# Patient Record
Sex: FEMALE | Race: BLACK OR AFRICAN AMERICAN | NOT HISPANIC OR LATINO
[De-identification: names, ages, dates, MRNs, and addresses within clinical notes are randomized per-mention and may not be internally consistent; named-entity substitution may affect disease eponyms.]

---

## 2013-12-20 RX ORDER — LEVETIRACETAM 250 MG/1
1 TABLET, FILM COATED ORAL
Qty: 0 | Refills: 0 | DISCHARGE
Start: 2013-12-20

## 2013-12-20 RX ORDER — METOPROLOL TARTRATE 50 MG
0.5 TABLET ORAL
Qty: 0 | Refills: 0 | DISCHARGE
Start: 2013-12-20

## 2017-01-26 ENCOUNTER — APPOINTMENT (OUTPATIENT)
Dept: INTERNAL MEDICINE | Facility: CLINIC | Age: 77
End: 2017-01-26

## 2017-01-26 VITALS — HEART RATE: 74 BPM | SYSTOLIC BLOOD PRESSURE: 114 MMHG | RESPIRATION RATE: 12 BRPM | DIASTOLIC BLOOD PRESSURE: 56 MMHG

## 2017-02-18 ENCOUNTER — RX RENEWAL (OUTPATIENT)
Age: 77
End: 2017-02-18

## 2017-03-06 ENCOUNTER — RX RENEWAL (OUTPATIENT)
Age: 77
End: 2017-03-06

## 2017-04-24 ENCOUNTER — APPOINTMENT (OUTPATIENT)
Dept: INTERNAL MEDICINE | Facility: CLINIC | Age: 77
End: 2017-04-24

## 2017-04-24 ENCOUNTER — LABORATORY RESULT (OUTPATIENT)
Age: 77
End: 2017-04-24

## 2017-04-24 VITALS — BODY MASS INDEX: 32.12 KG/M2 | HEIGHT: 61 IN | WEIGHT: 170.1 LBS

## 2017-04-24 VITALS — SYSTOLIC BLOOD PRESSURE: 110 MMHG | DIASTOLIC BLOOD PRESSURE: 54 MMHG | HEART RATE: 76 BPM | RESPIRATION RATE: 12 BRPM

## 2017-04-24 DIAGNOSIS — K58.0 IRRITABLE BOWEL SYNDROME WITH DIARRHEA: ICD-10-CM

## 2017-04-25 ENCOUNTER — RX RENEWAL (OUTPATIENT)
Age: 77
End: 2017-04-25

## 2017-04-26 LAB
25(OH)D3 SERPL-MCNC: 32.7 NG/ML
ALBUMIN SERPL ELPH-MCNC: 4.3 G/DL
ALP BLD-CCNC: 58 U/L
ALT SERPL-CCNC: 19 U/L
ANION GAP SERPL CALC-SCNC: 17 MMOL/L
AST SERPL-CCNC: 18 U/L
BASOPHILS # BLD AUTO: 0.02 K/UL
BASOPHILS NFR BLD AUTO: 0.2 %
BILIRUB DIRECT SERPL-MCNC: 0.1 MG/DL
BILIRUB INDIRECT SERPL-MCNC: 0.2 MG/DL
BILIRUB SERPL-MCNC: 0.3 MG/DL
BUN SERPL-MCNC: 13 MG/DL
CALCIUM SERPL-MCNC: 10 MG/DL
CHLORIDE SERPL-SCNC: 100 MMOL/L
CHOLEST SERPL-MCNC: 123 MG/DL
CHOLEST/HDLC SERPL: 3.7 RATIO
CO2 SERPL-SCNC: 24 MMOL/L
CREAT SERPL-MCNC: 1.03 MG/DL
EOSINOPHIL # BLD AUTO: 0.08 K/UL
EOSINOPHIL NFR BLD AUTO: 1 %
GLUCOSE SERPL-MCNC: 109 MG/DL
HBA1C MFR BLD HPLC: 5.6 %
HCT VFR BLD CALC: 41.3 %
HDLC SERPL-MCNC: 33 MG/DL
HGB BLD-MCNC: 12.2 G/DL
IMM GRANULOCYTES NFR BLD AUTO: 0.2 %
LDLC SERPL CALC-MCNC: 53 MG/DL
LYMPHOCYTES # BLD AUTO: 1.98 K/UL
LYMPHOCYTES NFR BLD AUTO: 23.9 %
MAN DIFF?: NORMAL
MCHC RBC-ENTMCNC: 26.9 PG
MCHC RBC-ENTMCNC: 29.5 GM/DL
MCV RBC AUTO: 91 FL
MONOCYTES # BLD AUTO: 0.49 K/UL
MONOCYTES NFR BLD AUTO: 5.9 %
NEUTROPHILS # BLD AUTO: 5.69 K/UL
NEUTROPHILS NFR BLD AUTO: 68.8 %
PLATELET # BLD AUTO: 233 K/UL
POTASSIUM SERPL-SCNC: 4.7 MMOL/L
PROT SERPL-MCNC: 7.4 G/DL
RBC # BLD: 4.54 M/UL
RBC # FLD: 15.8 %
SODIUM SERPL-SCNC: 141 MMOL/L
T3RU NFR SERPL: 0.84 INDEX
T4 FREE SERPL-MCNC: 1 NG/DL
TRIGL SERPL-MCNC: 186 MG/DL
TSH SERPL-ACNC: 0.97 UIU/ML
WBC # FLD AUTO: 8.28 K/UL

## 2017-06-26 ENCOUNTER — RX RENEWAL (OUTPATIENT)
Age: 77
End: 2017-06-26

## 2017-07-05 ENCOUNTER — RX RENEWAL (OUTPATIENT)
Age: 77
End: 2017-07-05

## 2017-07-12 ENCOUNTER — RX RENEWAL (OUTPATIENT)
Age: 77
End: 2017-07-12

## 2017-07-25 ENCOUNTER — APPOINTMENT (OUTPATIENT)
Dept: INTERNAL MEDICINE | Facility: CLINIC | Age: 77
End: 2017-07-25

## 2017-07-25 VITALS — DIASTOLIC BLOOD PRESSURE: 56 MMHG | RESPIRATION RATE: 12 BRPM | HEART RATE: 78 BPM | SYSTOLIC BLOOD PRESSURE: 100 MMHG

## 2017-07-25 VITALS — WEIGHT: 175 LBS | BODY MASS INDEX: 33.04 KG/M2 | HEIGHT: 61 IN

## 2017-07-25 VITALS — SYSTOLIC BLOOD PRESSURE: 114 MMHG | DIASTOLIC BLOOD PRESSURE: 56 MMHG

## 2017-07-25 DIAGNOSIS — Z60.2 PROBLEMS RELATED TO LIVING ALONE: ICD-10-CM

## 2017-07-25 SDOH — SOCIAL STABILITY - SOCIAL INSECURITY: PROBLEMS RELATED TO LIVING ALONE: Z60.2

## 2017-07-27 PROBLEM — Z60.2 LIVING ALONE: Status: ACTIVE | Noted: 2017-07-27

## 2017-07-29 ENCOUNTER — RX RENEWAL (OUTPATIENT)
Age: 77
End: 2017-07-29

## 2017-08-21 ENCOUNTER — TRANSCRIPTION ENCOUNTER (OUTPATIENT)
Age: 77
End: 2017-08-21

## 2017-09-14 ENCOUNTER — APPOINTMENT (OUTPATIENT)
Dept: INTERNAL MEDICINE | Facility: CLINIC | Age: 77
End: 2017-09-14
Payer: MEDICARE

## 2017-09-14 VITALS — SYSTOLIC BLOOD PRESSURE: 104 MMHG | RESPIRATION RATE: 12 BRPM | HEART RATE: 75 BPM | DIASTOLIC BLOOD PRESSURE: 64 MMHG

## 2017-09-14 VITALS — BODY MASS INDEX: 32.1 KG/M2 | WEIGHT: 170 LBS | HEIGHT: 61 IN

## 2017-09-14 PROCEDURE — 90662 IIV NO PRSV INCREASED AG IM: CPT

## 2017-09-14 PROCEDURE — G0008: CPT

## 2017-09-14 PROCEDURE — 99215 OFFICE O/P EST HI 40 MIN: CPT | Mod: 25

## 2017-09-25 ENCOUNTER — RX RENEWAL (OUTPATIENT)
Age: 77
End: 2017-09-25

## 2017-09-28 ENCOUNTER — RX RENEWAL (OUTPATIENT)
Age: 77
End: 2017-09-28

## 2017-10-23 ENCOUNTER — APPOINTMENT (OUTPATIENT)
Dept: GERIATRICS | Facility: CLINIC | Age: 77
End: 2017-10-23
Payer: MEDICARE

## 2017-10-23 VITALS
BODY MASS INDEX: 33.04 KG/M2 | DIASTOLIC BLOOD PRESSURE: 72 MMHG | OXYGEN SATURATION: 96 % | WEIGHT: 175 LBS | SYSTOLIC BLOOD PRESSURE: 140 MMHG | RESPIRATION RATE: 12 BRPM | HEIGHT: 61 IN | TEMPERATURE: 98.7 F | HEART RATE: 89 BPM

## 2017-10-23 PROCEDURE — 99205 OFFICE O/P NEW HI 60 MIN: CPT

## 2017-10-23 RX ORDER — LEVETIRACETAM 250 MG/1
250 TABLET, FILM COATED ORAL
Qty: 30 | Refills: 0 | Status: DISCONTINUED | COMMUNITY
Start: 2017-06-07 | End: 2017-10-23

## 2017-10-23 RX ORDER — LEVETIRACETAM 750 MG/1
750 TABLET, FILM COATED ORAL
Qty: 60 | Refills: 0 | Status: DISCONTINUED | COMMUNITY
Start: 2017-01-25 | End: 2017-10-23

## 2017-10-24 ENCOUNTER — APPOINTMENT (OUTPATIENT)
Dept: INTERNAL MEDICINE | Facility: CLINIC | Age: 77
End: 2017-10-24

## 2017-10-27 LAB
25(OH)D3 SERPL-MCNC: 19.9 NG/ML
ANION GAP SERPL CALC-SCNC: 16 MMOL/L
APPEARANCE: CLEAR
BASOPHILS # BLD AUTO: 0.01 K/UL
BASOPHILS NFR BLD AUTO: 0.1 %
BILIRUBIN URINE: NEGATIVE
BLOOD URINE: NEGATIVE
BUN SERPL-MCNC: 16 MG/DL
CALCIUM SERPL-MCNC: 9.9 MG/DL
CHLORIDE SERPL-SCNC: 98 MMOL/L
CHOLEST SERPL-MCNC: 158 MG/DL
CHOLEST/HDLC SERPL: 3.4 RATIO
CO2 SERPL-SCNC: 28 MMOL/L
COLOR: YELLOW
CREAT SERPL-MCNC: 0.86 MG/DL
CREAT SPEC-SCNC: 114 MG/DL
EOSINOPHIL # BLD AUTO: 0.03 K/UL
EOSINOPHIL NFR BLD AUTO: 0.3 %
FOLATE SERPL-MCNC: 8.8 NG/ML
GLUCOSE QUALITATIVE U: NEGATIVE MG/DL
GLUCOSE SERPL-MCNC: 102 MG/DL
HBA1C MFR BLD HPLC: 5.7 %
HCT VFR BLD CALC: 39.5 %
HDLC SERPL-MCNC: 47 MG/DL
HGB BLD-MCNC: 12.3 G/DL
IMM GRANULOCYTES NFR BLD AUTO: 0.4 %
KETONES URINE: NEGATIVE
LDLC SERPL CALC-MCNC: 84 MG/DL
LEUKOCYTE ESTERASE URINE: NEGATIVE
LEVETIRACETAM SERPL-MCNC: 29.8 MCG/ML
LYMPHOCYTES # BLD AUTO: 2.67 K/UL
LYMPHOCYTES NFR BLD AUTO: 28 %
MAN DIFF?: NORMAL
MCHC RBC-ENTMCNC: 27.5 PG
MCHC RBC-ENTMCNC: 31.1 GM/DL
MCV RBC AUTO: 88.4 FL
MICROALBUMIN 24H UR DL<=1MG/L-MCNC: 0.8 MG/DL
MICROALBUMIN/CREAT 24H UR-RTO: 7 MG/G
MONOCYTES # BLD AUTO: 0.41 K/UL
MONOCYTES NFR BLD AUTO: 4.3 %
NEUTROPHILS # BLD AUTO: 6.37 K/UL
NEUTROPHILS NFR BLD AUTO: 66.9 %
NITRITE URINE: NEGATIVE
PH URINE: 6
PLATELET # BLD AUTO: 310 K/UL
POTASSIUM SERPL-SCNC: 4.5 MMOL/L
PROTEIN URINE: NEGATIVE MG/DL
RBC # BLD: 4.47 M/UL
RBC # FLD: 17 %
SODIUM SERPL-SCNC: 142 MMOL/L
SPECIFIC GRAVITY URINE: 1.02
TRIGL SERPL-MCNC: 135 MG/DL
TSH SERPL-ACNC: 0.9 UIU/ML
UROBILINOGEN URINE: NEGATIVE MG/DL
VIT B12 SERPL-MCNC: 323 PG/ML
WBC # FLD AUTO: 9.53 K/UL

## 2017-10-30 ENCOUNTER — INPATIENT (INPATIENT)
Facility: HOSPITAL | Age: 77
LOS: 2 days | Discharge: HOME | End: 2017-11-02
Attending: INTERNAL MEDICINE | Admitting: INTERNAL MEDICINE
Payer: MEDICARE

## 2017-10-30 VITALS
RESPIRATION RATE: 18 BRPM | DIASTOLIC BLOOD PRESSURE: 72 MMHG | OXYGEN SATURATION: 97 % | TEMPERATURE: 99 F | HEART RATE: 81 BPM | SYSTOLIC BLOOD PRESSURE: 122 MMHG

## 2017-10-30 DIAGNOSIS — R07.9 CHEST PAIN, UNSPECIFIED: ICD-10-CM

## 2017-10-30 LAB
ALBUMIN SERPL ELPH-MCNC: 4 G/DL — SIGNIFICANT CHANGE UP (ref 3.3–5)
ALP SERPL-CCNC: 61 U/L — SIGNIFICANT CHANGE UP (ref 40–120)
ALT FLD-CCNC: 22 U/L — SIGNIFICANT CHANGE UP (ref 4–33)
AST SERPL-CCNC: 18 U/L — SIGNIFICANT CHANGE UP (ref 4–32)
BASOPHILS # BLD AUTO: 0.02 K/UL — SIGNIFICANT CHANGE UP (ref 0–0.2)
BASOPHILS NFR BLD AUTO: 0.2 % — SIGNIFICANT CHANGE UP (ref 0–2)
BILIRUB SERPL-MCNC: 0.2 MG/DL — SIGNIFICANT CHANGE UP (ref 0.2–1.2)
BUN SERPL-MCNC: 13 MG/DL — SIGNIFICANT CHANGE UP (ref 7–23)
CALCIUM SERPL-MCNC: 9.1 MG/DL — SIGNIFICANT CHANGE UP (ref 8.4–10.5)
CHLORIDE SERPL-SCNC: 101 MMOL/L — SIGNIFICANT CHANGE UP (ref 98–107)
CK MB BLD-MCNC: 3 — HIGH (ref 0–2.5)
CK MB BLD-MCNC: 5.58 NG/ML — HIGH (ref 1–4.7)
CK SERPL-CCNC: 186 U/L — HIGH (ref 25–170)
CO2 SERPL-SCNC: 31 MMOL/L — SIGNIFICANT CHANGE UP (ref 22–31)
CREAT SERPL-MCNC: 0.89 MG/DL — SIGNIFICANT CHANGE UP (ref 0.5–1.3)
EOSINOPHIL # BLD AUTO: 0.02 K/UL — SIGNIFICANT CHANGE UP (ref 0–0.5)
EOSINOPHIL NFR BLD AUTO: 0.2 % — SIGNIFICANT CHANGE UP (ref 0–6)
GLUCOSE SERPL-MCNC: 86 MG/DL — SIGNIFICANT CHANGE UP (ref 70–99)
HCT VFR BLD CALC: 38.3 % — SIGNIFICANT CHANGE UP (ref 34.5–45)
HGB BLD-MCNC: 11.8 G/DL — SIGNIFICANT CHANGE UP (ref 11.5–15.5)
IMM GRANULOCYTES # BLD AUTO: 0.03 # — SIGNIFICANT CHANGE UP
IMM GRANULOCYTES NFR BLD AUTO: 0.3 % — SIGNIFICANT CHANGE UP (ref 0–1.5)
LYMPHOCYTES # BLD AUTO: 2.74 K/UL — SIGNIFICANT CHANGE UP (ref 1–3.3)
LYMPHOCYTES # BLD AUTO: 30.5 % — SIGNIFICANT CHANGE UP (ref 13–44)
MCHC RBC-ENTMCNC: 27.4 PG — SIGNIFICANT CHANGE UP (ref 27–34)
MCHC RBC-ENTMCNC: 30.8 % — LOW (ref 32–36)
MCV RBC AUTO: 88.9 FL — SIGNIFICANT CHANGE UP (ref 80–100)
MONOCYTES # BLD AUTO: 0.74 K/UL — SIGNIFICANT CHANGE UP (ref 0–0.9)
MONOCYTES NFR BLD AUTO: 8.2 % — SIGNIFICANT CHANGE UP (ref 2–14)
NEUTROPHILS # BLD AUTO: 5.42 K/UL — SIGNIFICANT CHANGE UP (ref 1.8–7.4)
NEUTROPHILS NFR BLD AUTO: 60.6 % — SIGNIFICANT CHANGE UP (ref 43–77)
NRBC # FLD: 0 — SIGNIFICANT CHANGE UP
PLATELET # BLD AUTO: 261 K/UL — SIGNIFICANT CHANGE UP (ref 150–400)
PMV BLD: 10.5 FL — SIGNIFICANT CHANGE UP (ref 7–13)
POTASSIUM SERPL-MCNC: 4.1 MMOL/L — SIGNIFICANT CHANGE UP (ref 3.5–5.3)
POTASSIUM SERPL-SCNC: 4.1 MMOL/L — SIGNIFICANT CHANGE UP (ref 3.5–5.3)
PROT SERPL-MCNC: 7.3 G/DL — SIGNIFICANT CHANGE UP (ref 6–8.3)
RBC # BLD: 4.31 M/UL — SIGNIFICANT CHANGE UP (ref 3.8–5.2)
RBC # FLD: 16.5 % — HIGH (ref 10.3–14.5)
SODIUM SERPL-SCNC: 142 MMOL/L — SIGNIFICANT CHANGE UP (ref 135–145)
TROPONIN T SERPL-MCNC: < 0.06 NG/ML — SIGNIFICANT CHANGE UP (ref 0–0.06)
WBC # BLD: 8.97 K/UL — SIGNIFICANT CHANGE UP (ref 3.8–10.5)
WBC # FLD AUTO: 8.97 K/UL — SIGNIFICANT CHANGE UP (ref 3.8–10.5)

## 2017-10-30 PROCEDURE — 93970 EXTREMITY STUDY: CPT | Mod: 26

## 2017-10-30 PROCEDURE — 71020: CPT | Mod: 26

## 2017-10-30 RX ORDER — DULOXETINE HYDROCHLORIDE 30 MG/1
60 CAPSULE, DELAYED RELEASE ORAL DAILY
Qty: 0 | Refills: 0 | Status: DISCONTINUED | OUTPATIENT
Start: 2017-10-30 | End: 2017-11-02

## 2017-10-30 RX ORDER — LEVETIRACETAM 250 MG/1
750 TABLET, FILM COATED ORAL
Qty: 0 | Refills: 0 | Status: DISCONTINUED | OUTPATIENT
Start: 2017-10-30 | End: 2017-11-02

## 2017-10-30 RX ORDER — METOPROLOL TARTRATE 50 MG
12.5 TABLET ORAL
Qty: 0 | Refills: 0 | Status: DISCONTINUED | OUTPATIENT
Start: 2017-10-30 | End: 2017-11-02

## 2017-10-30 RX ORDER — AMLODIPINE BESYLATE 2.5 MG/1
5 TABLET ORAL DAILY
Qty: 0 | Refills: 0 | Status: DISCONTINUED | OUTPATIENT
Start: 2017-10-30 | End: 2017-11-02

## 2017-10-30 RX ORDER — GABAPENTIN 400 MG/1
200 CAPSULE ORAL
Qty: 0 | Refills: 0 | Status: DISCONTINUED | OUTPATIENT
Start: 2017-10-30 | End: 2017-11-02

## 2017-10-30 RX ORDER — LEVETIRACETAM 250 MG/1
250 TABLET, FILM COATED ORAL AT BEDTIME
Qty: 0 | Refills: 0 | Status: DISCONTINUED | OUTPATIENT
Start: 2017-10-30 | End: 2017-11-02

## 2017-10-30 RX ORDER — LEVOTHYROXINE SODIUM 125 MCG
50 TABLET ORAL DAILY
Qty: 0 | Refills: 0 | Status: DISCONTINUED | OUTPATIENT
Start: 2017-10-30 | End: 2017-11-02

## 2017-10-30 RX ORDER — SIMVASTATIN 20 MG/1
20 TABLET, FILM COATED ORAL AT BEDTIME
Qty: 0 | Refills: 0 | Status: DISCONTINUED | OUTPATIENT
Start: 2017-10-30 | End: 2017-11-02

## 2017-10-30 RX ORDER — SODIUM CHLORIDE 9 MG/ML
3 INJECTION INTRAMUSCULAR; INTRAVENOUS; SUBCUTANEOUS EVERY 8 HOURS
Qty: 0 | Refills: 0 | Status: DISCONTINUED | OUTPATIENT
Start: 2017-10-30 | End: 2017-11-02

## 2017-10-30 NOTE — ED ADULT NURSE REASSESSMENT NOTE - NS ED NURSE REASSESS COMMENT FT1
pt appears in NAD @ this time, denies pain, Tele PA @ bedside for eval, as per PA CE#2 @ 12m, VS as noted, awaiting bed assignment and further orders, will continue to monitor.  (break)

## 2017-10-30 NOTE — ED PROVIDER NOTE - PMH
Arthritis  has pain in hips and back  HLD (Hyperlipidemia)    Hypertension    Non-Insulin Dependent Diabetes Mellitus  diagnosed in 1992  Sciatica  both hips

## 2017-10-30 NOTE — H&P ADULT - PROBLEM SELECTOR PLAN 1
Admit to tele to r/o ACS  check cbc, bmp, a1c, flp, tsh, trend CE  echo ordered   Ischemic eval to be determined by Dr. Connolly in AM  discussed with Dr. Connolly

## 2017-10-30 NOTE — ED ADULT NURSE REASSESSMENT NOTE - NS ED NURSE REASSESS COMMENT FT1
rec'd pt. a&ox3, in NAD, breathes with ease, with g20 saline lock on rt wrist with no ss of infiltration. pt. denies any pain at this time. denies any n/v nor SOB. awaits US and XR results. Attending at bedside to eval pt. will continue to monitor

## 2017-10-30 NOTE — ED PROVIDER NOTE - MEDICAL DECISION MAKING DETAILS
77yoF hx of htn, hld, dm pw chest pain. will admit for repeat labs, ekg, possible stress. angina is worsening.

## 2017-10-30 NOTE — ED PROVIDER NOTE - OBJECTIVE STATEMENT
77yoF pw htn, hld, dm pw chest pain this morning, while sitting, left sided, radiating to left arm. pain is associated with sweating at rest. also with some new bilateral lower extremity edema, right worse than the left, both worsening. patient had similar episode with chest pain, diaphoresis, and bilateraly arm pain a few weeks ago. never saw a cardiologist. denies fevers, chills, nausea, vomiting, abd pain or other issues.

## 2017-10-30 NOTE — ED ADULT NURSE NOTE - OBJECTIVE STATEMENT
Pt recd A+Ox3. C/o CP that started at 8am and lasted unknown amount of time. Pain was non radiating on left side of chest, denies N/V or SOB. C/o abd pain that began shortly after. Pt then had BM and all pain was resolved. Pt had similar pain in August and was told to F/U with cardiologist but never did. Denies any CP at this time. Cardiac monitor in place. Will continue to monitor.

## 2017-10-30 NOTE — ED PROVIDER NOTE - ATTENDING CONTRIBUTION TO CARE
Ondina: 76 yo female with DM and HTN c/o multiple episodes of chest pain with associated diaphoresis x several month. Pt ahd a more concernign chest pain episode today so she came to the ED. No associated SOB, fevers or chills. No abdominal pain, nausea or vomiting. NO new/worsening LE edema or pain. NO recent travel. Exam: cardiac and lung exam unremarkable, +chronic left eye swelling/tumor, abdomen is soft an dnontender, NO LE hal aor calf TTP Plan: cbc, cmp, cardiac enzymes, cxr, eklg admit

## 2017-10-30 NOTE — H&P ADULT - ATTENDING COMMENTS
pt seen and examined agree with plan above     EKG - NSR     A/P     1) Chest pain - atypical, will get 2d echo to access LV, CE and EKG negative, will consider stress test     2) H/O pituitary tumor - neuro consult     3) DVT - sc lovenox

## 2017-10-30 NOTE — H&P ADULT - ASSESSMENT
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

## 2017-10-30 NOTE — H&P ADULT - PMH
Arthritis  has pain in hips and back  Brain tumor  sellar mass.  hx of L eye blidness/proptosis from a ?tumor  HLD (Hyperlipidemia)    Hypertension    Non-Insulin Dependent Diabetes Mellitus  diagnosed in 1992  Sciatica  both hips

## 2017-10-30 NOTE — H&P ADULT - PSH
H/O: Hysterectomy  abdominal with right oophorectomy in 1982  Pituitary Tumor  excision in 1992  S/P Cholecystectomy  open in 1979  S/P Tubal Ligation  in 1978  Traumatic subdural hematoma  s/p evacuation in 2013

## 2017-10-30 NOTE — H&P ADULT - HISTORY OF PRESENT ILLNESS
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 Patient states she has been L sided chest pain which radiates to b/l arm and is associated with diaphoresis since August. She was seen at urgent care in August and was advised to follow up with PMD. Patient went to PMD, but had no further work up and patient went to vacation to [place] for two weeks. Patient was then getting symptoms intermittent for two months. This AM she woke up with severe L sided chest pain which was associated with diaphoresis. Her PMDs office normally checks on her monthly through phone, and when she told them about the chest pain. She was instructed to come to ED further eval. 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 Patient states she has been L sided chest pain which radiates to b/l arm and is associated with diaphoresis since August. She was seen at urgent care in August and was advised to follow up with PMD. Patient went to PMD, but had no further work up and patient went to vacation to [place] for two weeks. Patient was then getting symptoms intermittent for two months. This AM she woke up with severe L sided chest pain which was associated with diaphoresis. Her PMDs office normally checks on her monthly through phone, and when she told them about the chest pain. She was instructed to come to ED further eval.  Denies fever, chills, cough, falls, LOC, SOB, GOMEZ, abdominal pain, nausea, vomiting, calf tenderness, dysuria, constipation, melena, or hematochezia    On admission: chest pain free.

## 2017-10-31 DIAGNOSIS — I10 ESSENTIAL (PRIMARY) HYPERTENSION: ICD-10-CM

## 2017-10-31 DIAGNOSIS — Z29.9 ENCOUNTER FOR PROPHYLACTIC MEASURES, UNSPECIFIED: ICD-10-CM

## 2017-10-31 DIAGNOSIS — E11.9 TYPE 2 DIABETES MELLITUS WITHOUT COMPLICATIONS: ICD-10-CM

## 2017-10-31 DIAGNOSIS — R07.9 CHEST PAIN, UNSPECIFIED: ICD-10-CM

## 2017-10-31 DIAGNOSIS — D49.6 NEOPLASM OF UNSPECIFIED BEHAVIOR OF BRAIN: ICD-10-CM

## 2017-10-31 DIAGNOSIS — S06.5X9A TRAUMATIC SUBDURAL HEMORRHAGE WITH LOSS OF CONSCIOUSNESS OF UNSPECIFIED DURATION, INITIAL ENCOUNTER: Chronic | ICD-10-CM

## 2017-10-31 DIAGNOSIS — E78.5 HYPERLIPIDEMIA, UNSPECIFIED: ICD-10-CM

## 2017-10-31 DIAGNOSIS — R56.9 UNSPECIFIED CONVULSIONS: ICD-10-CM

## 2017-10-31 LAB
BASOPHILS # BLD AUTO: 0.03 K/UL — SIGNIFICANT CHANGE UP (ref 0–0.2)
BASOPHILS NFR BLD AUTO: 0.3 % — SIGNIFICANT CHANGE UP (ref 0–2)
BUN SERPL-MCNC: 14 MG/DL — SIGNIFICANT CHANGE UP (ref 7–23)
CALCIUM SERPL-MCNC: 8.8 MG/DL — SIGNIFICANT CHANGE UP (ref 8.4–10.5)
CHLORIDE SERPL-SCNC: 101 MMOL/L — SIGNIFICANT CHANGE UP (ref 98–107)
CHOLEST SERPL-MCNC: 141 MG/DL — SIGNIFICANT CHANGE UP (ref 120–199)
CK MB BLD-MCNC: 3 — HIGH (ref 0–2.5)
CK MB BLD-MCNC: 3.1 — HIGH (ref 0–2.5)
CK MB BLD-MCNC: 4.78 NG/ML — HIGH (ref 1–4.7)
CK MB BLD-MCNC: 4.81 NG/ML — HIGH (ref 1–4.7)
CK SERPL-CCNC: 154 U/L — SIGNIFICANT CHANGE UP (ref 25–170)
CK SERPL-CCNC: 162 U/L — SIGNIFICANT CHANGE UP (ref 25–170)
CO2 SERPL-SCNC: 30 MMOL/L — SIGNIFICANT CHANGE UP (ref 22–31)
CREAT SERPL-MCNC: 0.93 MG/DL — SIGNIFICANT CHANGE UP (ref 0.5–1.3)
EOSINOPHIL # BLD AUTO: 0.04 K/UL — SIGNIFICANT CHANGE UP (ref 0–0.5)
EOSINOPHIL NFR BLD AUTO: 0.5 % — SIGNIFICANT CHANGE UP (ref 0–6)
GLUCOSE SERPL-MCNC: 103 MG/DL — HIGH (ref 70–99)
HBA1C BLD-MCNC: 5.5 % — SIGNIFICANT CHANGE UP (ref 4–5.6)
HCT VFR BLD CALC: 38.1 % — SIGNIFICANT CHANGE UP (ref 34.5–45)
HDLC SERPL-MCNC: 42 MG/DL — LOW (ref 45–65)
HGB BLD-MCNC: 11.6 G/DL — SIGNIFICANT CHANGE UP (ref 11.5–15.5)
IMM GRANULOCYTES # BLD AUTO: 0.03 # — SIGNIFICANT CHANGE UP
IMM GRANULOCYTES NFR BLD AUTO: 0.3 % — SIGNIFICANT CHANGE UP (ref 0–1.5)
LIPID PNL WITH DIRECT LDL SERPL: 82 MG/DL — SIGNIFICANT CHANGE UP
LYMPHOCYTES # BLD AUTO: 2.64 K/UL — SIGNIFICANT CHANGE UP (ref 1–3.3)
LYMPHOCYTES # BLD AUTO: 29.9 % — SIGNIFICANT CHANGE UP (ref 13–44)
MAGNESIUM SERPL-MCNC: 1.9 MG/DL — SIGNIFICANT CHANGE UP (ref 1.6–2.6)
MCHC RBC-ENTMCNC: 26.8 PG — LOW (ref 27–34)
MCHC RBC-ENTMCNC: 30.4 % — LOW (ref 32–36)
MCV RBC AUTO: 88 FL — SIGNIFICANT CHANGE UP (ref 80–100)
MONOCYTES # BLD AUTO: 0.6 K/UL — SIGNIFICANT CHANGE UP (ref 0–0.9)
MONOCYTES NFR BLD AUTO: 6.8 % — SIGNIFICANT CHANGE UP (ref 2–14)
NEUTROPHILS # BLD AUTO: 5.49 K/UL — SIGNIFICANT CHANGE UP (ref 1.8–7.4)
NEUTROPHILS NFR BLD AUTO: 62.2 % — SIGNIFICANT CHANGE UP (ref 43–77)
NRBC # FLD: 0 — SIGNIFICANT CHANGE UP
PHOSPHATE SERPL-MCNC: 4.3 MG/DL — SIGNIFICANT CHANGE UP (ref 2.5–4.5)
PLATELET # BLD AUTO: 256 K/UL — SIGNIFICANT CHANGE UP (ref 150–400)
PMV BLD: 10.9 FL — SIGNIFICANT CHANGE UP (ref 7–13)
POTASSIUM SERPL-MCNC: 4 MMOL/L — SIGNIFICANT CHANGE UP (ref 3.5–5.3)
POTASSIUM SERPL-SCNC: 4 MMOL/L — SIGNIFICANT CHANGE UP (ref 3.5–5.3)
RBC # BLD: 4.33 M/UL — SIGNIFICANT CHANGE UP (ref 3.8–5.2)
RBC # FLD: 16.4 % — HIGH (ref 10.3–14.5)
SODIUM SERPL-SCNC: 143 MMOL/L — SIGNIFICANT CHANGE UP (ref 135–145)
TRIGL SERPL-MCNC: 139 MG/DL — SIGNIFICANT CHANGE UP (ref 10–149)
TROPONIN T SERPL-MCNC: < 0.06 NG/ML — SIGNIFICANT CHANGE UP (ref 0–0.06)
TROPONIN T SERPL-MCNC: < 0.06 NG/ML — SIGNIFICANT CHANGE UP (ref 0–0.06)
TSH SERPL-MCNC: 1.46 UIU/ML — SIGNIFICANT CHANGE UP (ref 0.27–4.2)
WBC # BLD: 8.83 K/UL — SIGNIFICANT CHANGE UP (ref 3.8–10.5)
WBC # FLD AUTO: 8.83 K/UL — SIGNIFICANT CHANGE UP (ref 3.8–10.5)

## 2017-10-31 RX ORDER — SODIUM CHLORIDE 9 MG/ML
1000 INJECTION, SOLUTION INTRAVENOUS
Qty: 0 | Refills: 0 | Status: DISCONTINUED | OUTPATIENT
Start: 2017-10-31 | End: 2017-11-02

## 2017-10-31 RX ORDER — DEXTROSE 50 % IN WATER 50 %
12.5 SYRINGE (ML) INTRAVENOUS ONCE
Qty: 0 | Refills: 0 | Status: DISCONTINUED | OUTPATIENT
Start: 2017-10-31 | End: 2017-11-02

## 2017-10-31 RX ORDER — INSULIN LISPRO 100/ML
VIAL (ML) SUBCUTANEOUS AT BEDTIME
Qty: 0 | Refills: 0 | Status: DISCONTINUED | OUTPATIENT
Start: 2017-10-31 | End: 2017-11-02

## 2017-10-31 RX ORDER — DEXTROSE 50 % IN WATER 50 %
1 SYRINGE (ML) INTRAVENOUS ONCE
Qty: 0 | Refills: 0 | Status: DISCONTINUED | OUTPATIENT
Start: 2017-10-31 | End: 2017-11-02

## 2017-10-31 RX ORDER — ENOXAPARIN SODIUM 100 MG/ML
40 INJECTION SUBCUTANEOUS DAILY
Qty: 0 | Refills: 0 | Status: DISCONTINUED | OUTPATIENT
Start: 2017-10-31 | End: 2017-11-02

## 2017-10-31 RX ORDER — GLUCAGON INJECTION, SOLUTION 0.5 MG/.1ML
1 INJECTION, SOLUTION SUBCUTANEOUS ONCE
Qty: 0 | Refills: 0 | Status: DISCONTINUED | OUTPATIENT
Start: 2017-10-31 | End: 2017-11-02

## 2017-10-31 RX ORDER — DEXTROSE 50 % IN WATER 50 %
25 SYRINGE (ML) INTRAVENOUS ONCE
Qty: 0 | Refills: 0 | Status: DISCONTINUED | OUTPATIENT
Start: 2017-10-31 | End: 2017-11-02

## 2017-10-31 RX ORDER — INSULIN LISPRO 100/ML
VIAL (ML) SUBCUTANEOUS
Qty: 0 | Refills: 0 | Status: DISCONTINUED | OUTPATIENT
Start: 2017-10-31 | End: 2017-11-02

## 2017-10-31 RX ADMIN — GABAPENTIN 200 MILLIGRAM(S): 400 CAPSULE ORAL at 18:54

## 2017-10-31 RX ADMIN — SODIUM CHLORIDE 3 MILLILITER(S): 9 INJECTION INTRAMUSCULAR; INTRAVENOUS; SUBCUTANEOUS at 13:12

## 2017-10-31 RX ADMIN — Medication 12.5 MILLIGRAM(S): at 18:54

## 2017-10-31 RX ADMIN — SODIUM CHLORIDE 3 MILLILITER(S): 9 INJECTION INTRAMUSCULAR; INTRAVENOUS; SUBCUTANEOUS at 21:55

## 2017-10-31 RX ADMIN — SIMVASTATIN 20 MILLIGRAM(S): 20 TABLET, FILM COATED ORAL at 22:48

## 2017-10-31 RX ADMIN — LEVETIRACETAM 750 MILLIGRAM(S): 250 TABLET, FILM COATED ORAL at 05:59

## 2017-10-31 RX ADMIN — AMLODIPINE BESYLATE 5 MILLIGRAM(S): 2.5 TABLET ORAL at 05:59

## 2017-10-31 RX ADMIN — SODIUM CHLORIDE 3 MILLILITER(S): 9 INJECTION INTRAMUSCULAR; INTRAVENOUS; SUBCUTANEOUS at 06:01

## 2017-10-31 RX ADMIN — GABAPENTIN 200 MILLIGRAM(S): 400 CAPSULE ORAL at 06:00

## 2017-10-31 RX ADMIN — LEVETIRACETAM 250 MILLIGRAM(S): 250 TABLET, FILM COATED ORAL at 22:48

## 2017-10-31 RX ADMIN — ENOXAPARIN SODIUM 40 MILLIGRAM(S): 100 INJECTION SUBCUTANEOUS at 18:55

## 2017-10-31 RX ADMIN — LEVETIRACETAM 750 MILLIGRAM(S): 250 TABLET, FILM COATED ORAL at 19:53

## 2017-10-31 RX ADMIN — Medication 12.5 MILLIGRAM(S): at 05:59

## 2017-10-31 RX ADMIN — DULOXETINE HYDROCHLORIDE 60 MILLIGRAM(S): 30 CAPSULE, DELAYED RELEASE ORAL at 12:52

## 2017-10-31 RX ADMIN — Medication 50 MICROGRAM(S): at 06:00

## 2017-10-31 NOTE — CONSULT NOTE ADULT - SUBJECTIVE AND OBJECTIVE BOX
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 Patient states she has been L sided chest pain which radiates to b/l arm and is associated with diaphoresis since August. She was seen at urgent care in August and was advised to follow up with PMD. Patient went to PMD, but had no further work up and patient went to vacation to [place] for two weeks. Patient was then getting symptoms intermittent for two months. This AM she woke up with severe L sided chest pain which was associated with diaphoresis. Her PMDs office normally checks on her monthly through phone, and when she told them about the chest pain. She was instructed to come to ED further eval.  Denies fever, chills, cough, falls, LOC, SOB, GOMEZ, abdominal pain, nausea, vomiting, calf tenderness, dysuria, constipation, melena, or hematochezia    On admission: chest pain free. (30 Oct 2017 23:08)   Neuro consulted secondary to patient's history of pituitary mass, s/p resection in 1992, left eye blindness.  Hx of ADH s/p evacuation in 2013.  Currently patient is stable at her baseline neurologically.  She is on keppra for seizures, she is followed by neuro-oncology at [place].       Review of Systems:  All review of systems negative, except for those marked:  recent CP, currently CP free.  No SOB, no HA.  chronic left eye blindness.  some ataxia which has been chronic since previous pituitary surgery.    PAST MEDICAL & SURGICAL HISTORY:  Brain tumor: sellar mass.  hx of L eye blidness/proptosis from a ?tumor  Sciatica: both hips  Arthritis: has pain in hips and back  HLD (Hyperlipidemia)  Non-Insulin Dependent Diabetes Mellitus: diagnosed in 1992  Hypertension  Traumatic subdural hematoma: s/p evacuation in 2013  Pituitary Tumor: excision in 1992  S/P Cholecystectomy: open in 1979  H/O: Hysterectomy: abdominal with right oophorectomy in 1982  S/P Tubal Ligation: in 1978    Past Hospitalizations:  MEDICATIONS  (STANDING):  amLODIPine   Tablet 5 milliGRAM(s) Oral daily  dextrose 5%. 1000 milliLiter(s) (50 mL/Hr) IV Continuous <Continuous>  dextrose 50% Injectable 12.5 Gram(s) IV Push once  dextrose 50% Injectable 25 Gram(s) IV Push once  dextrose 50% Injectable 25 Gram(s) IV Push once  DULoxetine 60 milliGRAM(s) Oral daily  enoxaparin Injectable 40 milliGRAM(s) SubCutaneous daily  gabapentin 200 milliGRAM(s) Oral two times a day  insulin lispro (HumaLOG) corrective regimen sliding scale   SubCutaneous three times a day before meals  insulin lispro (HumaLOG) corrective regimen sliding scale   SubCutaneous at bedtime  levETIRAcetam 250 milliGRAM(s) Oral at bedtime  levETIRAcetam 750 milliGRAM(s) Oral two times a day  levothyroxine 50 MICROGram(s) Oral daily  metoprolol     tartrate 12.5 milliGRAM(s) Oral two times a day  simvastatin 20 milliGRAM(s) Oral at bedtime  sodium chloride 0.9% lock flush 3 milliLiter(s) IV Push every 8 hours    MEDICATIONS  (PRN):  dextrose Gel 1 Dose(s) Oral once PRN Blood Glucose LESS THAN 70 milliGRAM(s)/deciliter  glucagon  Injectable 1 milliGRAM(s) IntraMuscular once PRN Glucose LESS THAN 70 milligrams/deciliter    Allergies    aspirin (Nausea; Other)  D.A. Chewable (Other)  erythromycin (Rash)  Naprosyn (Other)  Talwin Compound (Urticaria; Rash)  Talwin Lactate (Other)    Intolerances          FAMILY HISTORY:  No pertinent family history in first degree relatives      Social History  Lives with: family     Vital Signs Last 24 Hrs  T(C): 36.7 (31 Oct 2017 16:24), Max: 36.9 (30 Oct 2017 21:08)  T(F): 98 (31 Oct 2017 16:24), Max: 98.5 (30 Oct 2017 21:08)  HR: 85 (31 Oct 2017 16:24) (70 - 90)  BP: 136/66 (31 Oct 2017 16:24) (120/56 - 138/74)  BP(mean): --  RR: 18 (31 Oct 2017 16:24) (17 - 18)  SpO2: 96% (31 Oct 2017 16:24) (96% - 100%)  Daily     Daily        NEUROLOGIC EXAM  Mental Status:     Oriented to time/place/person; Good eye contact ; follow simple commands ;    speech fluent  CN:  left eye proptosis.  Chronic blindness.  Pupil of the right eye is reactive to light. Right eye extra-ocular movement intact.  face symmetric, tongue midline.      Muscle Strength:	 Full strength 5/5, proximal and distal,  upper and lower extremities except left TA 4/5  Muscle Tone:	Normal tone  Deep Tendon Reflexes:         1+/4  : Biceps, Brachioradialis, Triceps Bilateral;  2+/4 : Pattelar, Ankle bilateral. No clonus.  Plantar Response:	Plantar reflexes flexion bilaterally  Sensation:		Intact to pain, light touch, temperature and vibration throughout.  Coordination/	No dysmetria in finger to nose test bilaterally  Cerebellum	  Tandem Gait/Romberg	wide based, antalgic.  Chronic as per patient.    Lab Results:                        11.6   8.83  )-----------( 256      ( 31 Oct 2017 06:30 )             38.1     10-31    143  |  101  |  14  ----------------------------<  103<H>  4.0   |  30  |  0.93    Ca    8.8      31 Oct 2017 06:30  Phos  4.3     10-31  Mg     1.9     10-31    TPro  7.3  /  Alb  4.0  /  TBili  0.2  /  DBili  x   /  AST  18  /  ALT  22  /  AlkPhos  61  10-30    LIVER FUNCTIONS - ( 30 Oct 2017 18:40 )  Alb: 4.0 g/dL / Pro: 7.3 g/dL / ALK PHOS: 61 u/L / ALT: 22 u/L / AST: 18 u/L / GGT: x

## 2017-10-31 NOTE — CONSULT NOTE ADULT - PROBLEM SELECTOR RECOMMENDATION 9
Known brain tumor with left eye blindness.  Patient is followed by neuro-oncologist at [place].  Recommend f/u with neuro-oncology as outpatient.

## 2017-10-31 NOTE — CONSULT NOTE ADULT - SUBJECTIVE AND OBJECTIVE BOX
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 Patient states she has been L sided chest pain which radiates to b/l arm and is associated with diaphoresis since August. She was seen at urgent care in August and was advised to follow up with PMD. Patient went to PMD, but had no further work up and patient went to vacation to [place] for two weeks. Patient was then getting symptoms intermittent for two months. This AM she woke up with severe L sided chest pain which was associated with diaphoresis. Her PMDs office normally checks on her monthly through phone, and when she told them about the chest pain. She was instructed to come to ED further eval.  Denies fever, chills, cough, falls, LOC, SOB, GOMEZ, abdominal pain, nausea, vomiting, calf tenderness, dysuria, constipation, melena, or hematochezia    On admission: chest pain free.     Review of Systems:  · Negative General Symptoms	no fever; no chills; no sweating	  · Negative Skin Symptoms	no rash; no itching; no dryness	  · Negative Ophthalmologic Symptoms	no diplopia; no photophobia; no lacrimation L; no lacrimation R	  · Negative ENMT Symptoms	no hearing difficulty; no ear pain; no tinnitus	  · Negative Respiratory and Thorax Symptoms	no wheezing; no dyspnea; no cough	  · Negative Cardiovascular Symptoms	no palpitations; no dyspnea on exertion; no orthopnea	  · Cardiovascular Symptoms	chest pain	  · Negative Gastrointestinal Symptoms	no nausea; no vomiting	  · Negative Musculoskeletal Symptoms	no arthritis	  · Negative Neurological Symptoms	no transient paralysis	  · Negative Psychiatric Symptoms	no suicidal ideation	  · Negative Hematology Symptoms	no gum bleeding	      Allergies and Intolerances:        Allergies:  	Talwin Compound: Drug, Urticaria, Rash  	erythromycin: Drug, Rash  	aspirin: Drug, Nausea, Other  	Talwin Lactate: Drug, Other, hallucinates  	D.A. Chewable: Drug, Other, diarrhea  	Naprosyn: Drug, Other, abdominal pain    Home Medications:   * Patient Currently Takes Medications as of 30-Oct-2017 23:01 documented in Structured Notes  · 	Artificial Tears ophthalmic solution: 1 drop(s) to each affected eye 3 times a day x 30 days, Last Dose Taken:    · 	simvastatin 20 mg oral tablet: 1 tab(s) orally once a day (at bedtime), Last Dose Taken:    · 	amLODIPine 5 mg oral tablet: 1 tab(s) orally once a day, Last Dose Taken:    · 	Keppra 750 mg oral tablet: 1 tab(s) orally 2 times a day, Last Dose Taken:    · 	metoprolol tartrate 25 mg oral tablet: 0.5 tab(s) orally 2 times a day, Last Dose Taken:    · 	metformin 500 mg oral tablet: 1 cap(s) orally 3 times a day, Last Dose Taken:    · 	gabapentin 100 mg oral capsule: 2 cap(s) orally 2 times a day, Last Dose Taken:    · 	Keppra 250 mg oral tablet: 1 tab(s) orally once a day (at bedtime), Last Dose Taken:    · 	DULoxetine 60 mg oral delayed release capsule: 1 cap(s) orally once a day, Last Dose Taken:    · 	levothyroxine 50 mcg (0.05 mg) oral capsule: 1 cap(s) orally once a day, Last Dose Taken:    · 	Victoza 18 mg/3 mL subcutaneous solution: Last Dose Taken:      Patient History:   Past Medical History:  Arthritis  has pain in hips and back  Brain tumor  sellar mass.  hx of L eye blidness/proptosis from a ?tumor  HLD (Hyperlipidemia)    Hypertension    Non-Insulin Dependent Diabetes Mellitus  diagnosed in 1992  Sciatica  both hips.    Past Surgical History:  H/O: Hysterectomy  abdominal with right oophorectomy in 1982  Pituitary Tumor  excision in 1992  S/P Cholecystectomy  open in 1979  S/P Tubal Ligation  in 1978  Traumatic subdural hematoma  s/p evacuation in 2013.    Family History:  No pertinent family history in first degree relatives.    Social History:  Social History (marital status, living situation, occupation, tobacco use, alcohol and drug use, and sexual history): No smoking or alcohol use	    Tobacco Screening:  · Core Measure Site	No	    Risk Assessment:   Heart Failure:  Does this patient have a history of or has been diagnosed with heart failure? no.      Physical Exam:  · Constitutional	detailed exam	  · Constitutional Details	well-developed; well-groomed; well-nourished; no distress	  · Eyes	detailed exam	  · Eyes Comments	L eye blind and L eye proptosis noted.	  · ENMT	detailed exam	  · ENMT Details	mouth	  · Mouth	normal mouth and gums; moist	  · Neck	detailed exam	  · Neck Details	supple	  · Back	detailed exam	  · Back Details	normal shape; ROM intact; strength intact	  · Respiratory	detailed exam	  · Respiratory Details	airway patent; breath sounds equal; good air movement	  · Cardiovascular	detailed exam	  · Cardiovascular Details	regular rate and rhythm  no rub	  · Gastrointestinal	detailed exam	  · GI Normal	soft; nontender	  · Extremities	detailed exam	  · Extremities Details	no clubbing; no cyanosis; no pedal edema	  · Vascular	detailed exam	  · Carotid Pulse	right normal; left normal	  · Radial Pulse	right normal; left normal	  · DP Pulse	right normal; left normal	  · PT Pulse	right normal; left normal	  · Neurological	detailed exam	  · Neurological Details	alert and oriented x 3; responds to verbal commands; normal strength	  · Skin	detailed exam	  · Skin Details	color normal	  · Lymph Nodes	detailed exam	  · Musculoskeletal	detailed exam	  · Musculoskeletal Details	ROM intact	      Labs and Results:  Labs, Radiology, Cardiology, and Other Results: EKG: NSR 82 LAD, Flat T in I, AVL, V5, V6	    Assessment and Plan:   Assessment:  · Assessment		  76 y/o F with hx of HTN, HLD, DM, pituitary mass, hx of Subdural hematoma which evacuated in 2013 s/p fall, 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  Plan: Admit to tele to r/o ACS    echo     Problem/Plan - 2:  ·  Problem: HLD (Hyperlipidemia).  Plan: cont statin.     Problem/Plan - 3:  ·  Problem: Diabetes mellitus, type 2.  Plan: hold oral hypoglycemics  ISS  monitor fingersticks.     Problem/Plan - 4:  ·  Problem: Brain tumor.  Plan: cont keppra.     Problem/Plan - 5:  ·  Problem: Hypertension.  Plan: cont metoprolol.     Problem/Plan - 6:  Problem: Need for prophylactic measure. Plan: LE stockings given hx of bleeds.

## 2017-11-01 DIAGNOSIS — Z71.89 OTHER SPECIFIED COUNSELING: ICD-10-CM

## 2017-11-01 DIAGNOSIS — F03.91 UNSPECIFIED DEMENTIA WITH BEHAVIORAL DISTURBANCE: ICD-10-CM

## 2017-11-01 DIAGNOSIS — R19.7 DIARRHEA, UNSPECIFIED: ICD-10-CM

## 2017-11-01 DIAGNOSIS — R26.81 UNSTEADINESS ON FEET: ICD-10-CM

## 2017-11-01 DIAGNOSIS — F41.8 OTHER SPECIFIED ANXIETY DISORDERS: ICD-10-CM

## 2017-11-01 DIAGNOSIS — R07.89 OTHER CHEST PAIN: ICD-10-CM

## 2017-11-01 DIAGNOSIS — K21.9 GASTRO-ESOPHAGEAL REFLUX DISEASE WITHOUT ESOPHAGITIS: ICD-10-CM

## 2017-11-01 DIAGNOSIS — E11.69 TYPE 2 DIABETES MELLITUS WITH OTHER SPECIFIED COMPLICATION: ICD-10-CM

## 2017-11-01 LAB
BASOPHILS # BLD AUTO: 0.02 K/UL — SIGNIFICANT CHANGE UP (ref 0–0.2)
BASOPHILS NFR BLD AUTO: 0.2 % — SIGNIFICANT CHANGE UP (ref 0–2)
BUN SERPL-MCNC: 16 MG/DL — SIGNIFICANT CHANGE UP (ref 7–23)
CALCIUM SERPL-MCNC: 8.6 MG/DL — SIGNIFICANT CHANGE UP (ref 8.4–10.5)
CHLORIDE SERPL-SCNC: 103 MMOL/L — SIGNIFICANT CHANGE UP (ref 98–107)
CO2 SERPL-SCNC: 28 MMOL/L — SIGNIFICANT CHANGE UP (ref 22–31)
CREAT SERPL-MCNC: 0.91 MG/DL — SIGNIFICANT CHANGE UP (ref 0.5–1.3)
EOSINOPHIL # BLD AUTO: 0.02 K/UL — SIGNIFICANT CHANGE UP (ref 0–0.5)
EOSINOPHIL NFR BLD AUTO: 0.2 % — SIGNIFICANT CHANGE UP (ref 0–6)
GLUCOSE SERPL-MCNC: 125 MG/DL — HIGH (ref 70–99)
HCT VFR BLD CALC: 42 % — SIGNIFICANT CHANGE UP (ref 34.5–45)
HGB BLD-MCNC: 12.6 G/DL — SIGNIFICANT CHANGE UP (ref 11.5–15.5)
IMM GRANULOCYTES # BLD AUTO: 0.03 # — SIGNIFICANT CHANGE UP
IMM GRANULOCYTES NFR BLD AUTO: 0.4 % — SIGNIFICANT CHANGE UP (ref 0–1.5)
LYMPHOCYTES # BLD AUTO: 2.64 K/UL — SIGNIFICANT CHANGE UP (ref 1–3.3)
LYMPHOCYTES # BLD AUTO: 32.5 % — SIGNIFICANT CHANGE UP (ref 13–44)
MAGNESIUM SERPL-MCNC: 2.1 MG/DL — SIGNIFICANT CHANGE UP (ref 1.6–2.6)
MCHC RBC-ENTMCNC: 26.9 PG — LOW (ref 27–34)
MCHC RBC-ENTMCNC: 30 % — LOW (ref 32–36)
MCV RBC AUTO: 89.6 FL — SIGNIFICANT CHANGE UP (ref 80–100)
MONOCYTES # BLD AUTO: 0.59 K/UL — SIGNIFICANT CHANGE UP (ref 0–0.9)
MONOCYTES NFR BLD AUTO: 7.3 % — SIGNIFICANT CHANGE UP (ref 2–14)
NEUTROPHILS # BLD AUTO: 4.82 K/UL — SIGNIFICANT CHANGE UP (ref 1.8–7.4)
NEUTROPHILS NFR BLD AUTO: 59.4 % — SIGNIFICANT CHANGE UP (ref 43–77)
NRBC # FLD: 0 — SIGNIFICANT CHANGE UP
PHOSPHATE SERPL-MCNC: 3.6 MG/DL — SIGNIFICANT CHANGE UP (ref 2.5–4.5)
PLATELET # BLD AUTO: 283 K/UL — SIGNIFICANT CHANGE UP (ref 150–400)
PMV BLD: 11.1 FL — SIGNIFICANT CHANGE UP (ref 7–13)
POTASSIUM SERPL-MCNC: 4.3 MMOL/L — SIGNIFICANT CHANGE UP (ref 3.5–5.3)
POTASSIUM SERPL-SCNC: 4.3 MMOL/L — SIGNIFICANT CHANGE UP (ref 3.5–5.3)
RBC # BLD: 4.69 M/UL — SIGNIFICANT CHANGE UP (ref 3.8–5.2)
RBC # FLD: 16.6 % — HIGH (ref 10.3–14.5)
SODIUM SERPL-SCNC: 142 MMOL/L — SIGNIFICANT CHANGE UP (ref 135–145)
WBC # BLD: 8.12 K/UL — SIGNIFICANT CHANGE UP (ref 3.8–10.5)
WBC # FLD AUTO: 8.12 K/UL — SIGNIFICANT CHANGE UP (ref 3.8–10.5)

## 2017-11-01 PROCEDURE — 99222 1ST HOSP IP/OBS MODERATE 55: CPT

## 2017-11-01 PROCEDURE — 93306 TTE W/DOPPLER COMPLETE: CPT | Mod: 26

## 2017-11-01 RX ORDER — SUCRALFATE 1 G
1 TABLET ORAL
Qty: 0 | Refills: 0 | Status: DISCONTINUED | OUTPATIENT
Start: 2017-11-01 | End: 2017-11-02

## 2017-11-01 RX ORDER — LACTOBACILLUS ACIDOPHILUS 100MM CELL
1 CAPSULE ORAL
Qty: 0 | Refills: 0 | Status: DISCONTINUED | OUTPATIENT
Start: 2017-11-01 | End: 2017-11-02

## 2017-11-01 RX ORDER — PANTOPRAZOLE SODIUM 20 MG/1
40 TABLET, DELAYED RELEASE ORAL
Qty: 0 | Refills: 0 | Status: DISCONTINUED | OUTPATIENT
Start: 2017-11-01 | End: 2017-11-02

## 2017-11-01 RX ADMIN — ENOXAPARIN SODIUM 40 MILLIGRAM(S): 100 INJECTION SUBCUTANEOUS at 12:04

## 2017-11-01 RX ADMIN — GABAPENTIN 200 MILLIGRAM(S): 400 CAPSULE ORAL at 18:12

## 2017-11-01 RX ADMIN — PANTOPRAZOLE SODIUM 40 MILLIGRAM(S): 20 TABLET, DELAYED RELEASE ORAL at 18:11

## 2017-11-01 RX ADMIN — SODIUM CHLORIDE 3 MILLILITER(S): 9 INJECTION INTRAMUSCULAR; INTRAVENOUS; SUBCUTANEOUS at 22:15

## 2017-11-01 RX ADMIN — DULOXETINE HYDROCHLORIDE 60 MILLIGRAM(S): 30 CAPSULE, DELAYED RELEASE ORAL at 12:04

## 2017-11-01 RX ADMIN — SODIUM CHLORIDE 3 MILLILITER(S): 9 INJECTION INTRAMUSCULAR; INTRAVENOUS; SUBCUTANEOUS at 06:01

## 2017-11-01 RX ADMIN — GABAPENTIN 200 MILLIGRAM(S): 400 CAPSULE ORAL at 06:02

## 2017-11-01 RX ADMIN — LEVETIRACETAM 750 MILLIGRAM(S): 250 TABLET, FILM COATED ORAL at 06:03

## 2017-11-01 RX ADMIN — AMLODIPINE BESYLATE 5 MILLIGRAM(S): 2.5 TABLET ORAL at 06:02

## 2017-11-01 RX ADMIN — SIMVASTATIN 20 MILLIGRAM(S): 20 TABLET, FILM COATED ORAL at 22:15

## 2017-11-01 RX ADMIN — Medication 1 TABLET(S): at 18:12

## 2017-11-01 RX ADMIN — LEVETIRACETAM 750 MILLIGRAM(S): 250 TABLET, FILM COATED ORAL at 18:12

## 2017-11-01 RX ADMIN — LEVETIRACETAM 250 MILLIGRAM(S): 250 TABLET, FILM COATED ORAL at 22:15

## 2017-11-01 RX ADMIN — Medication 12.5 MILLIGRAM(S): at 18:10

## 2017-11-01 RX ADMIN — SODIUM CHLORIDE 3 MILLILITER(S): 9 INJECTION INTRAMUSCULAR; INTRAVENOUS; SUBCUTANEOUS at 13:03

## 2017-11-01 RX ADMIN — Medication 1: at 18:05

## 2017-11-01 RX ADMIN — Medication 50 MICROGRAM(S): at 06:02

## 2017-11-01 RX ADMIN — Medication 12.5 MILLIGRAM(S): at 06:02

## 2017-11-01 NOTE — CONSULT NOTE ADULT - PROBLEM SELECTOR RECOMMENDATION 3
- Follows at [facility], planned for repeat MRI Nov 7th - Follows at [facility], planned for repeat MRI Nov 7th  - On Neurontin for chronic facial neuropathic pain  - c/w Keppra as PTA

## 2017-11-01 NOTE — CONSULT NOTE ADULT - PROBLEM SELECTOR PROBLEM 6
Type 2 diabetes mellitus with other specified complication, without long-term current use of insulin

## 2017-11-01 NOTE — PROGRESS NOTE ADULT - SUBJECTIVE AND OBJECTIVE BOX
Anjum Connolly MD  Interventional Cardiology  [place] Office : [address] [place] [id number]  Tel:   [place] Office : [address] [facility] [place] [address]  Tel: [phone]  Cell : [phone]    Subjective : Pt lying in bed comfortable, not in distress, denies any chest pain or SOB, complains of chronic diarrhea  	  MEDICATIONS:  amLODIPine   Tablet 5 milliGRAM(s) Oral daily  enoxaparin Injectable 40 milliGRAM(s) SubCutaneous daily  metoprolol     tartrate 12.5 milliGRAM(s) Oral two times a day        DULoxetine 60 milliGRAM(s) Oral daily  gabapentin 200 milliGRAM(s) Oral two times a day  levETIRAcetam 250 milliGRAM(s) Oral at bedtime  levETIRAcetam 750 milliGRAM(s) Oral two times a day      dextrose 50% Injectable 12.5 Gram(s) IV Push once  dextrose 50% Injectable 25 Gram(s) IV Push once  dextrose 50% Injectable 25 Gram(s) IV Push once  dextrose Gel 1 Dose(s) Oral once PRN  glucagon  Injectable 1 milliGRAM(s) IntraMuscular once PRN  insulin lispro (HumaLOG) corrective regimen sliding scale   SubCutaneous three times a day before meals  insulin lispro (HumaLOG) corrective regimen sliding scale   SubCutaneous at bedtime  levothyroxine 50 MICROGram(s) Oral daily  simvastatin 20 milliGRAM(s) Oral at bedtime    dextrose 5%. 1000 milliLiter(s) IV Continuous <Continuous>      PHYSICAL EXAM:  T(C): 36.4 (11-01-17 @ 05:41), Max: 36.7 (10-31-17 @ 16:24)  HR: 70 (11-01-17 @ 05:41) (70 - 85)  BP: 133/81 (11-01-17 @ 05:41) (133/81 - 136/66)  RR: 17 (11-01-17 @ 05:41) (17 - 18)  SpO2: 98% (11-01-17 @ 05:41) (96% - 98%)  Wt(kg): --  I&O's Summary        Appearance: Normal	  HEENT:   Normal oral mucosa, PERRL, EOMI	  Cardiovascular: Normal S1 S2, No JVD, No murmurs, No edema  Respiratory: Lungs clear to auscultation	  Gastrointestinal:  Soft, Non-tender, + BS	  Extremities: Normal range of motion, No clubbing, cyanosis or edema                                    12.6   8.12  )-----------( 283      ( 01 Nov 2017 06:21 )             42.0     11-01    142  |  103  |  16  ----------------------------<  125<H>  4.3   |  28  |  0.91    Ca    8.6      01 Nov 2017 06:21  Phos  3.6     11-01  Mg     2.1     11-01    TPro  7.3  /  Alb  4.0  /  TBili  0.2  /  DBili  x   /  AST  18  /  ALT  22  /  AlkPhos  61  10-30    proBNP:   Lipid Profile:   HgA1c:   TSH:

## 2017-11-01 NOTE — CONSULT NOTE ADULT - PROBLEM SELECTOR RECOMMENDATION 6
- A1c in April 2017 was 5.6  - Insulin regimen while, will likely decrease oral antihyperglycemics on outpatient after discharge - A1c in April 2017 was 5.6  - Insulin coverage inpatient per primary team,   - on Metformin and Victoza PTA, plan to decrease oral antihyperglycemics on outpatient after discharge

## 2017-11-01 NOTE — PROGRESS NOTE ADULT - SUBJECTIVE AND OBJECTIVE BOX
CC:  CP    HPI:  currently no headache, no CP.  Patient with history of pituitary tumor s/p partial resection,  left eye blindness, seizures.     NEUROLOGIC EXAM  Mental Status:     Oriented to time/place/person; Good eye contact ; follow simple commands ;    speech fluent  CN:  left eye proptosis and ptosis.  Chronic blindness.  Pupil of the right eye is reactive to light. Right eye extra-ocular movement intact.  face symmetric, tongue midline.      Muscle Strength:	 Full strength 5/5, proximal and distal,  upper and lower extremities except left TA 4/5  Muscle Tone:	Normal tone  Deep Tendon Reflexes:         1+/4  : Biceps, Brachioradialis, Triceps Bilateral;  2+/4 : Pattelar, Ankle bilateral. No clonus.  Plantar Response:	Plantar reflexes flexion bilaterally  Sensation:		Intact to pain, light touch, temperature and vibration throughout.  Coordination/	No dysmetria in finger to nose test bilaterally  Cerebellum	  Tandem Gait/Romberg	not tested today.    A/P  1.  History fo brain tumor (pituitary mass s/p partial resection 1992).  Chronic left eye blindness.    Neurologically stable.  Patient denies any acute changes of her neurological status.  She has a f/u appoint at [facility] with her neuro-oncologist later this month.   2  Seizure, history of , continue keppra.  No active seizure in hospital.   3.  Continue to f/u with her neuro-oncologist as outpatient.   4. Continue cardiology w/u.    5.  Please call again if any new neurological issues arises.

## 2017-11-01 NOTE — CONSULT NOTE ADULT - SUBJECTIVE AND OBJECTIVE BOX
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     Chest pain intermittent since approx August 2017 - severity has improved since initial episode - unable to describe (pt limited historian) but states that it was an 8/10 2 nights ago but currently only a "light heaviness." CP described as heaviness in L chest, radiating to b/l UE, a/w diaphoresis and dry cough, self limited although unclear after what duration of time. Occasional palpitations. No associated SOB/N/V/F/C.  Does produce some whitish sputum. Patient has not noted an association with food/fluid intake however patient is a limited historian.  No know exacerbating/alleviating factors. Previous ST many years ago - normal per patient. Has never seen a cardiologist. Pain also not a/w exertion however patient has limited exercise capacity d/t gait instability - ambulates w/ rolling walker at baseline.     Pt also reports 2 year h/o intermittent diarrhea/abdominal discomfort a/w mucous (sometimes yellow/green) - watery on occassion, sometimes soft, rarely formed.  Patient does have episodes of fecal incontinence when she cannot make it to the bathroom in time.     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   Substance hx:  denies  Living Situation: Lives in split-level home w/ 15 steps, 24/7 HHA coverage   Occupation: retired RN    FAMILY HISTORY:  Mother - DM, HTN  Father Emphysema    [ ] Reviewed and non contributory    BASELINE ADLs (Prior to Admission):     [ ] Independent  [x ] Partially dependent  [ ] Fully dependent    AMBULATION (Prior to Admission):    [ ] Independant  [ ] Cane   [x ] Walker  [ ] Wheelchair  [ ] Bedbound    ADVANCE DIRECTIVES:   Surrogate decision-maker: Asked daughter to bring in HCP form  MOLST: asked daughter to bring in ACP documents otherwise will attemp to fill out MOLST    Allergies    aspirin (Nausea; Other)  D.A. Chewable (Other)  erythromycin (Rash)  Naprosyn (Other)  Talwin Compound (Urticaria; Rash)  Talwin Lactate (Other)    Intolerances      MEDICATIONS  (STANDING):  amLODIPine   Tablet 5 milliGRAM(s) Oral daily  dextrose 5%. 1000 milliLiter(s) (50 mL/Hr) IV Continuous <Continuous>  dextrose 50% Injectable 12.5 Gram(s) IV Push once  dextrose 50% Injectable 25 Gram(s) IV Push once  dextrose 50% Injectable 25 Gram(s) IV Push once  DULoxetine 60 milliGRAM(s) Oral daily  enoxaparin Injectable 40 milliGRAM(s) SubCutaneous daily  gabapentin 200 milliGRAM(s) Oral two times a day  insulin lispro (HumaLOG) corrective regimen sliding scale   SubCutaneous three times a day before meals  insulin lispro (HumaLOG) corrective regimen sliding scale   SubCutaneous at bedtime  levETIRAcetam 250 milliGRAM(s) Oral at bedtime  levETIRAcetam 750 milliGRAM(s) Oral two times a day  levothyroxine 50 MICROGram(s) Oral daily  metoprolol     tartrate 12.5 milliGRAM(s) Oral two times a day  simvastatin 20 milliGRAM(s) Oral at bedtime  sodium chloride 0.9% lock flush 3 milliLiter(s) IV Push every 8 hours    MEDICATIONS  (PRN):  dextrose Gel 1 Dose(s) Oral once PRN Blood Glucose LESS THAN 70 milliGRAM(s)/deciliter  glucagon  Injectable 1 milliGRAM(s) IntraMuscular once PRN Glucose LESS THAN 70 milligrams/deciliter       REVIEW OF SYSTEMS:   Source: [x ] Patient   [ ] Family  [x ] Team    Pain                     [x ] None  [ ] Mild  [ ] Moderate  [ ] Severe  Dyspnea             [x ] None  [ ] Mild  [ ] Moderate  [ ] Severe  Anxiety               [x ] None  [ ] Mild  [ ] Moderate  [ ] Severe  Fatigue               [x ] None  [ ] Mild  [ ] Moderate  [ ] Severe  Nausea               [x ] None  [ ] Mild  [ ] Moderate  [ ] Severe  Appetite Loss    [x ] None  [ ] Mild  [ ] Moderate  [ ] Severe  Constipation     [x ] None  [ ] Mild  [ ] Moderate  [ ] Severe    [ x] All other review of systems reviewed and negative unless noted above   [ ] Unable to obtain due to poor mentation           Physical Exam:  Vital Signs Last 24 Hrs  T(C): 36.4 (01 Nov 2017 05:41), Max: 36.7 (31 Oct 2017 16:24)  T(F): 97.6 (01 Nov 2017 05:41), Max: 98.1 (31 Oct 2017 21:54)  HR: 70 (01 Nov 2017 05:41) (70 - 85)  BP: 133/81 (01 Nov 2017 05:41) (133/81 - 136/66)  BP(mean): --  RR: 17 (01 Nov 2017 05:41) (17 - 18)  SpO2: 98% (01 Nov 2017 05:41) (96% - 98%)    Karnofsky Performance Score/Palliative Performance Status Version 2:    %    PHYSICAL EXAM:    GENERAL:  NAD           [ ] Alert     [ ] lethargic   [ ] Agitated   [ ] Cachexia   HEENT: NCAT, ESSENCE, EOMI      [ ] Moist Oral Mucosa      [ ] Dry Oral Mucosa    [ ] ET Tube    [ ] Trach     NECK: Supple, no JVD  BREASTS: deferred  BACK: No CVA tenderness, no spinal tenderness   [  ] Kyphosis   [  ] Scoliosis  RESPIRATORY: CTAB, no accessory muscle use      [ ] Tachypnea   [ ] Rhonchi  [ ] Crackles [ ] Wheezing   [ ] Audible excessive secretions   CARDIOVASCULAR: Regular S1S2                              [ ]  Murmur   [ ] Rub    [ ] Gallop  GI: +BS, soft, NT, ND, no guarding, no rebound tenderness         [ ] PEG  [ ] NGT   :  [ ] esposito  RECTAL: deferred  EXTREMITIES/MSK: FROM, non-tender, no joint swelling   VASCULAR:    [ ]  Edema        [ ] b/l dp pulses palpaple    [ ] Clubbing      [ ] Cyanosis  NEURO: A&Ox     [ ] focal weakness  [ ] facial asymmetry         [ ] Cognitive Impairment  SKIN: [ ] Rash      [ ] Ulcers   LYMPH: no LAD  PSYCH: calm, cooperative, pleasant                LABS:                        12.6   8.12  )-----------( 283      ( 01 Nov 2017 06:21 )             42.0     11-01    142  |  103  |  16  ----------------------------<  125<H>  4.3   |  28  |  0.91    Ca    8.6      01 Nov 2017 06:21  Phos  3.6     11-01  Mg     2.1     11-01    TPro  7.3  /  Alb  4.0  /  TBili  0.2  /  DBili  x   /  AST  18  /  ALT  22  /  AlkPhos  61  10-30        I&O's Summary      RADIOLOGY & ADDITIONAL STUDIES:  Reviewed in EMR   [ ] Personally reviewed     Oral Intake Ability  [ ] Unable/mouth care only  [ ] Minimal   [ ] Moderate   [ ] Full capacity   [ ] Protein Calorie Malnutrition [ ] Mild  [ ] Moderate  [ ] Severe   [ ] Shock  [ ] Septic  [ ] Hypovolemic  [ ] Neurogenic   [ ] Cardiogenic  [ ] Vasopressors x         minutes spent on ACP discussion including:   [ ] Goals of care		[ ] Resuscitation status        [] Prognosis		[] Hospice     [] Discharge planning	   [] Symptom management  [ ] Emotional support	[ ] Bereavement                    [] Care coordination with other disciplines    Family-Team meeting              Start time:		   End time:	         	Total Time:  Summary of Discussion:     CASE/PLAN DISCUSSED WITH:    [ ] PATIENT [ ] FAMILY [ ] PRIMARY TEAM  [ ]  [ ] SW  [ ] OTHER:   [ ] Primary team and consultant notes reviewed     __ Minutes spend on total encounter: more than 50% of the visit was spent counseling and/or coordinating care    Thank you for allowing me to participate in the care of this patient.  Please contact me anytime if any questions/concerns.  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     Chest pain intermittent since approx August 2017 - severity has improved since initial episode - unable to describe (pt limited historian) but states that it was an 8/10 2 nights ago but currently only a "light heaviness." CP described as heaviness in L chest, radiating to b/l UE, a/w diaphoresis and dry cough, self limited although unclear after what duration of time. Occasional palpitations. No associated SOB/N/V/F/C.  Does produce some whitish sputum. Patient has not noted an association with food/fluid intake however patient is a limited historian.  No know exacerbating/alleviating factors. Previous ST many years ago - normal per patient. Has never seen a cardiologist. Pain also not a/w exertion however patient has limited exercise capacity d/t gait instability - ambulates w/ rolling walker at baseline.     Pt also reports 2 year h/o intermittent diarrhea/abdominal discomfort a/w mucous (sometimes yellow/green) - watery on occassion, sometimes soft, rarely formed.  Patient does have episodes of fecal incontinence when she cannot make it to the bathroom in time.     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   Substance hx:  denies  Living Situation: Lives in split-level home w/ 15 steps, 24/7 HHA coverage   Occupation: retired RN    FAMILY HISTORY:  Mother - DM, HTN  Father Emphysema    [ ] Reviewed and non contributory    BASELINE ADLs (Prior to Admission):     [ ] Independent  [x ] Partially dependent  [ ] Fully dependent    AMBULATION (Prior to Admission):    [ ] Independant  [ ] Cane   [x ] Walker  [ ] Wheelchair  [ ] Bedbound    ADVANCE DIRECTIVES:   Surrogate decision-maker: Asked daughter to bring in HCP form  MOLST: asked daughter to bring in ACP documents otherwise will attemp to fill out MOLST    Allergies    aspirin (Nausea; Other)  D.A. Chewable (Other)  erythromycin (Rash)  Naprosyn (Other)  Talwin Compound (Urticaria; Rash)  Talwin Lactate (Other)    Intolerances      MEDICATIONS  (STANDING):  amLODIPine   Tablet 5 milliGRAM(s) Oral daily  dextrose 5%. 1000 milliLiter(s) (50 mL/Hr) IV Continuous <Continuous>  dextrose 50% Injectable 12.5 Gram(s) IV Push once  dextrose 50% Injectable 25 Gram(s) IV Push once  dextrose 50% Injectable 25 Gram(s) IV Push once  DULoxetine 60 milliGRAM(s) Oral daily  enoxaparin Injectable 40 milliGRAM(s) SubCutaneous daily  gabapentin 200 milliGRAM(s) Oral two times a day  insulin lispro (HumaLOG) corrective regimen sliding scale   SubCutaneous three times a day before meals  insulin lispro (HumaLOG) corrective regimen sliding scale   SubCutaneous at bedtime  levETIRAcetam 250 milliGRAM(s) Oral at bedtime  levETIRAcetam 750 milliGRAM(s) Oral two times a day  levothyroxine 50 MICROGram(s) Oral daily  metoprolol     tartrate 12.5 milliGRAM(s) Oral two times a day  simvastatin 20 milliGRAM(s) Oral at bedtime  sodium chloride 0.9% lock flush 3 milliLiter(s) IV Push every 8 hours    MEDICATIONS  (PRN):  dextrose Gel 1 Dose(s) Oral once PRN Blood Glucose LESS THAN 70 milliGRAM(s)/deciliter  glucagon  Injectable 1 milliGRAM(s) IntraMuscular once PRN Glucose LESS THAN 70 milligrams/deciliter       REVIEW OF SYSTEMS:   Source: [x ] Patient   [ ] Family  [x ] Team    Pain                     [x ] None  [ ] Mild  [ ] Moderate  [ ] Severe  Dyspnea             [x ] None  [ ] Mild  [ ] Moderate  [ ] Severe  Anxiety               [x ] None  [ ] Mild  [ ] Moderate  [ ] Severe  Fatigue               [x ] None  [ ] Mild  [ ] Moderate  [ ] Severe  Nausea               [x ] None  [ ] Mild  [ ] Moderate  [ ] Severe  Appetite Loss    [x ] None  [ ] Mild  [ ] Moderate  [ ] Severe  Constipation     [x ] None  [ ] Mild  [ ] Moderate  [ ] Severe    [ x] All other review of systems reviewed and negative unless noted above   [ ] Unable to obtain due to poor mentation           Physical Exam:  Vital Signs Last 24 Hrs  T(C): 36.4 (01 Nov 2017 05:41), Max: 36.7 (31 Oct 2017 16:24)  T(F): 97.6 (01 Nov 2017 05:41), Max: 98.1 (31 Oct 2017 21:54)  HR: 70 (01 Nov 2017 05:41) (70 - 85)  BP: 133/81 (01 Nov 2017 05:41) (133/81 - 136/66)  BP(mean): --  RR: 17 (01 Nov 2017 05:41) (17 - 18)  SpO2: 98% (01 Nov 2017 05:41) (96% - 98%)    Karnofsky Performance Score/Palliative Performance Status Version 2: 70   %    PHYSICAL EXAM:    GENERAL:  NAD           [x ] Alert     [ ] lethargic   [ ] Agitated   [ ] Cachexia   HEENT: L eye proptosis      [ x] Moist Oral Mucosa      [ ] Dry Oral Mucosa    [ ] ET Tube    [ ] Trach     NECK: Supple, no JVD  BREASTS: deferred  BACK: No CVA tenderness, no spinal tenderness   [  ] Kyphosis   [  ] Scoliosis  RESPIRATORY: CTAB, no accessory muscle use      [ ] Tachypnea   [ ] Rhonchi  [ ] Crackles [ ] Wheezing   [ ] Audible excessive secretions   CARDIOVASCULAR: Regular S1S2                              [ ]  Murmur   [ ] Rub    [ ] Gallop  GI: obese, +BS, soft, NT, ND, no guarding, no rebound tenderness         [ ] PEG  [ ] NGT   :  [ ] esposito  RECTAL: deferred  EXTREMITIES/MSK: L foot drop, non-tender, no joint swelling   VASCULAR:    [ ]  Edema      [ x] b/l dp pulses palpaple    [ ] Clubbing      [ ] Cyanosis  NEURO: A&Ox3     [x ] focal weakness  [x ] facial asymmetry         [x ] Cognitive Impairment  SKIN: [ ] Rash      [ ] Ulcers   LYMPH: no LAD  PSYCH: calm, cooperative, pleasant                LABS:                        12.6   8.12  )-----------( 283      ( 01 Nov 2017 06:21 )             42.0     11-01    142  |  103  |  16  ----------------------------<  125<H>  4.3   |  28  |  0.91    Ca    8.6      01 Nov 2017 06:21  Phos  3.6     11-01  Mg     2.1     11-01    TPro  7.3  /  Alb  4.0  /  TBili  0.2  /  DBili  x   /  AST  18  /  ALT  22  /  AlkPhos  61  10-30        I&O's Summary      RADIOLOGY & ADDITIONAL STUDIES:  Reviewed in EMR     Oral Intake Ability  [ ] Unable/mouth care only  [ ] Minimal   [ ] Moderate   [ x] Full capacity     CASE/PLAN DISCUSSED WITH:    [x ] PATIENT [x ] FAMILY [x ] PRIMARY TEAM  [ ]  [ ] SW  [ x] OTHER: np  [ ] Primary team and consultant notes reviewed     Thank you for allowing me to participate in the care of this patient.  Please contact me anytime if any questions/concerns.    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     Chest pain intermittent since approx August 2017 - severity has improved since initial episode - unable to describe (pt limited historian) but states that it was an 8/10 2 nights ago but currently only a "light heaviness." CP described as heaviness in L chest, radiating to b/l UE, a/w diaphoresis and dry cough, self limited although unclear after what duration of time. Occasional palpitations. No associated SOB/N/V/F/C.  Does produce some whitish sputum. Patient has not noted an association with food/fluid intake however patient is a limited historian.  No know exacerbating/alleviating factors. Previous ST many years ago - normal per patient. Has never seen a cardiologist. Pain also not a/w exertion however patient has limited exercise capacity d/t gait instability - ambulates w/ rolling walker at baseline.     Pt also reports 2 year h/o intermittent diarrhea/abdominal discomfort a/w mucous (sometimes yellow/green) - watery on occassion, sometimes soft, rarely formed.  Patient does have episodes of fecal incontinence when she cannot make it to the bathroom in time.     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   Substance hx:  denies  Living Situation: Lives in split-level home w/ 15 steps, 24/7 HHA coverage   Occupation: retired RN    FAMILY HISTORY:  Mother - DM, HTN  Father Emphysema    [ ] Reviewed and non contributory    BASELINE ADLs (Prior to Admission):     [ ] Independent  [x ] Partially dependent  [ ] Fully dependent    AMBULATION (Prior to Admission):    [ ] Independant  [ ] Cane   [x ] Walker  [ ] Wheelchair  [ ] Bedbound    ADVANCE DIRECTIVES:   Surrogate decision-maker: Asked daughter to bring in HCP form  MOLST: asked daughter to bring in ACP documents otherwise will attemp to fill out MOLST    Allergies    aspirin (Nausea; Other)  D.A. Chewable (Other)  erythromycin (Rash)  Naprosyn (Other)  Talwin Compound (Urticaria; Rash)  Talwin Lactate (Other)    Intolerances      MEDICATIONS  (STANDING):  amLODIPine   Tablet 5 milliGRAM(s) Oral daily  dextrose 5%. 1000 milliLiter(s) (50 mL/Hr) IV Continuous <Continuous>  dextrose 50% Injectable 12.5 Gram(s) IV Push once  dextrose 50% Injectable 25 Gram(s) IV Push once  dextrose 50% Injectable 25 Gram(s) IV Push once  DULoxetine 60 milliGRAM(s) Oral daily  enoxaparin Injectable 40 milliGRAM(s) SubCutaneous daily  gabapentin 200 milliGRAM(s) Oral two times a day  insulin lispro (HumaLOG) corrective regimen sliding scale   SubCutaneous three times a day before meals  insulin lispro (HumaLOG) corrective regimen sliding scale   SubCutaneous at bedtime  levETIRAcetam 250 milliGRAM(s) Oral at bedtime  levETIRAcetam 750 milliGRAM(s) Oral two times a day  levothyroxine 50 MICROGram(s) Oral daily  metoprolol     tartrate 12.5 milliGRAM(s) Oral two times a day  simvastatin 20 milliGRAM(s) Oral at bedtime  sodium chloride 0.9% lock flush 3 milliLiter(s) IV Push every 8 hours    MEDICATIONS  (PRN):  dextrose Gel 1 Dose(s) Oral once PRN Blood Glucose LESS THAN 70 milliGRAM(s)/deciliter  glucagon  Injectable 1 milliGRAM(s) IntraMuscular once PRN Glucose LESS THAN 70 milligrams/deciliter       REVIEW OF SYSTEMS:   Source: [x ] Patient   [ ] Family  [x ] Team    Pain                     [x ] None  [ ] Mild  [ ] Moderate  [ ] Severe  Dyspnea             [x ] None  [ ] Mild  [ ] Moderate  [ ] Severe  Anxiety               [x ] None  [ ] Mild  [ ] Moderate  [ ] Severe  Fatigue               [x ] None  [ ] Mild  [ ] Moderate  [ ] Severe  Nausea               [x ] None  [ ] Mild  [ ] Moderate  [ ] Severe  Appetite Loss    [x ] None  [ ] Mild  [ ] Moderate  [ ] Severe  Constipation     [x ] None  [ ] Mild  [ ] Moderate  [ ] Severe    [ x] All other review of systems reviewed and negative unless noted above   [ ] Unable to obtain due to poor mentation           Physical Exam:  Vital Signs Last 24 Hrs  T(C): 36.4 (01 Nov 2017 05:41), Max: 36.7 (31 Oct 2017 16:24)  T(F): 97.6 (01 Nov 2017 05:41), Max: 98.1 (31 Oct 2017 21:54)  HR: 70 (01 Nov 2017 05:41) (70 - 85)  BP: 133/81 (01 Nov 2017 05:41) (133/81 - 136/66)  BP(mean): --  RR: 17 (01 Nov 2017 05:41) (17 - 18)  SpO2: 98% (01 Nov 2017 05:41) (96% - 98%)    Karnofsky Performance Score/Palliative Performance Status Version 2: 70   %    PHYSICAL EXAM:    GENERAL:  NAD           [x ] Alert     [ ] lethargic   [ ] Agitated   [ ] Cachexia   HEENT: L eye proptosis      [ x] Moist Oral Mucosa      [ ] Dry Oral Mucosa    [ ] ET Tube    [ ] Trach     NECK: Supple, no JVD  BREASTS: deferred  BACK: No CVA tenderness, no spinal tenderness   [  ] Kyphosis   [  ] Scoliosis  RESPIRATORY: CTAB, no accessory muscle use      [ ] Tachypnea   [ ] Rhonchi  [ ] Crackles [ ] Wheezing   [ ] Audible excessive secretions   CARDIOVASCULAR: Regular S1S2                              [ ]  Murmur   [ ] Rub    [ ] Gallop  GI: obese, +BS, soft, NT, ND, no guarding, no rebound tenderness         [ ] PEG  [ ] NGT   :  [ ] esposito  RECTAL: deferred  EXTREMITIES/MSK: L foot drop, non-tender, no joint swelling   VASCULAR:    [ ]  Edema      [ x] b/l dp pulses palpaple    [ ] Clubbing      [ ] Cyanosis  NEURO: A&Ox3     [x ] focal weakness  [x ] facial asymmetry         [x ] Cognitive Impairment  SKIN: [ ] Rash      [ ] Ulcers   LYMPH: no LAD  PSYCH: calm, cooperative, pleasant                LABS:                        12.6   8.12  )-----------( 283      ( 01 Nov 2017 06:21 )             42.0     11-01    142  |  103  |  16  ----------------------------<  125<H>  4.3   |  28  |  0.91    Ca    8.6      01 Nov 2017 06:21  Phos  3.6     11-01  Mg     2.1     11-01    TPro  7.3  /  Alb  4.0  /  TBili  0.2  /  DBili  x   /  AST  18  /  ALT  22  /  AlkPhos  61  10-30        I&O's Summary      RADIOLOGY & ADDITIONAL STUDIES:  Reviewed in EMR     Oral Intake Ability  [ ] Unable/mouth care only  [ ] Minimal   [ ] Moderate   [ x] Full capacity     CASE/PLAN DISCUSSED WITH:    [x ] PATIENT [x ] FAMILY [x ] PRIMARY TEAM  [ ]  [ ] SW  [ x] OTHER: np  [ ] Primary team and consultant notes reviewed     Thank you for allowing me to participate in the care of this patient.  Please contact me anytime if any questions/concerns.    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     Chest pain intermittent since approx August 2017 - severity has improved since initial episode - unable to describe (pt limited historian) but states that it was an 8/10 2 nights ago but currently only a "light heaviness." CP described as heaviness in L chest, radiating to b/l UE, a/w diaphoresis and dry cough, self limited although unclear after what duration of time. Occasional palpitations. No associated SOB/N/V/F/C.  Does produce some whitish sputum. Patient has not noted an association with food/fluid intake however patient is a limited historian.  No know exacerbating/alleviating factors. Previous ST many years ago - normal per patient. Has never seen a cardiologist. Pain also not a/w exertion however patient has limited exercise capacity d/t gait instability - ambulates w/ rolling walker at baseline.     Pt also reports 2 year h/o intermittent diarrhea/abdominal discomfort a/w mucous (sometimes yellow/green) - watery on occassion, sometimes soft, rarely formed.  Patient does have episodes of fecal incontinence when she cannot make it to the bathroom in time.     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   Substance hx:  denies  Living Situation: Lives in split-level home w/ 15 steps, 24/7 HHA coverage   Occupation: retired RN    FAMILY HISTORY:  Mother - DM, HTN  Father Emphysema    [ ] Reviewed and non contributory    BASELINE ADLs (Prior to Admission):     [ ] Independent  [x ] Partially dependent  [ ] Fully dependent    AMBULATION (Prior to Admission):    [ ] Independant  [ ] Cane   [x ] Walker  [ ] Wheelchair  [ ] Bedbound    ADVANCE DIRECTIVES:   Surrogate decision-maker: Asked daughter to bring in HCP form  MOLST: asked daughter to bring in ACP documents otherwise will attemp to fill out MOLST    Allergies    aspirin (Nausea; Other)  D.A. Chewable (Other)  erythromycin (Rash)  Naprosyn (Other)  Talwin Compound (Urticaria; Rash)  Talwin Lactate (Other)    Intolerances      MEDICATIONS  (STANDING):  amLODIPine   Tablet 5 milliGRAM(s) Oral daily  dextrose 5%. 1000 milliLiter(s) (50 mL/Hr) IV Continuous <Continuous>  dextrose 50% Injectable 12.5 Gram(s) IV Push once  dextrose 50% Injectable 25 Gram(s) IV Push once  dextrose 50% Injectable 25 Gram(s) IV Push once  DULoxetine 60 milliGRAM(s) Oral daily  enoxaparin Injectable 40 milliGRAM(s) SubCutaneous daily  gabapentin 200 milliGRAM(s) Oral two times a day  insulin lispro (HumaLOG) corrective regimen sliding scale   SubCutaneous three times a day before meals  insulin lispro (HumaLOG) corrective regimen sliding scale   SubCutaneous at bedtime  levETIRAcetam 250 milliGRAM(s) Oral at bedtime  levETIRAcetam 750 milliGRAM(s) Oral two times a day  levothyroxine 50 MICROGram(s) Oral daily  metoprolol     tartrate 12.5 milliGRAM(s) Oral two times a day  simvastatin 20 milliGRAM(s) Oral at bedtime  sodium chloride 0.9% lock flush 3 milliLiter(s) IV Push every 8 hours    MEDICATIONS  (PRN):  dextrose Gel 1 Dose(s) Oral once PRN Blood Glucose LESS THAN 70 milliGRAM(s)/deciliter  glucagon  Injectable 1 milliGRAM(s) IntraMuscular once PRN Glucose LESS THAN 70 milligrams/deciliter       REVIEW OF SYSTEMS:   Source: [x ] Patient   [ ] Family  [x ] Team    Pain                     [x ] None  [ ] Mild  [ ] Moderate  [ ] Severe  Dyspnea             [x ] None  [ ] Mild  [ ] Moderate  [ ] Severe  Anxiety               [x ] None  [ ] Mild  [ ] Moderate  [ ] Severe  Fatigue               [x ] None  [ ] Mild  [ ] Moderate  [ ] Severe  Nausea               [x ] None  [ ] Mild  [ ] Moderate  [ ] Severe  Appetite Loss    [x ] None  [ ] Mild  [ ] Moderate  [ ] Severe  Constipation     [x ] None  [ ] Mild  [ ] Moderate  [ ] Severe    [ x] All other review of systems reviewed and negative unless noted above   [ ] Unable to obtain due to poor mentation           Physical Exam:  Vital Signs Last 24 Hrs  T(C): 36.4 (01 Nov 2017 05:41), Max: 36.7 (31 Oct 2017 16:24)  T(F): 97.6 (01 Nov 2017 05:41), Max: 98.1 (31 Oct 2017 21:54)  HR: 70 (01 Nov 2017 05:41) (70 - 85)  BP: 133/81 (01 Nov 2017 05:41) (133/81 - 136/66)  BP(mean): --  RR: 17 (01 Nov 2017 05:41) (17 - 18)  SpO2: 98% (01 Nov 2017 05:41) (96% - 98%)    Karnofsky Performance Score/Palliative Performance Status Version 2: 70   %    PHYSICAL EXAM:    GENERAL:  NAD           [x ] Alert     [ ] lethargic   [ ] Agitated   [ ] Cachexia   HEENT: L eye proptosis      [ x] Moist Oral Mucosa      [ ] Dry Oral Mucosa    [ ] ET Tube    [ ] Trach     NECK: Supple, no JVD  BREASTS: deferred  BACK: No CVA tenderness, no spinal tenderness   [  ] Kyphosis   [  ] Scoliosis  RESPIRATORY: CTAB, no accessory muscle use      [ ] Tachypnea   [ ] Rhonchi  [ ] Crackles [ ] Wheezing   [ ] Audible excessive secretions   CARDIOVASCULAR: Regular S1S2                              [ ]  Murmur   [ ] Rub    [ ] Gallop  GI: obese, +BS, soft, NT, ND, no guarding, no rebound tenderness         [ ] PEG  [ ] NGT   :  [ ] esposito  RECTAL: deferred  EXTREMITIES/MSK: L foot drop, non-tender, no joint swelling   VASCULAR:    [ ]  Edema      [ x] b/l dp pulses palpaple    [ ] Clubbing      [ ] Cyanosis  NEURO: A&Ox3     [x ] focal weakness  [x ] facial asymmetry         [x ] Cognitive Impairment  SKIN: [ ] Rash      [ ] Ulcers   LYMPH: no LAD  PSYCH: calm, cooperative, pleasant                LABS:                        12.6   8.12  )-----------( 283      ( 01 Nov 2017 06:21 )             42.0     11-01    142  |  103  |  16  ----------------------------<  125<H>  4.3   |  28  |  0.91    Ca    8.6      01 Nov 2017 06:21  Phos  3.6     11-01  Mg     2.1     11-01    TPro  7.3  /  Alb  4.0  /  TBili  0.2  /  DBili  x   /  AST  18  /  ALT  22  /  AlkPhos  61  10-30        I&O's Summary      RADIOLOGY & ADDITIONAL STUDIES:  Reviewed in EMR     Oral Intake Ability  [ ] Unable/mouth care only  [ ] Minimal   [ ] Moderate   [ x] Full capacity     CASE/PLAN DISCUSSED WITH:    [x ] PATIENT [x ] FAMILY [x ] PRIMARY TEAM  [ ]  [ ] SW  [ x] OTHER: np  [ ] Primary team and consultant notes reviewed

## 2017-11-01 NOTE — CONSULT NOTE ADULT - PROBLEM SELECTOR RECOMMENDATION 5
- stable  - on Duloxetine PTA, at her recent/initial visit with me we discussed switching to another antidepressant/antipsychotic and possible psych referral - will hold off for now in setting of current w/u for CP and plan to f/u on outpatient

## 2017-11-01 NOTE — CONSULT NOTE ADULT - PROBLEM SELECTOR PROBLEM 2
Gastroesophageal reflux disease, esophagitis presence not specified
Seizure
Diarrhea, unspecified type

## 2017-11-01 NOTE — CONSULT NOTE ADULT - PROBLEM SELECTOR RECOMMENDATION 4
- MMSE of 27 with abnormal CDT  - c/w supportive care and assitance w/ ADLs  - Has 24/7 HHA coverage at home - stable  - MMSE of 27 with abnormal CDT on our recent/initial evaluation 10/23/17  - c/w supportive care and assitance w/ ADLs  - Avoid sedative/mood altering medications  - Provide daytime sunlight whenever possible  - OOB to chair whenever possible   - Pain control when needed  - If patient becomes delirious and poses a danger to self or others around her, and all behavioral interventions possible were tried and ineffective can try Haldol PO/IV/IM 0.5mg q6h PRN OR Seroquel 12.5mg q6h PRN, and monitor EKG and QTc while on antipsychotics  - Has 24/7 A coverage at home

## 2017-11-01 NOTE — CONSULT NOTE ADULT - ASSESSMENT
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

## 2017-11-01 NOTE — CONSULT NOTE ADULT - PROBLEM SELECTOR RECOMMENDATION 9
- Cardiac vs GI etiology?   - c/w workup per per primary team - Cardiac vs GI etiology?   - c/w workup per primary team - on lovenox

## 2017-11-01 NOTE — CONSULT NOTE ADULT - PROBLEM SELECTOR RECOMMENDATION 3
- intermittent between loose and soft stools   - monitor bm frequency and consistency   - bacid tid  - check stool culture  - check ova and parasites  - check c.diff  - pt's last colonoscopy was about 3 years ago with patient awaiting family to bring in report

## 2017-11-01 NOTE — CONSULT NOTE ADULT - ATTENDING COMMENTS
Thank you for allowing me to participate in the care of this patient.  Please contact me anytime if any questions/concerns.    Alysia Caballero MD  [facility]  Division of Geriatric & Palliative Medicine  [phone]  [email or web address]

## 2017-11-01 NOTE — CONSULT NOTE ADULT - SUBJECTIVE AND OBJECTIVE BOX
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 Patient states she has been L sided chest pain which radiates to b/l arm and is associated with diaphoresis since August. She was seen at urgent care in August and was advised to follow up with PMD. Patient went to PMD, but had no further work up and patient went to vacation to [place] for two weeks. Patient was then getting symptoms intermittent for two months. On the morning of admission she woke up with severe L sided chest pain which was associated with diaphoresis and radiation to b/l UE. Her PMDs office normally checks on her monthly through phone, and when she told them about the chest pain she was instructed to come to ED further eval. The patient has also been with intermittent loose stools with occasional incontinence from not being able to make it to the toilet. She states her stools are always brown in color and vary between loose and soft consistencies mixed with some mucous. She states her last colonoscopy was about 3 years ago and is not aware of any abnormal findings, however, she is waiting for her family to bring her records in from home. Unclear if she had an EGD in the past. Denies fever, chills, cough, falls, LOC, SOB, GOMEZ, abdominal pain, nausea, vomiting, calf tenderness, dysuria, constipation, melena, or hematochezia    On admission: chest pain free. (30 Oct 2017 23:08)      aspirin (Nausea; Other)  D.A. Chewable (Other)  erythromycin (Rash)  Naprosyn (Other)  Talwin Compound (Urticaria; Rash)  Talwin Lactate (Other)      aluminum hydroxide/magnesium hydroxide/simethicone Suspension 30 milliLiter(s) Oral every 4 hours PRN  amLODIPine   Tablet 5 milliGRAM(s) Oral daily  dextrose 5%. 1000 milliLiter(s) IV Continuous <Continuous>  dextrose 50% Injectable 12.5 Gram(s) IV Push once  dextrose 50% Injectable 25 Gram(s) IV Push once  dextrose 50% Injectable 25 Gram(s) IV Push once  dextrose Gel 1 Dose(s) Oral once PRN  DULoxetine 60 milliGRAM(s) Oral daily  enoxaparin Injectable 40 milliGRAM(s) SubCutaneous daily  gabapentin 200 milliGRAM(s) Oral two times a day  glucagon  Injectable 1 milliGRAM(s) IntraMuscular once PRN  insulin lispro (HumaLOG) corrective regimen sliding scale   SubCutaneous three times a day before meals  insulin lispro (HumaLOG) corrective regimen sliding scale   SubCutaneous at bedtime  lactobacillus acidophilus 1 Tablet(s) Oral three times a day with meals  levETIRAcetam 250 milliGRAM(s) Oral at bedtime  levETIRAcetam 750 milliGRAM(s) Oral two times a day  levothyroxine 50 MICROGram(s) Oral daily  metoprolol     tartrate 12.5 milliGRAM(s) Oral two times a day  pantoprazole    Tablet 40 milliGRAM(s) Oral two times a day before meals  simvastatin 20 milliGRAM(s) Oral at bedtime  sodium chloride 0.9% lock flush 3 milliLiter(s) IV Push every 8 hours  sucralfate 1 Gram(s) Oral four times a day        FAMILY HISTORY:  No pertinent family history in first degree relatives        Review of Systems:    General:  No wt loss, fevers, chills, night sweats, fatigue  Eyes:  Good vision, no reported pain  ENT:  No sore throat, pain, runny nose, dysphagia  CV:  No pain, palpitations, no lightheadedness  Resp:  No dyspnea, cough, tachypnea, wheezing  GI: intermittent diarrhea; denies n/v/c, abdominal pain, melena or brbpr   :  No pain, bleeding, incontinence, nocturia  Muscle:  No pain, weakness  Neuro:  No weakness, tingling, memory problems  Psych:  No fatigue, insomnia, mood problems, depression  Endocrine:  No polyuria, polydypsia, cold/heat intolerance  Heme:  No petechiae, ecchymosis, easy bruisability  Skin:  No rash, tattoos, scars, edema    Relevant Family History:   n/c    Relevant Social History: n/c      Physical Exam:    Vital Signs:  Vital Signs Last 24 Hrs  T(C): 36.4 (2017 05:41), Max: 36.7 (31 Oct 2017 16:24)  T(F): 97.6 (2017 05:41), Max: 98.1 (31 Oct 2017 21:54)  HR: 70 (2017 05:41) (70 - 85)  BP: 133/81 (2017 05:41) (133/81 - 136/66)  BP(mean): --  RR: 17 (2017 05:41) (17 - 18)  SpO2: 98% (2017 05:41) (96% - 98%)  Daily     Daily Weight in k.8 (2017 05:41)    General:  Appears stated age, well-groomed, nad  HEENT:  NC/AT,  conjunctivae clear and pink, no thyromegaly, nodules, adenopathy, no JVD  Chest:  Full & symmetric excursion, no increased effort, breath sounds clear  Cardiovascular:  Regular rhythm, S1, S2, no murmur/rub/S3/S4, no abdominal bruit, no edema  Abdomen:  Soft, non-tender, non-distended, normoactive bowel sounds,  no masses ,no hepatosplenomeagaly, no signs of chronic liver disease  Extremities:  no cyanosis,clubbing or edema  Skin:  No rash/erythema/ecchymoses/petechiae/wounds/abscess/warm/dry  Neuro/Psych:  A&O2-3(poor historian), no asterixis, no tremor, no encephalopathy    Laboratory:                            12.6   8.12  )-----------( 283      ( 2017 06:21 )             42.0     -    142  |  103  |  16  ----------------------------<  125<H>  4.3   |  28  |  0.91    Ca    8.6      2017 06:21  Phos  3.6     -  Mg     2.1         TPro  7.3  /  Alb  4.0  /  TBili  0.2  /  DBili  x   /  AST  18  /  ALT  22  /  AlkPhos  61  10-30    LIVER FUNCTIONS - ( 30 Oct 2017 18:40 )  Alb: 4.0 g/dL / Pro: 7.3 g/dL / ALK PHOS: 61 u/L / ALT: 22 u/L / AST: 18 u/L / GGT: x                 Imaging:

## 2017-11-01 NOTE — PROGRESS NOTE ADULT - SUBJECTIVE AND OBJECTIVE BOX
Subjective : Pt lying in bed comfortable, not in distress, denies any chest pain or SOB, complains of chronic diarrhea  	  MEDICATIONS:  amLODIPine   Tablet 5 milliGRAM(s) Oral daily  enoxaparin Injectable 40 milliGRAM(s) SubCutaneous daily  metoprolol     tartrate 12.5 milliGRAM(s) Oral two times a day        DULoxetine 60 milliGRAM(s) Oral daily  gabapentin 200 milliGRAM(s) Oral two times a day  levETIRAcetam 250 milliGRAM(s) Oral at bedtime  levETIRAcetam 750 milliGRAM(s) Oral two times a day      dextrose 50% Injectable 12.5 Gram(s) IV Push once  dextrose 50% Injectable 25 Gram(s) IV Push once  dextrose 50% Injectable 25 Gram(s) IV Push once  dextrose Gel 1 Dose(s) Oral once PRN  glucagon  Injectable 1 milliGRAM(s) IntraMuscular once PRN  insulin lispro (HumaLOG) corrective regimen sliding scale   SubCutaneous three times a day before meals  insulin lispro (HumaLOG) corrective regimen sliding scale   SubCutaneous at bedtime  levothyroxine 50 MICROGram(s) Oral daily  simvastatin 20 milliGRAM(s) Oral at bedtime    dextrose 5%. 1000 milliLiter(s) IV Continuous <Continuous>      PHYSICAL EXAM:  T(C): 36.4 (11-01-17 @ 05:41), Max: 36.7 (10-31-17 @ 16:24)  HR: 70 (11-01-17 @ 05:41) (70 - 85)  BP: 133/81 (11-01-17 @ 05:41) (133/81 - 136/66)  RR: 17 (11-01-17 @ 05:41) (17 - 18)  SpO2: 98% (11-01-17 @ 05:41) (96% - 98%)  Wt(kg): --  I&O's Summary        Appearance: Normal	  HEENT:   Normal oral mucosa, PERRL, EOMI	  Cardiovascular: Normal S1 S2, No JVD, No murmurs, No edema  Respiratory: Lungs clear to auscultation	  Gastrointestinal:  Soft, Non-tender, + BS	  Extremities: Normal range of motion, No clubbing, cyanosis or edema                                    12.6   8.12  )-----------( 283      ( 01 Nov 2017 06:21 )             42.0     11-01    142  |  103  |  16  ----------------------------<  125<H>  4.3   |  28  |  0.91    Ca    8.6      01 Nov 2017 06:21  Phos  3.6     11-01  Mg     2.1     11-01    TPro  7.3  /  Alb  4.0  /  TBili  0.2  /  DBili  x   /  AST  18  /  ALT  22  /  AlkPhos  61  10-30    proBNP:   Lipid Profile:   HgA1c:   TSH:

## 2017-11-01 NOTE — CONSULT NOTE ADULT - PROBLEM SELECTOR RECOMMENDATION 8
- on lovenox - patient has 3 children - I have asked Araceli to bring in previously filled out ACP documents (?HCP/?MOLST) - otherwise will address and complete

## 2017-11-01 NOTE — CONSULT NOTE ADULT - PROBLEM SELECTOR RECOMMENDATION 7
- patient has 3 children - I have asked Araceli to bring in previously filled out ACP documents (?HCP/?MOLST) - otherwise will address and complete - Consider PT Eval

## 2017-11-01 NOTE — CONSULT NOTE ADULT - PROBLEM SELECTOR RECOMMENDATION 4
- she is followed by neuro-oncology at [facility]  - neurology following; rec to continue keppra for h/o seizures and outpt fu at [facility] post discharge

## 2017-11-02 ENCOUNTER — TRANSCRIPTION ENCOUNTER (OUTPATIENT)
Age: 77
End: 2017-11-02

## 2017-11-02 VITALS
RESPIRATION RATE: 18 BRPM | OXYGEN SATURATION: 99 % | SYSTOLIC BLOOD PRESSURE: 140 MMHG | DIASTOLIC BLOOD PRESSURE: 71 MMHG | HEART RATE: 71 BPM

## 2017-11-02 LAB
BASOPHILS # BLD AUTO: 0.03 K/UL — SIGNIFICANT CHANGE UP (ref 0–0.2)
BASOPHILS NFR BLD AUTO: 0.3 % — SIGNIFICANT CHANGE UP (ref 0–2)
BUN SERPL-MCNC: 22 MG/DL — SIGNIFICANT CHANGE UP (ref 7–23)
CALCIUM SERPL-MCNC: 8.8 MG/DL — SIGNIFICANT CHANGE UP (ref 8.4–10.5)
CHLORIDE SERPL-SCNC: 102 MMOL/L — SIGNIFICANT CHANGE UP (ref 98–107)
CO2 SERPL-SCNC: 29 MMOL/L — SIGNIFICANT CHANGE UP (ref 22–31)
CREAT SERPL-MCNC: 1.05 MG/DL — SIGNIFICANT CHANGE UP (ref 0.5–1.3)
EOSINOPHIL # BLD AUTO: 0.02 K/UL — SIGNIFICANT CHANGE UP (ref 0–0.5)
EOSINOPHIL NFR BLD AUTO: 0.2 % — SIGNIFICANT CHANGE UP (ref 0–6)
GLUCOSE SERPL-MCNC: 131 MG/DL — HIGH (ref 70–99)
HCT VFR BLD CALC: 42.7 % — SIGNIFICANT CHANGE UP (ref 34.5–45)
HGB BLD-MCNC: 12.7 G/DL — SIGNIFICANT CHANGE UP (ref 11.5–15.5)
IMM GRANULOCYTES # BLD AUTO: 0.04 # — SIGNIFICANT CHANGE UP
IMM GRANULOCYTES NFR BLD AUTO: 0.4 % — SIGNIFICANT CHANGE UP (ref 0–1.5)
LYMPHOCYTES # BLD AUTO: 2.84 K/UL — SIGNIFICANT CHANGE UP (ref 1–3.3)
LYMPHOCYTES # BLD AUTO: 27.2 % — SIGNIFICANT CHANGE UP (ref 13–44)
MAGNESIUM SERPL-MCNC: 2 MG/DL — SIGNIFICANT CHANGE UP (ref 1.6–2.6)
MCHC RBC-ENTMCNC: 26.7 PG — LOW (ref 27–34)
MCHC RBC-ENTMCNC: 29.7 % — LOW (ref 32–36)
MCV RBC AUTO: 89.9 FL — SIGNIFICANT CHANGE UP (ref 80–100)
MONOCYTES # BLD AUTO: 0.66 K/UL — SIGNIFICANT CHANGE UP (ref 0–0.9)
MONOCYTES NFR BLD AUTO: 6.3 % — SIGNIFICANT CHANGE UP (ref 2–14)
NEUTROPHILS # BLD AUTO: 6.85 K/UL — SIGNIFICANT CHANGE UP (ref 1.8–7.4)
NEUTROPHILS NFR BLD AUTO: 65.6 % — SIGNIFICANT CHANGE UP (ref 43–77)
NRBC # FLD: 0 — SIGNIFICANT CHANGE UP
PLATELET # BLD AUTO: 297 K/UL — SIGNIFICANT CHANGE UP (ref 150–400)
PMV BLD: 11.1 FL — SIGNIFICANT CHANGE UP (ref 7–13)
POTASSIUM SERPL-MCNC: 3.8 MMOL/L — SIGNIFICANT CHANGE UP (ref 3.5–5.3)
POTASSIUM SERPL-SCNC: 3.8 MMOL/L — SIGNIFICANT CHANGE UP (ref 3.5–5.3)
RBC # BLD: 4.75 M/UL — SIGNIFICANT CHANGE UP (ref 3.8–5.2)
RBC # FLD: 16.3 % — HIGH (ref 10.3–14.5)
SODIUM SERPL-SCNC: 141 MMOL/L — SIGNIFICANT CHANGE UP (ref 135–145)
WBC # BLD: 10.44 K/UL — SIGNIFICANT CHANGE UP (ref 3.8–10.5)
WBC # FLD AUTO: 10.44 K/UL — SIGNIFICANT CHANGE UP (ref 3.8–10.5)

## 2017-11-02 PROCEDURE — 99232 SBSQ HOSP IP/OBS MODERATE 35: CPT

## 2017-11-02 RX ORDER — SUCRALFATE 1 G
1 TABLET ORAL
Qty: 56 | Refills: 0
Start: 2017-11-02 | End: 2017-11-16

## 2017-11-02 RX ORDER — LACTOBACILLUS ACIDOPHILUS 100MM CELL
1 CAPSULE ORAL
Qty: 0 | Refills: 0 | DISCHARGE
Start: 2017-11-02

## 2017-11-02 RX ORDER — PANTOPRAZOLE SODIUM 20 MG/1
1 TABLET, DELAYED RELEASE ORAL
Qty: 60 | Refills: 0
Start: 2017-11-02 | End: 2017-12-02

## 2017-11-02 RX ADMIN — GABAPENTIN 200 MILLIGRAM(S): 400 CAPSULE ORAL at 17:52

## 2017-11-02 RX ADMIN — PANTOPRAZOLE SODIUM 40 MILLIGRAM(S): 20 TABLET, DELAYED RELEASE ORAL at 06:33

## 2017-11-02 RX ADMIN — SODIUM CHLORIDE 3 MILLILITER(S): 9 INJECTION INTRAMUSCULAR; INTRAVENOUS; SUBCUTANEOUS at 13:19

## 2017-11-02 RX ADMIN — ENOXAPARIN SODIUM 40 MILLIGRAM(S): 100 INJECTION SUBCUTANEOUS at 11:50

## 2017-11-02 RX ADMIN — SODIUM CHLORIDE 3 MILLILITER(S): 9 INJECTION INTRAMUSCULAR; INTRAVENOUS; SUBCUTANEOUS at 06:33

## 2017-11-02 RX ADMIN — Medication 1 TABLET(S): at 10:08

## 2017-11-02 RX ADMIN — LEVETIRACETAM 750 MILLIGRAM(S): 250 TABLET, FILM COATED ORAL at 17:51

## 2017-11-02 RX ADMIN — Medication 12.5 MILLIGRAM(S): at 17:52

## 2017-11-02 RX ADMIN — Medication 1: at 12:42

## 2017-11-02 RX ADMIN — DULOXETINE HYDROCHLORIDE 60 MILLIGRAM(S): 30 CAPSULE, DELAYED RELEASE ORAL at 11:50

## 2017-11-02 RX ADMIN — Medication 1 TABLET(S): at 11:50

## 2017-11-02 RX ADMIN — Medication 1 GRAM(S): at 11:50

## 2017-11-02 RX ADMIN — Medication 1 GRAM(S): at 17:51

## 2017-11-02 RX ADMIN — LEVETIRACETAM 750 MILLIGRAM(S): 250 TABLET, FILM COATED ORAL at 06:33

## 2017-11-02 RX ADMIN — Medication 12.5 MILLIGRAM(S): at 06:33

## 2017-11-02 RX ADMIN — GABAPENTIN 200 MILLIGRAM(S): 400 CAPSULE ORAL at 06:33

## 2017-11-02 RX ADMIN — AMLODIPINE BESYLATE 5 MILLIGRAM(S): 2.5 TABLET ORAL at 06:33

## 2017-11-02 RX ADMIN — Medication 1 GRAM(S): at 06:33

## 2017-11-02 RX ADMIN — Medication 50 MICROGRAM(S): at 06:33

## 2017-11-02 RX ADMIN — Medication 1 TABLET(S): at 17:51

## 2017-11-02 NOTE — PROGRESS NOTE ADULT - SUBJECTIVE AND OBJECTIVE BOX
INTERVAL HPI/OVERNIGHT EVENTS:    pt reports improvement in abdominal discomfort   denied any loose bm this morning    MEDICATIONS  (STANDING):  amLODIPine   Tablet 5 milliGRAM(s) Oral daily  dextrose 5%. 1000 milliLiter(s) (50 mL/Hr) IV Continuous <Continuous>  dextrose 50% Injectable 12.5 Gram(s) IV Push once  dextrose 50% Injectable 25 Gram(s) IV Push once  dextrose 50% Injectable 25 Gram(s) IV Push once  DULoxetine 60 milliGRAM(s) Oral daily  enoxaparin Injectable 40 milliGRAM(s) SubCutaneous daily  gabapentin 200 milliGRAM(s) Oral two times a day  insulin lispro (HumaLOG) corrective regimen sliding scale   SubCutaneous three times a day before meals  insulin lispro (HumaLOG) corrective regimen sliding scale   SubCutaneous at bedtime  lactobacillus acidophilus 1 Tablet(s) Oral three times a day with meals  levETIRAcetam 250 milliGRAM(s) Oral at bedtime  levETIRAcetam 750 milliGRAM(s) Oral two times a day  levothyroxine 50 MICROGram(s) Oral daily  metoprolol     tartrate 12.5 milliGRAM(s) Oral two times a day  pantoprazole    Tablet 40 milliGRAM(s) Oral two times a day before meals  simvastatin 20 milliGRAM(s) Oral at bedtime  sodium chloride 0.9% lock flush 3 milliLiter(s) IV Push every 8 hours  sucralfate 1 Gram(s) Oral four times a day    MEDICATIONS  (PRN):  aluminum hydroxide/magnesium hydroxide/simethicone Suspension 30 milliLiter(s) Oral every 4 hours PRN Dyspepsia  dextrose Gel 1 Dose(s) Oral once PRN Blood Glucose LESS THAN 70 milliGRAM(s)/deciliter  glucagon  Injectable 1 milliGRAM(s) IntraMuscular once PRN Glucose LESS THAN 70 milligrams/deciliter      Allergies    aspirin (Nausea; Other)  D.A. Chewable (Other)  erythromycin (Rash)  Naprosyn (Other)  Talwin Compound (Urticaria; Rash)  Talwin Lactate (Other)    Intolerances        Review of Systems:    General:  No wt loss, fevers, chills, night sweats, fatigue   Eyes:  Good vision, no reported pain  ENT:  No sore throat, pain, runny nose, dysphagia  CV:  No pain, palpitations, hypo/hypertension  Resp:  No dyspnea, cough, tachypnea, wheezing  GI:  No pain, No nausea, No vomiting, No diarrhea, No constipation, No weight loss, No fever, No pruritis, No rectal bleeding, No melena, No dysphagia  :  No pain, bleeding, incontinence, nocturia  Muscle:  No pain, weakness  Neuro:  No weakness, tingling, memory problems  Psych:  No fatigue, insomnia, mood problems, depression  Endocrine:  No polyuria, polydypsia, cold/heat intolerance  Heme:  No petechiae, ecchymosis, easy bruisability  Skin:  No rash, tattoos, scars, edema      Vital Signs Last 24 Hrs  T(C): 36.9 (02 Nov 2017 11:45), Max: 36.9 (01 Nov 2017 14:44)  T(F): 98.4 (02 Nov 2017 11:45), Max: 98.5 (01 Nov 2017 14:44)  HR: 71 (02 Nov 2017 11:45) (71 - 90)  BP: 140/61 (02 Nov 2017 11:45) (115/73 - 149/77)  BP(mean): --  RR: 18 (02 Nov 2017 11:45) (18 - 18)  SpO2: 99% (02 Nov 2017 11:45) (96% - 100%)    PHYSICAL EXAM:    Constitutional: NAD  HEENT: EOMI, throat clear  Neck: No LAD, supple  Respiratory: CTA and P  Cardiovascular: S1 and S2, RRR, no M  Gastrointestinal: BS+, soft, NT/ND, neg HSM,  Extremities: No peripheral edema, neg clubbing, cyanosis  Vascular: 2+ peripheral pulses  Neurological: A/O x 2-3, no focal deficits  Psychiatric: Normal mood, normal affect  Skin: No rashes      LABS:                        12.7   10.44 )-----------( 297      ( 02 Nov 2017 06:05 )             42.7     11-02    141  |  102  |  22  ----------------------------<  131<H>  3.8   |  29  |  1.05    Ca    8.8      02 Nov 2017 06:05  Phos  3.6     11-01  Mg     2.0     11-02            RADIOLOGY & ADDITIONAL TESTS:

## 2017-11-02 NOTE — DISCHARGE NOTE ADULT - HOSPITAL COURSE
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 Patient states she has been L sided chest pain which radiates to b/l arm and is associated with diaphoresis since August. She was seen at urgent care in August and was advised to follow up with PMD. Patient went to PMD, but had no further work up and patient went to vacation to [place] for two weeks. Patient was then getting symptoms intermittent for two months. This AM she woke up with severe L sided chest pain which was associated with diaphoresis. Her PMDs office normally checks on her monthly through phone, and when she told them about the chest pain. She was instructed to come to ED further eval.  Denies fever, chills, cough, falls, LOC, SOB, GOMEZ, abdominal pain, nausea, vomiting, calf tenderness, dysuria, constipation, melena, or hematochezia    Hospital Course:     Trop: <..06 --> <.06 , CK: 186 -->, MB: 5.58 -->4.81  EKG: NSR 82 LAD, Flat T in I, AVL, V5, V6  S/P  LE doppler: Bilateral peroneal veins were not visualized, limiting evaluation.   Otherwise, no thrombosis in the remaining deep veins of bilateral lower  extremity.     Seen by  Neuro; pt w/ known brain tumor with left eye blindness.  pt is followed by neuro-oncologist at [place].  Recommend f/u with neuro-oncology as outpatient, c/w current dose of keppra, Chaparrita precautions  11/1 GI: Pts Cjhest pain likely due to Gastroesophageal reflux disease-: protonix bid, carafate qid, maalox prn.   · Diarrhea, unspecified type: intermittent between loose and soft stools, monitor bm frequency and consistency   - bacid tid, check stool culture, ova and parasites, c.diff  - pt's last colonoscopy was about 3 years ago with patient awaiting family to bring in report.   Echo:  Mitral annular calcification, otherwise normal mitral  valve. Minimal mitral regurgitation.  2. Normal left ventricular internal dimensions and wall  thicknesses.  3. Endocardium not well visualized; grossly normal left  ventricular systolic function.  4. The right ventricle is not well visualized; grossly  normal right ventricular systolic function.  Case discussed with attending, Pt is stable for Discharge Home. 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 Patient states she has been L sided chest pain which radiates to b/l arm and is associated with diaphoresis since August. She was seen at urgent care in August and was advised to follow up with PMD. Patient went to PMD, but had no further work up and patient went to vacation to [place] for two weeks. Patient was then getting symptoms intermittent for two months. This AM she woke up with severe L sided chest pain which was associated with diaphoresis. Her PMDs office normally checks on her monthly through phone, and when she told them about the chest pain. She was instructed to come to ED further eval.  Denies fever, chills, cough, falls, LOC, SOB, GOMEZ, abdominal pain, nausea, vomiting, calf tenderness, dysuria, constipation, melena, or hematochezia    Hospital Course:     Trop: <..06 --> <.06 , CK: 186 -->, MB: 5.58 -->4.81  EKG: NSR 82 LAD, Flat T in I, AVL, V5, V6  S/P  LE doppler: Bilateral peroneal veins were not visualized, limiting evaluation.   Otherwise, no thrombosis in the remaining deep veins of bilateral lower  extremity.     Seen by  Neuro; pt w/ known brain tumor with left eye blindness.  pt is followed by neuro-oncologist at [place].  Recommend f/u with neuro-oncology as outpatient, c/w current dose of keppra, Chaparrita precautions  11/1 GI: Pts Cjhest pain likely due to Gastroesophageal reflux disease-: protonix bid, carafate qid, maalox prn.   · Diarrhea, unspecified type: intermittent between loose and soft stools, monitor bm frequency and consistency   - bacid tid, check stool culture, ova and parasites, c.diff  - pt's last colonoscopy was about 3 years ago   Echo:  Mitral annular calcification, otherwise normal mitral  valve. Minimal mitral regurgitation.  2. Normal left ventricular internal dimensions and wall  thicknesses.  3. Endocardium not well visualized; grossly normal left  ventricular systolic function.  4. The right ventricle is not well visualized; grossly  normal right ventricular systolic function.  Case discussed with attending, Pt is stable for Discharge Home. She will Follow up with GI as outpatient.

## 2017-11-02 NOTE — PROGRESS NOTE ADULT - PROBLEM SELECTOR PLAN 5
- stable, A&Ox3 at baseline, c/w current management  - f/u with me in the office after discharge at [address] [place] [address] - please call [phone] to schedule an appointment.

## 2017-11-02 NOTE — DISCHARGE NOTE ADULT - PROVIDER TOKENS
TOKEN:'[id number]',TOKEN:'[id number]',FREE:[LAST:[Dr. Elizalde],PHONE:[(   )    -],FAX:[(   )    -]]

## 2017-11-02 NOTE — PROGRESS NOTE ADULT - PROBLEM SELECTOR PLAN 3
- intermittent   - monitor bm frequency and consistency   - bacid tid  - stool culture pending  - ova and parasites pending  - check c.diff if continues
- ambulates w/ walker at baseline

## 2017-11-02 NOTE — PROGRESS NOTE ADULT - PROBLEM SELECTOR PLAN 4
- she is followed by neuro-oncology at [facility]  - neurology following; rec to continue keppra for h/o seizures and outpt fu at [facility] post discharge
- f/u with neuro at [facility] on Nov 7th as scheduled

## 2017-11-02 NOTE — PROGRESS NOTE ADULT - SUBJECTIVE AND OBJECTIVE BOX
LAURENCE KEY 77y Female    INTERVAL HPI/OVERNIGHT EVENTS: Patient seen and examined at bedside.       REVIEW OF SYSTEMS:   Source: [ ] Patient   [ ] Family  [ ] Team    Pain                     [ ] None  [ ] Mild  [ ] Moderate  [ ] Severe  Dyspnea             [ ] None  [ ] Mild  [ ] Moderate  [ ] Severe  Anxiety               [ ] None  [ ] Mild  [ ] Moderate  [ ] Severe  Fatigue               [ ] None  [ ] Mild  [ ] Moderate  [ ] Severe  Nausea               [ ] None  [ ] Mild  [ ] Moderate  [ ] Severe  Appetite Loss    [ ] None  [ ] Mild  [ ] Moderate  [ ] Severe  Constipation     [ ] None  [ ] Mild  [ ] Moderate  [ ] Severe    [ ] All other review of systems reviewed and negative unless noted above   [ ] Unable to obtain due to poor mentation           Allergies    aspirin (Nausea; Other)  D.A. Chewable (Other)  erythromycin (Rash)  Naprosyn (Other)  Talwin Compound (Urticaria; Rash)  Talwin Lactate (Other)    Intolerances      MEDICATIONS  (STANDING):  amLODIPine   Tablet 5 milliGRAM(s) Oral daily  dextrose 5%. 1000 milliLiter(s) (50 mL/Hr) IV Continuous <Continuous>  dextrose 50% Injectable 12.5 Gram(s) IV Push once  dextrose 50% Injectable 25 Gram(s) IV Push once  dextrose 50% Injectable 25 Gram(s) IV Push once  DULoxetine 60 milliGRAM(s) Oral daily  enoxaparin Injectable 40 milliGRAM(s) SubCutaneous daily  gabapentin 200 milliGRAM(s) Oral two times a day  insulin lispro (HumaLOG) corrective regimen sliding scale   SubCutaneous three times a day before meals  insulin lispro (HumaLOG) corrective regimen sliding scale   SubCutaneous at bedtime  lactobacillus acidophilus 1 Tablet(s) Oral three times a day with meals  levETIRAcetam 250 milliGRAM(s) Oral at bedtime  levETIRAcetam 750 milliGRAM(s) Oral two times a day  levothyroxine 50 MICROGram(s) Oral daily  metoprolol     tartrate 12.5 milliGRAM(s) Oral two times a day  pantoprazole    Tablet 40 milliGRAM(s) Oral two times a day before meals  simvastatin 20 milliGRAM(s) Oral at bedtime  sodium chloride 0.9% lock flush 3 milliLiter(s) IV Push every 8 hours  sucralfate 1 Gram(s) Oral four times a day    MEDICATIONS  (PRN):  aluminum hydroxide/magnesium hydroxide/simethicone Suspension 30 milliLiter(s) Oral every 4 hours PRN Dyspepsia  dextrose Gel 1 Dose(s) Oral once PRN Blood Glucose LESS THAN 70 milliGRAM(s)/deciliter  glucagon  Injectable 1 milliGRAM(s) IntraMuscular once PRN Glucose LESS THAN 70 milligrams/deciliter      Physical Exam:  Vital Signs Last 24 Hrs  T(C): 36.9 (02 Nov 2017 11:45), Max: 36.9 (01 Nov 2017 14:44)  T(F): 98.4 (02 Nov 2017 11:45), Max: 98.5 (01 Nov 2017 14:44)  HR: 71 (02 Nov 2017 11:45) (71 - 90)  BP: 140/61 (02 Nov 2017 11:45) (115/73 - 149/77)  BP(mean): --  RR: 18 (02 Nov 2017 11:45) (18 - 18)  SpO2: 99% (02 Nov 2017 11:45) (96% - 100%)    Karnofsky Performance Score/Palliative Performance Status Version 2:    %    PHYSICAL EXAM:    GENERAL:  NAD           [ ] Alert     [ ] lethargic   [ ] Agitated   [ ] Cachexia   HEENT: NCAT, ESSENCE, EOMI      [ ] Moist Oral Mucosa      [ ] Dry Oral Mucosa    [ ] ET Tube    [ ] Trach     NECK: Supple, no JVD  BREASTS: deferred  BACK: No CVA tenderness, no spinal tenderness   [  ] Kyphosis   [  ] Scoliosis  RESPIRATORY: CTAB, no accessory muscle use      [ ] Tachypnea   [ ] Rhonchi  [ ] Crackles [ ] Wheezing   [ ] Audible excessive secretions   CARDIOVASCULAR: Regular S1S2                              [ ]  Murmur   [ ] Rub    [ ] Gallop  GI: +BS, soft, NT, ND, no guarding, no rebound tenderness         [ ] PEG  [ ] NGT   :  [ ] esposito  RECTAL: deferred  EXTREMITIES/MSK: FROM, non-tender, no joint swelling   VASCULAR:    [ ]  Edema        [ ] b/l dp pulses palpaple    [ ] Clubbing      [ ] Cyanosis  NEURO: A&Ox     [ ] focal weakness  [ ] facial asymmetry         [ ] Cognitive Impairment  SKIN: [ ] Rash      [ ] Ulcers   LYMPH: no LAD  PSYCH: calm, cooperative, pleasant              LABS:                        12.7   10.44 )-----------( 297      ( 02 Nov 2017 06:05 )             42.7     11-02    141  |  102  |  22  ----------------------------<  131<H>  3.8   |  29  |  1.05    Ca    8.8      02 Nov 2017 06:05  Phos  3.6     11-01  Mg     2.0     11-02          I&O's Summary      RADIOLOGY & ADDITIONAL STUDIES:  Reviewed in EMR   [ ] Personally reviewed     Oral Intake Ability  [ ] Unable/mouth care only  [ ] Minimal   [ ] Moderate   [ ] Full capacity   [ ] Protein Calorie Malnutrition [ ] Mild  [ ] Moderate  [ ] Severe   [ ] Shock  [ ] Septic  [ ] Hypovolemic  [ ] Neurogenic   [ ] Cardiogenic  [ ] Vasopressors x    ADVANCE DIRECTIVES:    DNR: [ ] YES [ ] NO       DNI:  [ ] YES [ ] NO           minutes spent on ACP discussion including:   [ ] Goals of care		[ ] Resuscitation status        [] Prognosis		[] Hospice     [] Discharge planning	   [] Symptom management  [ ] Emotional support	[ ] Bereavement                    [] Care coordination with other disciplines    Family-Team meeting              Start time:		   End time:	         	Total Time:  Summary of Discussion:     CASE/PLAN DISCUSSED WITH:    [ ] PATIENT [ ] FAMILY [ ] PRIMARY TEAM  [ ]  [ ] SW  [ ] OTHER:   [ ] Primary team and consultant notes reviewed     __ Minutes spend on total encounter: more than 50% of the visit was spent counseling and/or coordinating care    Thank you for allowing me to participate in the care of our patient.  Please contact me anytime if any questions/concerns.  Alysia Caballero MD  [facility]  Division of Geriatrics and Palliative Medicine  [phone]  [email or web address] LAURENCE KEY 77y Female    INTERVAL HPI/OVERNIGHT EVENTS: Patient seen and examined at bedside this morning.  No acute overnight events. Denies any complaints today except still feels a "pressure" in the left chest - intermittently, approx 1-2/10 intensity, non-radiating, no associated symptoms, no alleviating or exacerbating factors, self-limited, not associated w/ activity or food/fluid intake. Two BMs yesterday per patient - loose/watery.  None this morning. Denies abdominal pain, fever, chills, sob, nausea, vomiting, other complaints.        REVIEW OF SYSTEMS:   Source: [x ] Patient   [ ] Family  [ x] Team    Pain                     [ ] None  [x ] Mild  [ ] Moderate  [ ] Severe  Dyspnea             [x ] None  [ ] Mild  [ ] Moderate  [ ] Severe  Anxiety               [ x] None  [ ] Mild  [ ] Moderate  [ ] Severe  Fatigue               [ x] None  [ ] Mild  [ ] Moderate  [ ] Severe  Nausea               [ x] None  [ ] Mild  [ ] Moderate  [ ] Severe  Appetite Loss    [x] None  [ ] Mild  [ ] Moderate  [ ] Severe  Constipation     [x] None  [ ] Mild  [ ] Moderate  [ ] Severe    [x ] All other review of systems reviewed and negative unless noted above   [ ] Unable to obtain due to poor mentation           Allergies    aspirin (Nausea; Other)  D.A. Chewable (Other)  erythromycin (Rash)  Naprosyn (Other)  Talwin Compound (Urticaria; Rash)  Talwin Lactate (Other)    Intolerances      MEDICATIONS  (STANDING):  amLODIPine   Tablet 5 milliGRAM(s) Oral daily  dextrose 5%. 1000 milliLiter(s) (50 mL/Hr) IV Continuous <Continuous>  dextrose 50% Injectable 12.5 Gram(s) IV Push once  dextrose 50% Injectable 25 Gram(s) IV Push once  dextrose 50% Injectable 25 Gram(s) IV Push once  DULoxetine 60 milliGRAM(s) Oral daily  enoxaparin Injectable 40 milliGRAM(s) SubCutaneous daily  gabapentin 200 milliGRAM(s) Oral two times a day  insulin lispro (HumaLOG) corrective regimen sliding scale   SubCutaneous three times a day before meals  insulin lispro (HumaLOG) corrective regimen sliding scale   SubCutaneous at bedtime  lactobacillus acidophilus 1 Tablet(s) Oral three times a day with meals  levETIRAcetam 250 milliGRAM(s) Oral at bedtime  levETIRAcetam 750 milliGRAM(s) Oral two times a day  levothyroxine 50 MICROGram(s) Oral daily  metoprolol     tartrate 12.5 milliGRAM(s) Oral two times a day  pantoprazole    Tablet 40 milliGRAM(s) Oral two times a day before meals  simvastatin 20 milliGRAM(s) Oral at bedtime  sodium chloride 0.9% lock flush 3 milliLiter(s) IV Push every 8 hours  sucralfate 1 Gram(s) Oral four times a day    MEDICATIONS  (PRN):  aluminum hydroxide/magnesium hydroxide/simethicone Suspension 30 milliLiter(s) Oral every 4 hours PRN Dyspepsia  dextrose Gel 1 Dose(s) Oral once PRN Blood Glucose LESS THAN 70 milliGRAM(s)/deciliter  glucagon  Injectable 1 milliGRAM(s) IntraMuscular once PRN Glucose LESS THAN 70 milligrams/deciliter      Physical Exam:  Vital Signs Last 24 Hrs  T(C): 36.9 (02 Nov 2017 11:45), Max: 36.9 (01 Nov 2017 14:44)  T(F): 98.4 (02 Nov 2017 11:45), Max: 98.5 (01 Nov 2017 14:44)  HR: 71 (02 Nov 2017 11:45) (71 - 90)  BP: 140/61 (02 Nov 2017 11:45) (115/73 - 149/77)  BP(mean): --  RR: 18 (02 Nov 2017 11:45) (18 - 18)  SpO2: 99% (02 Nov 2017 11:45) (96% - 100%)    Karnofsky Performance Score/Palliative Performance Status Version 2: 70   %    PHYSICAL EXAM:    GENERAL:  NAD           [x ] Alert     [ ] lethargic   [ ] Agitated   [ ] Cachexia   HEENT: L eye proptosis, R eye EOMI      [ x] Moist Oral Mucosa      [ ] Dry Oral Mucosa    [ ] ET Tube    [ ] Trach     NECK: Supple, no JVD  BREASTS: deferred   BACK: No CVA tenderness, no spinal tenderness   [  ] Kyphosis   [  ] Scoliosis  RESPIRATORY: CTAB, no accessory muscle use      [ ] Tachypnea   [ ] Rhonchi  [ ] Crackles [ ] Wheezing   [ ] Audible excessive secretions   CARDIOVASCULAR: Regular S1S2                              [ ]  Murmur   [ ] Rub    [ ] Gallop  GI: obese, +BS, soft, NT, ND, no guarding, no rebound tenderness         [ ] PEG  [ ] NGT   :  [ ] esposito  RECTAL: deferred  EXTREMITIES/MSK:  non-tender, no joint swelling   VASCULAR:  L foot drop   [ ]  Edema      [ x] b/l dp pulses palpaple    [ ] Clubbing      [ ] Cyanosis  NEURO: A&Ox3     [x ] focal weakness - L extremities 4/5   [x ] facial asymmetry         [x ] Cognitive Impairment  SKIN: [ ] Rash      [ ] Ulcers   LYMPH: no LAD  PSYCH: calm, cooperative, pleasant         LABS:                        12.7   10.44 )-----------( 297      ( 02 Nov 2017 06:05 )             42.7     11-02    141  |  102  |  22  ----------------------------<  131<H>  3.8   |  29  |  1.05    Ca    8.8      02 Nov 2017 06:05  Phos  3.6     11-01  Mg     2.0     11-02      RADIOLOGY & ADDITIONAL STUDIES:  TTE reviewed in EMR   [ ] Personally reviewed     Oral Intake Ability  [ ] Unable/mouth care only  [ ] Minimal   [ ] Moderate   [x Full capacity     ADVANCE DIRECTIVES:    DNR: [ ] YES [ ] NO       DNI:  [ ] YES [ ] NO           CASE/PLAN DISCUSSED WITH:    [ x] PATIENT [ ] FAMILY [x ] PRIMARY TEAM - VIOLA Lagos  [ ]  [ ] VINNY  [ ] OTHER:   [x ] Primary team and consultant notes reviewed

## 2017-11-02 NOTE — DISCHARGE NOTE ADULT - CARE PLAN
Principal Discharge DX:	Atypical chest pain  Goal:	Likely Due to GERD  Instructions for follow-up, activity and diet:	Follow up with your PMD or Dr. Lara in 1-2 weeks  Secondary Diagnosis:	Pituitary tumor  Instructions for follow-up, activity and diet:	Follow up with Your Neurologist in 1-2 weeks  Secondary Diagnosis:	Diarrhea, unspecified type  Instructions for follow-up, activity and diet:	Follow up with Dr. Lira  Secondary Diagnosis:	Diabetes mellitus, type 2  Secondary Diagnosis:	HLD (Hyperlipidemia)  Secondary Diagnosis:	Hypertension

## 2017-11-02 NOTE — PROGRESS NOTE ADULT - PROBLEM SELECTOR PLAN 1
- cardiology workup in progress  - TTE noted with minimal mr
- cardiac workup so far not-revealing, per primary team/cards  - Possibly GI related, h/o GERD, evaluted by GI - workup in progress  - Started on PPI , Carafate and Maalox per GI - monitor for improvement

## 2017-11-02 NOTE — DISCHARGE NOTE ADULT - PLAN OF CARE
Likely Due to GERD Follow up with your PMD or Dr. Lara in 1-2 weeks Follow up with Your Neurologist in 1-2 weeks Follow up with Dr. Lira

## 2017-11-02 NOTE — PROGRESS NOTE ADULT - PROBLEM SELECTOR PLAN 2
- resolved   - protonix bid  - carafate qid  - maalox prn
- Evaluted by GI - workup in progress  - Started on Bacid per GI, monitor BMs, stool studies ordered

## 2017-11-02 NOTE — DISCHARGE NOTE ADULT - PATIENT PORTAL LINK FT
“You can access the [facility] Patient Portal, offered by [facility], by registering with the following website: [email or web address]”

## 2017-11-02 NOTE — DISCHARGE NOTE ADULT - MEDICATION SUMMARY - MEDICATIONS TO TAKE
I will START or STAY ON the medications listed below when I get home from the hospital:    Keppra 750 mg oral tablet  -- 1 tab(s) by mouth 2 times a day  -- Indication: For Seizure precautions    gabapentin 100 mg oral capsule  -- 2 cap(s) by mouth 2 times a day  -- Indication: For Pain    Keppra 250 mg oral tablet  -- 1 tab(s) by mouth once a day (at bedtime)  -- Indication: For Seizure precautions    DULoxetine 60 mg oral delayed release capsule  -- 1 cap(s) by mouth once a day  -- Indication: For mood    Victoza 18 mg/3 mL subcutaneous solution  -- Indication: For Diabetes mellitus, type 2    metformin 500 mg oral tablet  -- 1 cap(s) by mouth 3 times a day  -- Indication: For Diabetes mellitus, type 2    simvastatin 20 mg oral tablet  -- 1 tab(s) by mouth once a day (at bedtime)  -- Indication: For Cholesterol    metoprolol tartrate 25 mg oral tablet  -- 0.5 tab(s) by mouth 2 times a day  -- Indication: For HTN    amLODIPine 5 mg oral tablet  -- 1 tab(s) by mouth once a day  -- Indication: For HTN    sucralfate 1 g oral tablet  -- 1 tab(s) by mouth 4 times a day  -- Indication: For GERD    lactobacillus acidophilus oral capsule  -- 1 tab(s) by mouth 3 times a day (with meals)  -- Indication: For GERD    pantoprazole 40 mg oral delayed release tablet  -- 1 tab(s) by mouth 2 times a day (before meals)  -- Indication: For GERD    levothyroxine 50 mcg (0.05 mg) oral capsule  -- 1 cap(s) by mouth once a day  -- Indication: For Hypothyroidism

## 2017-11-02 NOTE — PROGRESS NOTE ADULT - ASSESSMENT
EKG - NSR     A/P     1) Chest pain - atypical,  2d echo shows grossly normal LV, CE and EKG negative, no further inpatient work up needed    2) H/O pituitary tumor - neuro consult appreciated out patient follow up    3) Chronic diarrhea - GI consult, send stool studies    4) DVT - sc lovenox
EKG - NSR     A/P     1) Chest pain - atypical,  2d echo shows grossly normal LV, CE and EKG negative, no further inpatient work up needed    2) H/O pituitary tumor - neuro consult appreciated out patient follow up    3) Chronic diarrhea - GI consult, send stool studies    4) DVT - sc lovenox
EKG - NSR     A/P     1) Chest pain - atypical, will get 2d echo to access LV, CE and EKG negative    2) H/O pituitary tumor - neuro consult appreciated out patient follow up    3) Chronic diarrhea - GI consult, send stool studies    4) DVT - sc lovenox
EKG - NSR     A/P     1) Chest pain - atypical, will get 2d echo to access LV, CE and EKG negative    2) H/O pituitary tumor - neuro consult appreciated out patient follow up    3) Chronic diarrhea - GI consult, send stool studies    4) DVT - sc lovenox
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

## 2017-11-02 NOTE — DISCHARGE NOTE ADULT - CARE PROVIDER_API CALL
Tomy Lira (), Gastroenterology; Internal Medicine  [address] [place] [address]  Phone: [phone]  Fax: [phone]    Anjum Connolly (MD), Cardiovascular Disease; Internal Medicine; Nuclear Cardiology  [address] [place] [address]  Phone: [phone]  Fax: [phone]    Dr. Elizalde,   Phone: (   )    -  Fax: (   )    -

## 2017-11-02 NOTE — PROGRESS NOTE ADULT - ATTENDING COMMENTS
Thank you for allowing me to participate in the care of our patient.  Please contact me anytime if any questions/concerns.  Alysia Caballero MD  [facility]  Division of Geriatrics and Palliative Medicine  [phone]  [email or web address]

## 2017-11-02 NOTE — PROGRESS NOTE ADULT - SUBJECTIVE AND OBJECTIVE BOX
Anjum Connolly MD  Interventional Cardiology  [place] Office : [phone] [place] [id number]  Tel:   [place] Office : [phone] [facility] [place] [address]  Tel: [phone]  Cell : [phone]    Subjective : Pt lying in bed comfortable, not in distress, denies any chest pain or SOB  	  MEDICATIONS:  amLODIPine   Tablet 5 milliGRAM(s) Oral daily  enoxaparin Injectable 40 milliGRAM(s) SubCutaneous daily  metoprolol     tartrate 12.5 milliGRAM(s) Oral two times a day        DULoxetine 60 milliGRAM(s) Oral daily  gabapentin 200 milliGRAM(s) Oral two times a day  levETIRAcetam 250 milliGRAM(s) Oral at bedtime  levETIRAcetam 750 milliGRAM(s) Oral two times a day    aluminum hydroxide/magnesium hydroxide/simethicone Suspension 30 milliLiter(s) Oral every 4 hours PRN  pantoprazole    Tablet 40 milliGRAM(s) Oral two times a day before meals  sucralfate 1 Gram(s) Oral four times a day    dextrose 50% Injectable 12.5 Gram(s) IV Push once  dextrose 50% Injectable 25 Gram(s) IV Push once  dextrose 50% Injectable 25 Gram(s) IV Push once  dextrose Gel 1 Dose(s) Oral once PRN  glucagon  Injectable 1 milliGRAM(s) IntraMuscular once PRN  insulin lispro (HumaLOG) corrective regimen sliding scale   SubCutaneous three times a day before meals  insulin lispro (HumaLOG) corrective regimen sliding scale   SubCutaneous at bedtime  levothyroxine 50 MICROGram(s) Oral daily  simvastatin 20 milliGRAM(s) Oral at bedtime    dextrose 5%. 1000 milliLiter(s) IV Continuous <Continuous>      PHYSICAL EXAM:  T(C): 36.6 (11-02-17 @ 06:29), Max: 36.9 (11-01-17 @ 14:44)  HR: 71 (11-02-17 @ 06:29) (71 - 90)  BP: 138/75 (11-02-17 @ 06:29) (115/73 - 149/77)  RR: 18 (11-02-17 @ 06:29) (18 - 18)  SpO2: 100% (11-02-17 @ 06:29) (96% - 100%)  Wt(kg): --  I&O's Summary        Appearance: Normal	  HEENT:   left eye proptosis  Cardiovascular: Normal S1 S2, No JVD, No murmurs, No edema  Respiratory: Lungs clear to auscultation	  Gastrointestinal:  Soft, Non-tender, + BS	  Extremities: Normal range of motion, No clubbing, cyanosis or edema                                    12.7   10.44 )-----------( 297      ( 02 Nov 2017 06:05 )             42.7     11-02    141  |  102  |  22  ----------------------------<  131<H>  3.8   |  29  |  1.05    Ca    8.8      02 Nov 2017 06:05  Phos  3.6     11-01  Mg     2.0     11-02      proBNP:   Lipid Profile:   HgA1c:   TSH:

## 2017-11-02 NOTE — PROGRESS NOTE ADULT - SUBJECTIVE AND OBJECTIVE BOX
Subjective : Pt lying in bed comfortable, not in distress, denies any chest pain or SOB  	  MEDICATIONS:  amLODIPine   Tablet 5 milliGRAM(s) Oral daily  enoxaparin Injectable 40 milliGRAM(s) SubCutaneous daily  metoprolol     tartrate 12.5 milliGRAM(s) Oral two times a day        DULoxetine 60 milliGRAM(s) Oral daily  gabapentin 200 milliGRAM(s) Oral two times a day  levETIRAcetam 250 milliGRAM(s) Oral at bedtime  levETIRAcetam 750 milliGRAM(s) Oral two times a day    aluminum hydroxide/magnesium hydroxide/simethicone Suspension 30 milliLiter(s) Oral every 4 hours PRN  pantoprazole    Tablet 40 milliGRAM(s) Oral two times a day before meals  sucralfate 1 Gram(s) Oral four times a day    dextrose 50% Injectable 12.5 Gram(s) IV Push once  dextrose 50% Injectable 25 Gram(s) IV Push once  dextrose 50% Injectable 25 Gram(s) IV Push once  dextrose Gel 1 Dose(s) Oral once PRN  glucagon  Injectable 1 milliGRAM(s) IntraMuscular once PRN  insulin lispro (HumaLOG) corrective regimen sliding scale   SubCutaneous three times a day before meals  insulin lispro (HumaLOG) corrective regimen sliding scale   SubCutaneous at bedtime  levothyroxine 50 MICROGram(s) Oral daily  simvastatin 20 milliGRAM(s) Oral at bedtime    dextrose 5%. 1000 milliLiter(s) IV Continuous <Continuous>      PHYSICAL EXAM:  T(C): 36.6 (11-02-17 @ 06:29), Max: 36.9 (11-01-17 @ 14:44)  HR: 71 (11-02-17 @ 06:29) (71 - 90)  BP: 138/75 (11-02-17 @ 06:29) (115/73 - 149/77)  RR: 18 (11-02-17 @ 06:29) (18 - 18)  SpO2: 100% (11-02-17 @ 06:29) (96% - 100%)  Wt(kg): --  I&O's Summary        Appearance: Normal	  HEENT:   left eye proptosis  Cardiovascular: Normal S1 S2, No JVD, No murmurs, No edema  Respiratory: Lungs clear to auscultation	  Gastrointestinal:  Soft, Non-tender, + BS	  Extremities: Normal range of motion, No clubbing, cyanosis or edema                                    12.7   10.44 )-----------( 297      ( 02 Nov 2017 06:05 )             42.7     11-02    141  |  102  |  22  ----------------------------<  131<H>  3.8   |  29  |  1.05    Ca    8.8      02 Nov 2017 06:05  Phos  3.6     11-01  Mg     2.0     11-02      proBNP:   Lipid Profile:   HgA1c:   TSH:

## 2017-11-02 NOTE — DISCHARGE NOTE ADULT - SECONDARY DIAGNOSIS.
Pituitary tumor Diarrhea, unspecified type Diabetes mellitus, type 2 HLD (Hyperlipidemia) Hypertension

## 2017-11-10 ENCOUNTER — APPOINTMENT (OUTPATIENT)
Dept: GERIATRICS | Facility: CLINIC | Age: 77
End: 2017-11-10
Payer: MEDICARE

## 2017-11-10 VITALS
HEIGHT: 61 IN | RESPIRATION RATE: 15 BRPM | TEMPERATURE: 98 F | HEART RATE: 69 BPM | BODY MASS INDEX: 32.31 KG/M2 | DIASTOLIC BLOOD PRESSURE: 60 MMHG | OXYGEN SATURATION: 98 % | SYSTOLIC BLOOD PRESSURE: 100 MMHG | WEIGHT: 171.13 LBS

## 2017-11-10 DIAGNOSIS — Z86.39 PERSONAL HISTORY OF OTHER ENDOCRINE, NUTRITIONAL AND METABOLIC DISEASE: ICD-10-CM

## 2017-11-10 PROCEDURE — 99497 ADVNCD CARE PLAN 30 MIN: CPT | Mod: 25

## 2017-11-10 PROCEDURE — 99495 TRANSJ CARE MGMT MOD F2F 14D: CPT

## 2017-12-01 ENCOUNTER — APPOINTMENT (OUTPATIENT)
Dept: CARDIOLOGY | Facility: CLINIC | Age: 77
End: 2017-12-01
Payer: MEDICARE

## 2017-12-01 ENCOUNTER — NON-APPOINTMENT (OUTPATIENT)
Age: 77
End: 2017-12-01

## 2017-12-01 VITALS
SYSTOLIC BLOOD PRESSURE: 132 MMHG | HEART RATE: 67 BPM | HEIGHT: 61 IN | DIASTOLIC BLOOD PRESSURE: 81 MMHG | BODY MASS INDEX: 33.23 KG/M2 | OXYGEN SATURATION: 97 % | WEIGHT: 176 LBS

## 2017-12-01 PROCEDURE — 93000 ELECTROCARDIOGRAM COMPLETE: CPT

## 2017-12-01 PROCEDURE — 99205 OFFICE O/P NEW HI 60 MIN: CPT

## 2017-12-04 ENCOUNTER — RX RENEWAL (OUTPATIENT)
Age: 77
End: 2017-12-04

## 2017-12-12 ENCOUNTER — APPOINTMENT (OUTPATIENT)
Dept: CV DIAGNOSTICS | Facility: HOSPITAL | Age: 77
End: 2017-12-12

## 2017-12-12 ENCOUNTER — OUTPATIENT (OUTPATIENT)
Dept: OUTPATIENT SERVICES | Facility: HOSPITAL | Age: 77
LOS: 1 days | End: 2017-12-12
Payer: MEDICARE

## 2017-12-12 DIAGNOSIS — R07.89 OTHER CHEST PAIN: ICD-10-CM

## 2017-12-12 DIAGNOSIS — S06.5X9A TRAUMATIC SUBDURAL HEMORRHAGE WITH LOSS OF CONSCIOUSNESS OF UNSPECIFIED DURATION, INITIAL ENCOUNTER: Chronic | ICD-10-CM

## 2017-12-13 ENCOUNTER — APPOINTMENT (OUTPATIENT)
Dept: GASTROENTEROLOGY | Facility: CLINIC | Age: 77
End: 2017-12-13
Payer: MEDICARE

## 2017-12-13 VITALS
HEART RATE: 67 BPM | WEIGHT: 166 LBS | BODY MASS INDEX: 31.34 KG/M2 | HEIGHT: 61 IN | SYSTOLIC BLOOD PRESSURE: 113 MMHG | DIASTOLIC BLOOD PRESSURE: 69 MMHG

## 2017-12-13 DIAGNOSIS — Z80.0 FAMILY HISTORY OF MALIGNANT NEOPLASM OF DIGESTIVE ORGANS: ICD-10-CM

## 2017-12-13 PROCEDURE — 82274 ASSAY TEST FOR BLOOD FECAL: CPT | Mod: QW

## 2017-12-13 PROCEDURE — 99204 OFFICE O/P NEW MOD 45 MIN: CPT

## 2017-12-14 PROCEDURE — 78452 HT MUSCLE IMAGE SPECT MULT: CPT

## 2017-12-14 PROCEDURE — 78452 HT MUSCLE IMAGE SPECT MULT: CPT | Mod: 26

## 2017-12-14 PROCEDURE — A9505: CPT

## 2017-12-14 PROCEDURE — A9500: CPT

## 2017-12-14 PROCEDURE — 93017 CV STRESS TEST TRACING ONLY: CPT

## 2017-12-14 PROCEDURE — 93016 CV STRESS TEST SUPVJ ONLY: CPT

## 2017-12-14 PROCEDURE — 93018 CV STRESS TEST I&R ONLY: CPT

## 2017-12-22 DIAGNOSIS — M48.02 SPINAL STENOSIS, CERVICAL REGION: ICD-10-CM

## 2017-12-27 ENCOUNTER — APPOINTMENT (OUTPATIENT)
Dept: GERIATRICS | Facility: CLINIC | Age: 77
End: 2017-12-27
Payer: MEDICARE

## 2017-12-27 VITALS — SYSTOLIC BLOOD PRESSURE: 140 MMHG | DIASTOLIC BLOOD PRESSURE: 60 MMHG

## 2017-12-27 VITALS
HEIGHT: 61 IN | DIASTOLIC BLOOD PRESSURE: 60 MMHG | TEMPERATURE: 98.4 F | RESPIRATION RATE: 16 BRPM | HEART RATE: 82 BPM | WEIGHT: 180 LBS | SYSTOLIC BLOOD PRESSURE: 118 MMHG | BODY MASS INDEX: 33.99 KG/M2 | OXYGEN SATURATION: 97 %

## 2017-12-27 DIAGNOSIS — Z86.79 PERSONAL HISTORY OF OTHER DISEASES OF THE CIRCULATORY SYSTEM: ICD-10-CM

## 2017-12-27 PROCEDURE — 99214 OFFICE O/P EST MOD 30 MIN: CPT

## 2018-01-01 ENCOUNTER — OUTPATIENT (OUTPATIENT)
Dept: OUTPATIENT SERVICES | Facility: HOSPITAL | Age: 78
LOS: 1 days | End: 2018-01-01
Payer: MEDICAID

## 2018-01-01 DIAGNOSIS — S06.5X9A TRAUMATIC SUBDURAL HEMORRHAGE WITH LOSS OF CONSCIOUSNESS OF UNSPECIFIED DURATION, INITIAL ENCOUNTER: Chronic | ICD-10-CM

## 2018-01-25 DIAGNOSIS — R69 ILLNESS, UNSPECIFIED: ICD-10-CM

## 2018-01-30 ENCOUNTER — APPOINTMENT (OUTPATIENT)
Dept: GERIATRICS | Facility: CLINIC | Age: 78
End: 2018-01-30
Payer: MEDICARE

## 2018-01-30 VITALS
DIASTOLIC BLOOD PRESSURE: 60 MMHG | BODY MASS INDEX: 33.99 KG/M2 | HEIGHT: 61 IN | SYSTOLIC BLOOD PRESSURE: 110 MMHG | RESPIRATION RATE: 16 BRPM | HEART RATE: 70 BPM | OXYGEN SATURATION: 98 % | WEIGHT: 180 LBS | TEMPERATURE: 98.7 F

## 2018-01-30 DIAGNOSIS — H54.3 UNQUALIFIED VISUAL LOSS, BOTH EYES: ICD-10-CM

## 2018-01-30 PROCEDURE — 99214 OFFICE O/P EST MOD 30 MIN: CPT

## 2018-01-30 RX ORDER — SUCRALFATE 1 G/1
1 TABLET ORAL
Qty: 56 | Refills: 0 | Status: DISCONTINUED | COMMUNITY
Start: 2017-11-02 | End: 2018-01-30

## 2018-01-31 PROBLEM — H54.3 VISION LOSS, BILATERAL: Status: ACTIVE | Noted: 2018-01-31

## 2018-01-31 LAB — HBA1C MFR BLD HPLC: 5.7 %

## 2018-01-31 RX ORDER — LEVETIRACETAM 250 MG/1
250 TABLET, FILM COATED ORAL AT BEDTIME
Refills: 0 | Status: DISCONTINUED | COMMUNITY
Start: 2017-10-23 | End: 2018-01-31

## 2018-02-01 ENCOUNTER — MESSAGE (OUTPATIENT)
Age: 78
End: 2018-02-01

## 2018-02-06 ENCOUNTER — RX RENEWAL (OUTPATIENT)
Age: 78
End: 2018-02-06

## 2018-02-12 LAB
ANION GAP SERPL CALC-SCNC: 15 MMOL/L
BASOPHILS # BLD AUTO: 0.02 K/UL
BASOPHILS NFR BLD AUTO: 0.2 %
BUN SERPL-MCNC: 16 MG/DL
CALCIUM SERPL-MCNC: 9.7 MG/DL
CHLORIDE SERPL-SCNC: 100 MMOL/L
CO2 SERPL-SCNC: 28 MMOL/L
CREAT SERPL-MCNC: 1.01 MG/DL
EOSINOPHIL # BLD AUTO: 0.08 K/UL
EOSINOPHIL NFR BLD AUTO: 0.8 %
GLUCOSE SERPL-MCNC: 114 MG/DL
HCT VFR BLD CALC: 41 %
HGB BLD-MCNC: 12.2 G/DL
IMM GRANULOCYTES NFR BLD AUTO: 0.3 %
LYMPHOCYTES # BLD AUTO: 2.8 K/UL
LYMPHOCYTES NFR BLD AUTO: 29.7 %
MAN DIFF?: NORMAL
MCHC RBC-ENTMCNC: 26.9 PG
MCHC RBC-ENTMCNC: 29.8 GM/DL
MCV RBC AUTO: 90.3 FL
MONOCYTES # BLD AUTO: 0.49 K/UL
MONOCYTES NFR BLD AUTO: 5.2 %
NEUTROPHILS # BLD AUTO: 6 K/UL
NEUTROPHILS NFR BLD AUTO: 63.8 %
PLATELET # BLD AUTO: 301 K/UL
POTASSIUM SERPL-SCNC: 4.2 MMOL/L
RBC # BLD: 4.54 M/UL
RBC # FLD: 16.6 %
SODIUM SERPL-SCNC: 143 MMOL/L
WBC # FLD AUTO: 9.42 K/UL

## 2018-02-22 ENCOUNTER — APPOINTMENT (OUTPATIENT)
Dept: CARDIOLOGY | Facility: CLINIC | Age: 78
End: 2018-02-22

## 2018-02-22 ENCOUNTER — OUTPATIENT (OUTPATIENT)
Dept: OUTPATIENT SERVICES | Facility: HOSPITAL | Age: 78
LOS: 1 days | End: 2018-02-22
Payer: MEDICARE

## 2018-02-22 DIAGNOSIS — S06.5X9A TRAUMATIC SUBDURAL HEMORRHAGE WITH LOSS OF CONSCIOUSNESS OF UNSPECIFIED DURATION, INITIAL ENCOUNTER: Chronic | ICD-10-CM

## 2018-02-22 DIAGNOSIS — R07.9 CHEST PAIN, UNSPECIFIED: ICD-10-CM

## 2018-02-22 PROCEDURE — 75574 CT ANGIO HRT W/3D IMAGE: CPT

## 2018-02-22 PROCEDURE — 75574 CT ANGIO HRT W/3D IMAGE: CPT | Mod: 26

## 2018-02-26 ENCOUNTER — APPOINTMENT (OUTPATIENT)
Dept: GASTROENTEROLOGY | Facility: CLINIC | Age: 78
End: 2018-02-26
Payer: MEDICARE

## 2018-02-26 VITALS
WEIGHT: 180 LBS | BODY MASS INDEX: 33.99 KG/M2 | HEART RATE: 65 BPM | DIASTOLIC BLOOD PRESSURE: 74 MMHG | SYSTOLIC BLOOD PRESSURE: 136 MMHG | HEIGHT: 61 IN

## 2018-02-26 PROCEDURE — 99214 OFFICE O/P EST MOD 30 MIN: CPT

## 2018-02-27 ENCOUNTER — APPOINTMENT (OUTPATIENT)
Dept: OPHTHALMOLOGY | Facility: CLINIC | Age: 78
End: 2018-02-27

## 2018-03-24 ENCOUNTER — RX RENEWAL (OUTPATIENT)
Age: 78
End: 2018-03-24

## 2018-04-12 ENCOUNTER — APPOINTMENT (OUTPATIENT)
Dept: GERIATRICS | Facility: CLINIC | Age: 78
End: 2018-04-12
Payer: MEDICARE

## 2018-04-12 VITALS
TEMPERATURE: 97.7 F | BODY MASS INDEX: 34.17 KG/M2 | WEIGHT: 181 LBS | RESPIRATION RATE: 15 BRPM | SYSTOLIC BLOOD PRESSURE: 120 MMHG | HEIGHT: 61 IN | OXYGEN SATURATION: 96 % | HEART RATE: 66 BPM | DIASTOLIC BLOOD PRESSURE: 80 MMHG

## 2018-04-12 DIAGNOSIS — H40.9 UNSPECIFIED GLAUCOMA: ICD-10-CM

## 2018-04-12 LAB — HBA1C MFR BLD HPLC: 9.1 %

## 2018-04-12 PROCEDURE — 99214 OFFICE O/P EST MOD 30 MIN: CPT

## 2018-04-13 LAB — 25(OH)D3 SERPL-MCNC: 25.4 NG/ML

## 2018-04-20 ENCOUNTER — TRANSCRIPTION ENCOUNTER (OUTPATIENT)
Age: 78
End: 2018-04-20

## 2018-05-02 ENCOUNTER — APPOINTMENT (OUTPATIENT)
Dept: CHRONIC DISEASE MANAGEMENT | Facility: CLINIC | Age: 78
End: 2018-05-02
Payer: MEDICARE

## 2018-05-02 PROCEDURE — 96150: CPT

## 2018-05-03 ENCOUNTER — APPOINTMENT (OUTPATIENT)
Dept: GERIATRICS | Facility: CLINIC | Age: 78
End: 2018-05-03
Payer: MEDICARE

## 2018-05-03 VITALS
OXYGEN SATURATION: 94 % | TEMPERATURE: 97.6 F | HEIGHT: 61 IN | WEIGHT: 173 LBS | SYSTOLIC BLOOD PRESSURE: 130 MMHG | HEART RATE: 75 BPM | DIASTOLIC BLOOD PRESSURE: 80 MMHG | RESPIRATION RATE: 15 BRPM | BODY MASS INDEX: 32.66 KG/M2

## 2018-05-03 PROCEDURE — 99214 OFFICE O/P EST MOD 30 MIN: CPT

## 2018-06-01 PROCEDURE — G9005: CPT

## 2018-06-01 PROCEDURE — G9001: CPT

## 2018-06-06 ENCOUNTER — APPOINTMENT (OUTPATIENT)
Dept: GERIATRICS | Facility: CLINIC | Age: 78
End: 2018-06-06
Payer: MEDICARE

## 2018-06-06 VITALS
SYSTOLIC BLOOD PRESSURE: 132 MMHG | WEIGHT: 176 LBS | BODY MASS INDEX: 33.26 KG/M2 | DIASTOLIC BLOOD PRESSURE: 80 MMHG | HEART RATE: 78 BPM | TEMPERATURE: 98.4 F | OXYGEN SATURATION: 97 %

## 2018-06-06 DIAGNOSIS — R43.0 ANOSMIA: ICD-10-CM

## 2018-06-06 PROCEDURE — 99215 OFFICE O/P EST HI 40 MIN: CPT

## 2018-06-06 RX ORDER — GUAIFENESIN 600 MG/1
600 TABLET, EXTENDED RELEASE ORAL
Qty: 28 | Refills: 0 | Status: DISCONTINUED | COMMUNITY
Start: 2018-04-12 | End: 2018-06-06

## 2018-06-06 RX ORDER — BRINZOLAMIDE 10 MG/ML
1 SUSPENSION/ DROPS OPHTHALMIC
Qty: 10 | Refills: 0 | Status: DISCONTINUED | COMMUNITY
Start: 2018-02-06 | End: 2018-06-06

## 2018-06-20 ENCOUNTER — APPOINTMENT (OUTPATIENT)
Dept: GERIATRICS | Facility: CLINIC | Age: 78
End: 2018-06-20
Payer: MEDICARE

## 2018-06-20 VITALS
DIASTOLIC BLOOD PRESSURE: 70 MMHG | BODY MASS INDEX: 33.04 KG/M2 | HEART RATE: 72 BPM | RESPIRATION RATE: 15 BRPM | SYSTOLIC BLOOD PRESSURE: 120 MMHG | HEIGHT: 61 IN | WEIGHT: 175 LBS | OXYGEN SATURATION: 97 % | TEMPERATURE: 97.7 F

## 2018-06-20 DIAGNOSIS — Z87.898 PERSONAL HISTORY OF OTHER SPECIFIED CONDITIONS: ICD-10-CM

## 2018-06-20 DIAGNOSIS — R07.89 OTHER CHEST PAIN: ICD-10-CM
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
## 2018-06-20 DIAGNOSIS — I11.9 HYPERTENSIVE HEART DISEASE W/OUT HEART FAILURE: ICD-10-CM

## 2018-06-20 DIAGNOSIS — Z86.69 PERSONAL HISTORY OF OTHER DISEASES OF THE NERVOUS SYSTEM AND SENSE ORGANS: ICD-10-CM

## 2018-06-20 DIAGNOSIS — H04.122 DRY EYE SYNDROME OF LEFT LACRIMAL GLAND: ICD-10-CM

## 2018-06-20 DIAGNOSIS — J98.8 OTHER SPECIFIED RESPIRATORY DISORDERS: ICD-10-CM

## 2018-06-20 DIAGNOSIS — G40.109 LOCALIZATION-RELATED (FOCAL) (PARTIAL) SYMPTOMATIC EPILEPSY AND EPILEPTIC SYNDROMES WITH SIMPLE PARTIAL SEIZURES, NOT INTRACTABLE, W/OUT STATUS EPILEPTICUS: ICD-10-CM

## 2018-06-20 DIAGNOSIS — Z23 ENCOUNTER FOR IMMUNIZATION: ICD-10-CM

## 2018-06-20 PROCEDURE — 99214 OFFICE O/P EST MOD 30 MIN: CPT

## 2018-06-27 ENCOUNTER — LABORATORY RESULT (OUTPATIENT)
Age: 78
End: 2018-06-27

## 2018-06-29 ENCOUNTER — NON-APPOINTMENT (OUTPATIENT)
Age: 78
End: 2018-06-29

## 2018-06-29 ENCOUNTER — APPOINTMENT (OUTPATIENT)
Dept: CARDIOLOGY | Facility: CLINIC | Age: 78
End: 2018-06-29
Payer: MEDICARE

## 2018-06-29 VITALS — HEART RATE: 66 BPM | SYSTOLIC BLOOD PRESSURE: 131 MMHG | DIASTOLIC BLOOD PRESSURE: 78 MMHG

## 2018-06-29 DIAGNOSIS — R94.39 ABNORMAL RESULT OF OTHER CARDIOVASCULAR FUNCTION STUDY: ICD-10-CM

## 2018-06-29 LAB
ALBUMIN SERPL ELPH-MCNC: 3.7 G/DL
ALP BLD-CCNC: 47 U/L
ALT SERPL-CCNC: 99 U/L
ANION GAP SERPL CALC-SCNC: 13 MMOL/L
AST SERPL-CCNC: 26 U/L
BASOPHILS # BLD AUTO: 0.01 K/UL
BASOPHILS NFR BLD AUTO: 0.1 %
BILIRUB SERPL-MCNC: 0.2 MG/DL
BUN SERPL-MCNC: 23 MG/DL
CALCIUM SERPL-MCNC: 9.6 MG/DL
CHLORIDE SERPL-SCNC: 101 MMOL/L
CO2 SERPL-SCNC: 25 MMOL/L
CREAT SERPL-MCNC: 0.92 MG/DL
EOSINOPHIL # BLD AUTO: 0.02 K/UL
EOSINOPHIL NFR BLD AUTO: 0.2 %
GLUCOSE SERPL-MCNC: 100 MG/DL
HCT VFR BLD CALC: 38.2 %
HGB BLD-MCNC: 11.8 G/DL
IMM GRANULOCYTES NFR BLD AUTO: 0.7 %
INR PPP: 0.91 RATIO
LYMPHOCYTES # BLD AUTO: 1.8 K/UL
LYMPHOCYTES NFR BLD AUTO: 13.8 %
MAN DIFF?: NORMAL
MCHC RBC-ENTMCNC: 27.6 PG
MCHC RBC-ENTMCNC: 30.9 GM/DL
MCV RBC AUTO: 89.3 FL
MONOCYTES # BLD AUTO: 0.89 K/UL
MONOCYTES NFR BLD AUTO: 6.8 %
NEUTROPHILS # BLD AUTO: 10.21 K/UL
NEUTROPHILS NFR BLD AUTO: 78.4 %
PLATELET # BLD AUTO: 291 K/UL
POTASSIUM SERPL-SCNC: 4.7 MMOL/L
PROT SERPL-MCNC: 6.9 G/DL
PT BLD: 10.3 SEC
RBC # BLD: 4.28 M/UL
RBC # FLD: 16.2 %
SODIUM SERPL-SCNC: 139 MMOL/L
WBC # FLD AUTO: 13.02 K/UL

## 2018-06-29 PROCEDURE — 93000 ELECTROCARDIOGRAM COMPLETE: CPT

## 2018-06-29 PROCEDURE — 99214 OFFICE O/P EST MOD 30 MIN: CPT

## 2018-07-01 ENCOUNTER — OUTPATIENT (OUTPATIENT)
Dept: OUTPATIENT SERVICES | Facility: HOSPITAL | Age: 78
LOS: 1 days | End: 2018-07-01
Payer: MEDICARE

## 2018-07-01 DIAGNOSIS — S06.5X9A TRAUMATIC SUBDURAL HEMORRHAGE WITH LOSS OF CONSCIOUSNESS OF UNSPECIFIED DURATION, INITIAL ENCOUNTER: Chronic | ICD-10-CM

## 2018-07-02 LAB
HAV IGM SER QL: NONREACTIVE
HBV CORE IGM SER QL: NONREACTIVE
HBV SURFACE AG SER QL: NONREACTIVE
HCV AB SER QL: NONREACTIVE
HCV S/CO RATIO: 0.13 S/CO

## 2018-07-06 PROBLEM — R94.39 ABNORMAL STRESS TEST: Status: RESOLVED | Noted: 2018-01-19 | Resolved: 2018-07-06

## 2018-07-08 LAB
ALBUMIN SERPL ELPH-MCNC: 4 G/DL
ALP BLD-CCNC: 51 U/L
ALT SERPL-CCNC: 64 U/L
ANION GAP SERPL CALC-SCNC: 14 MMOL/L
APTT BLD: 28.6 SEC
AST SERPL-CCNC: 17 U/L
BASOPHILS # BLD AUTO: 0.01 K/UL
BASOPHILS NFR BLD AUTO: 0.1 %
BILIRUB SERPL-MCNC: 0.4 MG/DL
BUN SERPL-MCNC: 20 MG/DL
CALCIUM SERPL-MCNC: 9.5 MG/DL
CHLORIDE SERPL-SCNC: 101 MMOL/L
CO2 SERPL-SCNC: 27 MMOL/L
CREAT SERPL-MCNC: 1.04 MG/DL
EOSINOPHIL # BLD AUTO: 0.03 K/UL
EOSINOPHIL NFR BLD AUTO: 0.3 %
GLUCOSE SERPL-MCNC: 138 MG/DL
HCT VFR BLD CALC: 39.7 %
HGB BLD-MCNC: 12.3 G/DL
IMM GRANULOCYTES NFR BLD AUTO: 0.4 %
LYMPHOCYTES # BLD AUTO: 1.75 K/UL
LYMPHOCYTES NFR BLD AUTO: 17.2 %
MAN DIFF?: NORMAL
MCHC RBC-ENTMCNC: 27.8 PG
MCHC RBC-ENTMCNC: 31 GM/DL
MCV RBC AUTO: 89.8 FL
MONOCYTES # BLD AUTO: 0.55 K/UL
MONOCYTES NFR BLD AUTO: 5.4 %
NEUTROPHILS # BLD AUTO: 7.81 K/UL
NEUTROPHILS NFR BLD AUTO: 76.6 %
PLATELET # BLD AUTO: 252 K/UL
POTASSIUM SERPL-SCNC: 4.5 MMOL/L
PROT SERPL-MCNC: 6.8 G/DL
RBC # BLD: 4.42 M/UL
RBC # FLD: 16.3 %
SODIUM SERPL-SCNC: 142 MMOL/L
WBC # FLD AUTO: 10.19 K/UL

## 2018-07-09 DIAGNOSIS — Z71.89 OTHER SPECIFIED COUNSELING: ICD-10-CM

## 2018-07-16 PROBLEM — D49.6 NEOPLASM OF UNSPECIFIED BEHAVIOR OF BRAIN: Chronic | Status: ACTIVE | Noted: 2017-10-31

## 2018-07-18 ENCOUNTER — APPOINTMENT (OUTPATIENT)
Dept: ENDOCRINOLOGY | Facility: CLINIC | Age: 78
End: 2018-07-18
Payer: MEDICARE

## 2018-07-18 VITALS
HEIGHT: 60 IN | SYSTOLIC BLOOD PRESSURE: 130 MMHG | DIASTOLIC BLOOD PRESSURE: 80 MMHG | WEIGHT: 176 LBS | BODY MASS INDEX: 34.55 KG/M2 | OXYGEN SATURATION: 97 % | HEART RATE: 88 BPM

## 2018-07-18 DIAGNOSIS — D35.2 BENIGN NEOPLASM OF PITUITARY GLAND: ICD-10-CM

## 2018-07-18 PROCEDURE — 99205 OFFICE O/P NEW HI 60 MIN: CPT

## 2018-07-19 ENCOUNTER — RX RENEWAL (OUTPATIENT)
Age: 78
End: 2018-07-19

## 2018-07-20 LAB
CREAT SPEC-SCNC: 152 MG/DL
MICROALBUMIN 24H UR DL<=1MG/L-MCNC: <1.2 MG/DL
MICROALBUMIN/CREAT 24H UR-RTO: NORMAL
PROLACTIN SERPL-MCNC: 24.4 NG/ML
T4 FREE SERPL-MCNC: 1.2 NG/DL
TSH SERPL-ACNC: 0.67 UIU/ML

## 2018-07-22 PROBLEM — Z80.0 FAMILY HISTORY OF COLON CANCER: Status: INACTIVE | Noted: 2017-12-13

## 2018-07-26 ENCOUNTER — APPOINTMENT (OUTPATIENT)
Dept: GERIATRICS | Facility: CLINIC | Age: 78
End: 2018-07-26
Payer: MEDICARE

## 2018-07-26 VITALS
BODY MASS INDEX: 35.43 KG/M2 | SYSTOLIC BLOOD PRESSURE: 122 MMHG | TEMPERATURE: 97.9 F | DIASTOLIC BLOOD PRESSURE: 70 MMHG | HEART RATE: 75 BPM | WEIGHT: 181.4 LBS | OXYGEN SATURATION: 97 %

## 2018-07-26 PROCEDURE — 99214 OFFICE O/P EST MOD 30 MIN: CPT

## 2018-08-24 ENCOUNTER — APPOINTMENT (OUTPATIENT)
Dept: CARDIOLOGY | Facility: CLINIC | Age: 78
End: 2018-08-24
Payer: MEDICARE

## 2018-08-24 VITALS
OXYGEN SATURATION: 97 % | SYSTOLIC BLOOD PRESSURE: 128 MMHG | HEIGHT: 60 IN | HEART RATE: 82 BPM | DIASTOLIC BLOOD PRESSURE: 79 MMHG

## 2018-08-24 PROCEDURE — 93000 ELECTROCARDIOGRAM COMPLETE: CPT

## 2018-08-24 PROCEDURE — 99215 OFFICE O/P EST HI 40 MIN: CPT

## 2018-08-24 RX ORDER — OFLOXACIN 3 MG/ML
0.3 SOLUTION/ DROPS OPHTHALMIC
Qty: 5 | Refills: 0 | Status: COMPLETED | COMMUNITY
Start: 2018-06-13

## 2018-08-24 RX ORDER — LEVOTHYROXINE SODIUM 0.1 MG/1
100 TABLET ORAL
Qty: 90 | Refills: 0 | Status: COMPLETED | COMMUNITY
Start: 2018-04-20

## 2018-08-24 RX ORDER — PREDNISONE 20 MG/1
20 TABLET ORAL
Refills: 0 | Status: DISCONTINUED | COMMUNITY
Start: 2018-06-29 | End: 2018-08-24

## 2018-08-24 RX ORDER — OXYCODONE AND ACETAMINOPHEN 5; 325 MG/1; MG/1
5-325 TABLET ORAL
Qty: 20 | Refills: 0 | Status: COMPLETED | COMMUNITY
Start: 2018-07-13

## 2018-08-24 RX ORDER — NEOMYCIN AND POLYMYXIN B SULFATES AND DEXAMETHASONE 3.5; 10000; 1 MG/G; [IU]/G; MG/G
3.5-10000-0.1 OINTMENT OPHTHALMIC
Qty: 35 | Refills: 0 | Status: COMPLETED | COMMUNITY
Start: 2018-08-23

## 2018-08-24 RX ORDER — NEOMYCIN SULFATE, POLYMYXIN B SULFATE AND DEXAMETHASONE 3.5; 10000; 1 MG/ML; [USP'U]/ML; MG/ML
3.5-10000-0.1 SUSPENSION OPHTHALMIC
Qty: 5 | Refills: 0 | Status: COMPLETED | COMMUNITY
Start: 2018-08-23

## 2018-08-24 RX ORDER — CICLOPIROX 80 MG/ML
8 SOLUTION TOPICAL
Qty: 66 | Refills: 0 | Status: COMPLETED | COMMUNITY
Start: 2018-04-21

## 2018-08-24 RX ORDER — DORZOLAMIDE HYDROCHLORIDE TIMOLOL MALEATE 20; 5 MG/ML; MG/ML
22.3-6.8 SOLUTION/ DROPS OPHTHALMIC TWICE DAILY
Refills: 0 | Status: DISCONTINUED | COMMUNITY
Start: 2018-03-28 | End: 2018-08-24

## 2018-08-24 RX ORDER — TIMOLOL MALEATE 5 MG/ML
0.5 SOLUTION OPHTHALMIC
Qty: 10 | Refills: 0 | Status: COMPLETED | COMMUNITY
Start: 2018-02-06

## 2018-09-18 ENCOUNTER — RX RENEWAL (OUTPATIENT)
Age: 78
End: 2018-09-18

## 2018-09-28 ENCOUNTER — APPOINTMENT (OUTPATIENT)
Dept: GERIATRICS | Facility: CLINIC | Age: 78
End: 2018-09-28
Payer: MEDICARE

## 2018-09-28 VITALS
BODY MASS INDEX: 34.92 KG/M2 | OXYGEN SATURATION: 97 % | SYSTOLIC BLOOD PRESSURE: 136 MMHG | DIASTOLIC BLOOD PRESSURE: 64 MMHG | HEART RATE: 77 BPM | TEMPERATURE: 97.5 F | WEIGHT: 178.8 LBS

## 2018-09-28 DIAGNOSIS — R19.4 CHANGE IN BOWEL HABIT: ICD-10-CM

## 2018-09-28 DIAGNOSIS — R74.0 NONSPECIFIC ELEVATION OF LEVELS OF TRANSAMINASE AND LACTIC ACID DEHYDROGENASE [LDH]: ICD-10-CM

## 2018-09-28 DIAGNOSIS — Z01.818 ENCOUNTER FOR OTHER PREPROCEDURAL EXAMINATION: ICD-10-CM

## 2018-09-28 DIAGNOSIS — Z85.830 PERSONAL HISTORY OF MALIGNANT NEOPLASM OF BONE: ICD-10-CM

## 2018-09-28 DIAGNOSIS — R14.0 ABDOMINAL DISTENSION (GASEOUS): ICD-10-CM

## 2018-09-28 DIAGNOSIS — Z13.21 ENCOUNTER FOR SCREENING FOR NUTRITIONAL DISORDER: ICD-10-CM

## 2018-09-28 DIAGNOSIS — Z86.2 PERSONAL HISTORY OF DISEASES OF THE BLOOD AND BLOOD-FORMING ORGANS AND CERTAIN DISORDERS INVOLVING THE IMMUNE MECHANISM: ICD-10-CM

## 2018-09-28 DIAGNOSIS — Z87.898 PERSONAL HISTORY OF OTHER SPECIFIED CONDITIONS: ICD-10-CM

## 2018-09-28 DIAGNOSIS — R79.89 OTHER SPECIFIED ABNORMAL FINDINGS OF BLOOD CHEMISTRY: ICD-10-CM

## 2018-09-28 DIAGNOSIS — J34.89 OTHER SPECIFIED DISORDERS OF NOSE AND NASAL SINUSES: ICD-10-CM

## 2018-09-28 PROCEDURE — 99215 OFFICE O/P EST HI 40 MIN: CPT | Mod: 25

## 2018-09-28 PROCEDURE — 90662 IIV NO PRSV INCREASED AG IM: CPT

## 2018-09-28 PROCEDURE — G0008: CPT

## 2018-10-01 ENCOUNTER — RX CHANGE (OUTPATIENT)
Age: 78
End: 2018-10-01

## 2018-10-01 LAB
FOLATE SERPL-MCNC: 9.7 NG/ML
HBA1C MFR BLD HPLC: 5.6 %
VIT B12 SERPL-MCNC: 337 PG/ML

## 2018-10-02 ENCOUNTER — LABORATORY RESULT (OUTPATIENT)
Age: 78
End: 2018-10-02

## 2018-10-02 LAB
HOMOCYSTEINE LEVEL: 14.8 UMOL/L
METHYLMALONIC ACID LEVEL: 102 NMOL/L

## 2018-10-05 LAB — C DIFF TOX B STL QL CT TISS CULT: NORMAL

## 2018-10-07 LAB
FAT STL QN: NORMAL
FAT STL QN: NORMAL

## 2018-10-30 ENCOUNTER — APPOINTMENT (OUTPATIENT)
Dept: ENDOCRINOLOGY | Facility: CLINIC | Age: 78
End: 2018-10-30
Payer: MEDICARE

## 2018-10-30 VITALS
HEIGHT: 60 IN | WEIGHT: 174 LBS | HEART RATE: 78 BPM | SYSTOLIC BLOOD PRESSURE: 110 MMHG | OXYGEN SATURATION: 97 % | DIASTOLIC BLOOD PRESSURE: 80 MMHG | BODY MASS INDEX: 34.16 KG/M2

## 2018-10-30 PROCEDURE — 99214 OFFICE O/P EST MOD 30 MIN: CPT

## 2018-11-01 LAB
PROLACTIN SERPL-MCNC: 25.9 NG/ML
T4 FREE SERPL-MCNC: 1.1 NG/DL
TSH SERPL-ACNC: 0.38 UIU/ML

## 2018-11-27 ENCOUNTER — APPOINTMENT (OUTPATIENT)
Dept: GASTROENTEROLOGY | Facility: CLINIC | Age: 78
End: 2018-11-27
Payer: MEDICARE

## 2018-11-27 VITALS
HEIGHT: 61 IN | DIASTOLIC BLOOD PRESSURE: 78 MMHG | SYSTOLIC BLOOD PRESSURE: 129 MMHG | WEIGHT: 178 LBS | BODY MASS INDEX: 33.61 KG/M2 | HEART RATE: 80 BPM

## 2018-11-27 PROCEDURE — 99214 OFFICE O/P EST MOD 30 MIN: CPT

## 2018-12-08 ENCOUNTER — FORM ENCOUNTER (OUTPATIENT)
Age: 78
End: 2018-12-08

## 2018-12-09 ENCOUNTER — OUTPATIENT (OUTPATIENT)
Dept: OUTPATIENT SERVICES | Facility: HOSPITAL | Age: 78
LOS: 1 days | End: 2018-12-09
Payer: MEDICARE

## 2018-12-09 ENCOUNTER — APPOINTMENT (OUTPATIENT)
Dept: CT IMAGING | Facility: IMAGING CENTER | Age: 78
End: 2018-12-09
Payer: MEDICARE

## 2018-12-09 DIAGNOSIS — S06.5X9A TRAUMATIC SUBDURAL HEMORRHAGE WITH LOSS OF CONSCIOUSNESS OF UNSPECIFIED DURATION, INITIAL ENCOUNTER: Chronic | ICD-10-CM

## 2018-12-09 DIAGNOSIS — R10.13 EPIGASTRIC PAIN: ICD-10-CM

## 2018-12-09 PROCEDURE — 74177 CT ABD & PELVIS W/CONTRAST: CPT

## 2018-12-09 PROCEDURE — 82565 ASSAY OF CREATININE: CPT

## 2018-12-09 PROCEDURE — 74177 CT ABD & PELVIS W/CONTRAST: CPT | Mod: 26

## 2018-12-14 ENCOUNTER — APPOINTMENT (OUTPATIENT)
Dept: GERIATRICS | Facility: CLINIC | Age: 78
End: 2018-12-14
Payer: MEDICARE

## 2018-12-14 VITALS
DIASTOLIC BLOOD PRESSURE: 80 MMHG | SYSTOLIC BLOOD PRESSURE: 140 MMHG | HEIGHT: 61 IN | OXYGEN SATURATION: 96 % | RESPIRATION RATE: 16 BRPM | TEMPERATURE: 98.2 F | HEART RATE: 84 BPM

## 2018-12-14 PROCEDURE — 99215 OFFICE O/P EST HI 40 MIN: CPT

## 2018-12-17 ENCOUNTER — APPOINTMENT (OUTPATIENT)
Dept: GASTROENTEROLOGY | Facility: CLINIC | Age: 78
End: 2018-12-17
Payer: MEDICARE

## 2018-12-17 VITALS
HEART RATE: 76 BPM | WEIGHT: 180 LBS | SYSTOLIC BLOOD PRESSURE: 136 MMHG | BODY MASS INDEX: 33.99 KG/M2 | HEIGHT: 61 IN | DIASTOLIC BLOOD PRESSURE: 75 MMHG

## 2018-12-17 DIAGNOSIS — R93.5 ABNORMAL FINDINGS ON DIAGNOSTIC IMAGING OF OTHER ABDOMINAL REGIONS, INCLUDING RETROPERITONEUM: ICD-10-CM

## 2018-12-17 PROCEDURE — 36415 COLL VENOUS BLD VENIPUNCTURE: CPT

## 2018-12-17 PROCEDURE — 99214 OFFICE O/P EST MOD 30 MIN: CPT | Mod: 25

## 2018-12-18 LAB
25(OH)D3 SERPL-MCNC: 14.6 NG/ML
ALBUMIN SERPL ELPH-MCNC: 3.9 G/DL
ALP BLD-CCNC: 61 U/L
ALT SERPL-CCNC: 22 U/L
ANION GAP SERPL CALC-SCNC: 10 MMOL/L
AST SERPL-CCNC: 14 U/L
BILIRUB SERPL-MCNC: 0.2 MG/DL
BUN SERPL-MCNC: 10 MG/DL
CALCIUM SERPL-MCNC: 9.1 MG/DL
CHLORIDE SERPL-SCNC: 100 MMOL/L
CO2 SERPL-SCNC: 30 MMOL/L
CREAT SERPL-MCNC: 0.93 MG/DL
GLUCOSE SERPL-MCNC: 118 MG/DL
HBA1C MFR BLD HPLC: 5.5 %
POTASSIUM SERPL-SCNC: 3.9 MMOL/L
PROT SERPL-MCNC: 7 G/DL
SODIUM SERPL-SCNC: 140 MMOL/L

## 2018-12-27 ENCOUNTER — RX RENEWAL (OUTPATIENT)
Age: 78
End: 2018-12-27

## 2019-01-01 ENCOUNTER — MOBILE ON CALL (OUTPATIENT)
Age: 79
End: 2019-01-01

## 2019-01-01 ENCOUNTER — APPOINTMENT (OUTPATIENT)
Dept: GERIATRICS | Facility: CLINIC | Age: 79
End: 2019-01-01

## 2019-01-01 ENCOUNTER — APPOINTMENT (OUTPATIENT)
Dept: DERMATOLOGY | Facility: CLINIC | Age: 79
End: 2019-01-01
Payer: MEDICARE

## 2019-01-01 ENCOUNTER — APPOINTMENT (OUTPATIENT)
Dept: NEUROLOGY | Facility: CLINIC | Age: 79
End: 2019-01-01
Payer: MEDICARE

## 2019-01-01 ENCOUNTER — APPOINTMENT (OUTPATIENT)
Dept: CARDIOLOGY | Facility: CLINIC | Age: 79
End: 2019-01-01
Payer: MEDICARE

## 2019-01-01 ENCOUNTER — APPOINTMENT (OUTPATIENT)
Dept: GERIATRICS | Facility: CLINIC | Age: 79
End: 2019-01-01
Payer: MEDICARE

## 2019-01-01 ENCOUNTER — APPOINTMENT (OUTPATIENT)
Dept: ENDOCRINOLOGY | Facility: CLINIC | Age: 79
End: 2019-01-01
Payer: MEDICARE

## 2019-01-01 ENCOUNTER — APPOINTMENT (OUTPATIENT)
Dept: NEUROLOGY | Facility: CLINIC | Age: 79
End: 2019-01-01

## 2019-01-01 ENCOUNTER — RX RENEWAL (OUTPATIENT)
Age: 79
End: 2019-01-01

## 2019-01-01 ENCOUNTER — APPOINTMENT (OUTPATIENT)
Dept: MRI IMAGING | Facility: CLINIC | Age: 79
End: 2019-01-01
Payer: MEDICARE

## 2019-01-01 ENCOUNTER — APPOINTMENT (OUTPATIENT)
Dept: OTOLARYNGOLOGY | Facility: CLINIC | Age: 79
End: 2019-01-01
Payer: MEDICARE

## 2019-01-01 ENCOUNTER — RESULT CHARGE (OUTPATIENT)
Age: 79
End: 2019-01-01

## 2019-01-01 ENCOUNTER — APPOINTMENT (OUTPATIENT)
Dept: OPHTHALMOLOGY | Facility: CLINIC | Age: 79
End: 2019-01-01
Payer: MEDICARE

## 2019-01-01 ENCOUNTER — APPOINTMENT (OUTPATIENT)
Dept: OTOLARYNGOLOGY | Facility: CLINIC | Age: 79
End: 2019-01-01

## 2019-01-01 ENCOUNTER — APPOINTMENT (OUTPATIENT)
Dept: UROLOGY | Facility: CLINIC | Age: 79
End: 2019-01-01
Payer: MEDICARE

## 2019-01-01 ENCOUNTER — LABORATORY RESULT (OUTPATIENT)
Age: 79
End: 2019-01-01

## 2019-01-01 ENCOUNTER — OUTPATIENT (OUTPATIENT)
Dept: OUTPATIENT SERVICES | Facility: HOSPITAL | Age: 79
LOS: 1 days | End: 2019-01-01
Payer: MEDICARE

## 2019-01-01 ENCOUNTER — APPOINTMENT (OUTPATIENT)
Dept: MAMMOGRAPHY | Facility: CLINIC | Age: 79
End: 2019-01-01
Payer: MEDICARE

## 2019-01-01 ENCOUNTER — FORM ENCOUNTER (OUTPATIENT)
Age: 79
End: 2019-01-01

## 2019-01-01 ENCOUNTER — APPOINTMENT (OUTPATIENT)
Dept: GASTROENTEROLOGY | Facility: CLINIC | Age: 79
End: 2019-01-01
Payer: MEDICARE

## 2019-01-01 ENCOUNTER — APPOINTMENT (OUTPATIENT)
Dept: NEUROSURGERY | Facility: CLINIC | Age: 79
End: 2019-01-01
Payer: MEDICARE

## 2019-01-01 ENCOUNTER — INPATIENT (INPATIENT)
Facility: HOSPITAL | Age: 79
LOS: 5 days | Discharge: NURSING, REHAB OR LONG-TERM CARE | DRG: 25 | End: 2019-11-06
Attending: NEUROLOGICAL SURGERY | Admitting: NEUROLOGICAL SURGERY
Payer: MEDICARE

## 2019-01-01 ENCOUNTER — RESULT REVIEW (OUTPATIENT)
Age: 79
End: 2019-01-01

## 2019-01-01 ENCOUNTER — TRANSCRIPTION ENCOUNTER (OUTPATIENT)
Age: 79
End: 2019-01-01

## 2019-01-01 ENCOUNTER — APPOINTMENT (OUTPATIENT)
Dept: FAMILY MEDICINE | Facility: CLINIC | Age: 79
End: 2019-01-01
Payer: MEDICARE

## 2019-01-01 ENCOUNTER — APPOINTMENT (OUTPATIENT)
Dept: OTOLARYNGOLOGY | Facility: CLINIC | Age: 79
End: 2019-01-01
Payer: MEDICAID

## 2019-01-01 ENCOUNTER — APPOINTMENT (OUTPATIENT)
Dept: NEUROSURGERY | Facility: CLINIC | Age: 79
End: 2019-01-01

## 2019-01-01 ENCOUNTER — APPOINTMENT (OUTPATIENT)
Dept: OBGYN | Facility: CLINIC | Age: 79
End: 2019-01-01
Payer: MEDICARE

## 2019-01-01 ENCOUNTER — APPOINTMENT (OUTPATIENT)
Dept: OTOLARYNGOLOGY | Facility: HOSPITAL | Age: 79
End: 2019-01-01

## 2019-01-01 ENCOUNTER — APPOINTMENT (OUTPATIENT)
Dept: OPHTHALMOLOGY | Facility: CLINIC | Age: 79
End: 2019-01-01

## 2019-01-01 ENCOUNTER — NON-APPOINTMENT (OUTPATIENT)
Age: 79
End: 2019-01-01

## 2019-01-01 ENCOUNTER — RECORD ABSTRACTING (OUTPATIENT)
Age: 79
End: 2019-01-01

## 2019-01-01 ENCOUNTER — APPOINTMENT (OUTPATIENT)
Dept: ULTRASOUND IMAGING | Facility: CLINIC | Age: 79
End: 2019-01-01
Payer: MEDICARE

## 2019-01-01 ENCOUNTER — OTHER (OUTPATIENT)
Age: 79
End: 2019-01-01

## 2019-01-01 ENCOUNTER — RX CHANGE (OUTPATIENT)
Age: 79
End: 2019-01-01

## 2019-01-01 ENCOUNTER — APPOINTMENT (OUTPATIENT)
Dept: MRI IMAGING | Facility: HOSPITAL | Age: 79
End: 2019-01-01

## 2019-01-01 ENCOUNTER — OUTPATIENT (OUTPATIENT)
Dept: OUTPATIENT SERVICES | Facility: HOSPITAL | Age: 79
LOS: 1 days | End: 2019-01-01

## 2019-01-01 ENCOUNTER — APPOINTMENT (OUTPATIENT)
Dept: MRI IMAGING | Facility: IMAGING CENTER | Age: 79
End: 2019-01-01
Payer: MEDICARE

## 2019-01-01 VITALS
WEIGHT: 185 LBS | DIASTOLIC BLOOD PRESSURE: 62 MMHG | BODY MASS INDEX: 34.93 KG/M2 | HEART RATE: 76 BPM | SYSTOLIC BLOOD PRESSURE: 113 MMHG | HEIGHT: 61 IN

## 2019-01-01 VITALS — HEIGHT: 61 IN | HEART RATE: 80 BPM | BODY MASS INDEX: 34.74 KG/M2 | WEIGHT: 184 LBS | OXYGEN SATURATION: 95 %

## 2019-01-01 VITALS
HEART RATE: 61 BPM | DIASTOLIC BLOOD PRESSURE: 78 MMHG | WEIGHT: 186 LBS | HEIGHT: 59 IN | BODY MASS INDEX: 37.5 KG/M2 | SYSTOLIC BLOOD PRESSURE: 120 MMHG | OXYGEN SATURATION: 97 %

## 2019-01-01 VITALS
TEMPERATURE: 98.6 F | HEART RATE: 86 BPM | SYSTOLIC BLOOD PRESSURE: 120 MMHG | DIASTOLIC BLOOD PRESSURE: 74 MMHG | BODY MASS INDEX: 43.05 KG/M2 | WEIGHT: 186 LBS | HEIGHT: 55 IN

## 2019-01-01 VITALS — SYSTOLIC BLOOD PRESSURE: 138 MMHG | DIASTOLIC BLOOD PRESSURE: 82 MMHG

## 2019-01-01 VITALS
HEART RATE: 75 BPM | BODY MASS INDEX: 36.2 KG/M2 | HEIGHT: 59.5 IN | SYSTOLIC BLOOD PRESSURE: 147 MMHG | WEIGHT: 182 LBS | DIASTOLIC BLOOD PRESSURE: 85 MMHG

## 2019-01-01 VITALS
OXYGEN SATURATION: 93 % | HEART RATE: 70 BPM | DIASTOLIC BLOOD PRESSURE: 80 MMHG | SYSTOLIC BLOOD PRESSURE: 118 MMHG | HEIGHT: 61 IN

## 2019-01-01 VITALS
DIASTOLIC BLOOD PRESSURE: 68 MMHG | BODY MASS INDEX: 37.4 KG/M2 | HEIGHT: 59.5 IN | SYSTOLIC BLOOD PRESSURE: 110 MMHG | WEIGHT: 188 LBS | TEMPERATURE: 96.8 F | HEART RATE: 83 BPM | OXYGEN SATURATION: 96 %

## 2019-01-01 VITALS
DIASTOLIC BLOOD PRESSURE: 72 MMHG | WEIGHT: 183.56 LBS | SYSTOLIC BLOOD PRESSURE: 130 MMHG | BODY MASS INDEX: 34.66 KG/M2 | HEIGHT: 61 IN

## 2019-01-01 VITALS
BODY MASS INDEX: 33.99 KG/M2 | WEIGHT: 180 LBS | SYSTOLIC BLOOD PRESSURE: 105 MMHG | RESPIRATION RATE: 17 BRPM | DIASTOLIC BLOOD PRESSURE: 68 MMHG | HEART RATE: 79 BPM | TEMPERATURE: 97.4 F | HEIGHT: 61 IN

## 2019-01-01 VITALS — HEART RATE: 89 BPM | DIASTOLIC BLOOD PRESSURE: 81 MMHG | OXYGEN SATURATION: 93 % | SYSTOLIC BLOOD PRESSURE: 145 MMHG

## 2019-01-01 VITALS
HEART RATE: 64 BPM | WEIGHT: 186 LBS | BODY MASS INDEX: 37.5 KG/M2 | SYSTOLIC BLOOD PRESSURE: 194 MMHG | DIASTOLIC BLOOD PRESSURE: 100 MMHG | HEIGHT: 59 IN

## 2019-01-01 VITALS
BODY MASS INDEX: 43.05 KG/M2 | SYSTOLIC BLOOD PRESSURE: 110 MMHG | RESPIRATION RATE: 17 BRPM | OXYGEN SATURATION: 94 % | WEIGHT: 186 LBS | DIASTOLIC BLOOD PRESSURE: 62 MMHG | HEART RATE: 71 BPM | HEIGHT: 55 IN | TEMPERATURE: 98.1 F

## 2019-01-01 VITALS
DIASTOLIC BLOOD PRESSURE: 69 MMHG | HEIGHT: 61 IN | BODY MASS INDEX: 34.55 KG/M2 | WEIGHT: 183 LBS | HEART RATE: 80 BPM | SYSTOLIC BLOOD PRESSURE: 116 MMHG

## 2019-01-01 VITALS
WEIGHT: 184.09 LBS | SYSTOLIC BLOOD PRESSURE: 134 MMHG | HEIGHT: 61 IN | HEART RATE: 68 BPM | OXYGEN SATURATION: 100 % | TEMPERATURE: 98 F | DIASTOLIC BLOOD PRESSURE: 84 MMHG | RESPIRATION RATE: 18 BRPM

## 2019-01-01 VITALS
WEIGHT: 184 LBS | DIASTOLIC BLOOD PRESSURE: 80 MMHG | BODY MASS INDEX: 34.74 KG/M2 | SYSTOLIC BLOOD PRESSURE: 130 MMHG | OXYGEN SATURATION: 95 % | HEIGHT: 61 IN | HEART RATE: 67 BPM

## 2019-01-01 VITALS — BODY MASS INDEX: 37.57 KG/M2 | WEIGHT: 186 LBS

## 2019-01-01 VITALS
SYSTOLIC BLOOD PRESSURE: 138 MMHG | HEIGHT: 59 IN | TEMPERATURE: 97.8 F | HEART RATE: 82 BPM | DIASTOLIC BLOOD PRESSURE: 80 MMHG | RESPIRATION RATE: 16 BRPM | OXYGEN SATURATION: 91 %

## 2019-01-01 VITALS — WEIGHT: 182 LBS | HEIGHT: 59.5 IN | BODY MASS INDEX: 36.2 KG/M2

## 2019-01-01 VITALS
OXYGEN SATURATION: 98 % | HEIGHT: 61 IN | SYSTOLIC BLOOD PRESSURE: 120 MMHG | TEMPERATURE: 98 F | WEIGHT: 184.09 LBS | DIASTOLIC BLOOD PRESSURE: 76 MMHG | RESPIRATION RATE: 16 BRPM | HEART RATE: 80 BPM

## 2019-01-01 VITALS
BODY MASS INDEX: 40.98 KG/M2 | SYSTOLIC BLOOD PRESSURE: 127 MMHG | HEIGHT: 59.5 IN | RESPIRATION RATE: 18 BRPM | TEMPERATURE: 97.8 F | WEIGHT: 206 LBS | DIASTOLIC BLOOD PRESSURE: 74 MMHG | OXYGEN SATURATION: 97 % | HEART RATE: 65 BPM

## 2019-01-01 VITALS
HEART RATE: 84 BPM | RESPIRATION RATE: 16 BRPM | OXYGEN SATURATION: 97 % | SYSTOLIC BLOOD PRESSURE: 112 MMHG | DIASTOLIC BLOOD PRESSURE: 70 MMHG

## 2019-01-01 VITALS — DIASTOLIC BLOOD PRESSURE: 87 MMHG | SYSTOLIC BLOOD PRESSURE: 144 MMHG | HEART RATE: 66 BPM

## 2019-01-01 VITALS
DIASTOLIC BLOOD PRESSURE: 78 MMHG | HEART RATE: 67 BPM | OXYGEN SATURATION: 92 % | TEMPERATURE: 98 F | SYSTOLIC BLOOD PRESSURE: 130 MMHG

## 2019-01-01 VITALS
TEMPERATURE: 97.4 F | SYSTOLIC BLOOD PRESSURE: 138 MMHG | WEIGHT: 186 LBS | HEIGHT: 59 IN | OXYGEN SATURATION: 92 % | DIASTOLIC BLOOD PRESSURE: 80 MMHG | RESPIRATION RATE: 16 BRPM | HEART RATE: 70 BPM | BODY MASS INDEX: 37.5 KG/M2

## 2019-01-01 VITALS
DIASTOLIC BLOOD PRESSURE: 67 MMHG | HEART RATE: 4 BPM | WEIGHT: 182 LBS | SYSTOLIC BLOOD PRESSURE: 105 MMHG | BODY MASS INDEX: 36.2 KG/M2 | HEIGHT: 59.5 IN

## 2019-01-01 VITALS
DIASTOLIC BLOOD PRESSURE: 69 MMHG | HEIGHT: 55 IN | WEIGHT: 186 LBS | BODY MASS INDEX: 43.05 KG/M2 | SYSTOLIC BLOOD PRESSURE: 127 MMHG | HEART RATE: 82 BPM

## 2019-01-01 VITALS
DIASTOLIC BLOOD PRESSURE: 85 MMHG | OXYGEN SATURATION: 98 % | RESPIRATION RATE: 18 BRPM | SYSTOLIC BLOOD PRESSURE: 135 MMHG | TEMPERATURE: 98 F | HEART RATE: 76 BPM

## 2019-01-01 VITALS
HEART RATE: 79 BPM | OXYGEN SATURATION: 96 % | SYSTOLIC BLOOD PRESSURE: 130 MMHG | DIASTOLIC BLOOD PRESSURE: 80 MMHG | WEIGHT: 183.6 LBS | BODY MASS INDEX: 34.69 KG/M2 | TEMPERATURE: 97.7 F

## 2019-01-01 VITALS
RESPIRATION RATE: 15 BRPM | BODY MASS INDEX: 34.95 KG/M2 | SYSTOLIC BLOOD PRESSURE: 140 MMHG | OXYGEN SATURATION: 96 % | DIASTOLIC BLOOD PRESSURE: 80 MMHG | HEART RATE: 78 BPM | TEMPERATURE: 98.6 F | HEIGHT: 61 IN | WEIGHT: 185.13 LBS

## 2019-01-01 DIAGNOSIS — K59.03 DRUG INDUCED CONSTIPATION: ICD-10-CM

## 2019-01-01 DIAGNOSIS — Z96.22 MYRINGOTOMY TUBE(S) STATUS: Chronic | ICD-10-CM

## 2019-01-01 DIAGNOSIS — Z91.89 OTHER SPECIFIED PERSONAL RISK FACTORS, NOT ELSEWHERE CLASSIFIED: ICD-10-CM

## 2019-01-01 DIAGNOSIS — N18.3 CHRONIC KIDNEY DISEASE, STAGE 3 (MODERATE): ICD-10-CM

## 2019-01-01 DIAGNOSIS — Z01.818 ENCOUNTER FOR OTHER PREPROCEDURAL EXAMINATION: ICD-10-CM

## 2019-01-01 DIAGNOSIS — S06.5X9A TRAUMATIC SUBDURAL HEMORRHAGE WITH LOSS OF CONSCIOUSNESS OF UNSPECIFIED DURATION, INITIAL ENCOUNTER: Chronic | ICD-10-CM

## 2019-01-01 DIAGNOSIS — K52.9 NONINFECTIVE GASTROENTERITIS AND COLITIS, UNSPECIFIED: ICD-10-CM

## 2019-01-01 DIAGNOSIS — Z01.810 ENCOUNTER FOR PREPROCEDURAL CARDIOVASCULAR EXAMINATION: ICD-10-CM

## 2019-01-01 DIAGNOSIS — R92.1 MAMMOGRAPHIC CALCIFICATION FOUND ON DIAGNOSTIC IMAGING OF BREAST: ICD-10-CM

## 2019-01-01 DIAGNOSIS — D49.6 NEOPLASM OF UNSPECIFIED BEHAVIOR OF BRAIN: ICD-10-CM

## 2019-01-01 DIAGNOSIS — D48.5 NEOPLASM OF UNCERTAIN BEHAVIOR OF SKIN: ICD-10-CM

## 2019-01-01 DIAGNOSIS — R92.8 OTHER ABNORMAL AND INCONCLUSIVE FINDINGS ON DIAGNOSTIC IMAGING OF BREAST: ICD-10-CM

## 2019-01-01 DIAGNOSIS — Z00.8 ENCOUNTER FOR OTHER GENERAL EXAMINATION: ICD-10-CM

## 2019-01-01 DIAGNOSIS — Z87.09 PERSONAL HISTORY OF OTHER DISEASES OF THE RESPIRATORY SYSTEM: ICD-10-CM

## 2019-01-01 DIAGNOSIS — Z90.01 ACQUIRED ABSENCE OF EYE: Chronic | ICD-10-CM

## 2019-01-01 DIAGNOSIS — K21.9 GASTRO-ESOPHAGEAL REFLUX DISEASE W/OUT ESOPHAGITIS: ICD-10-CM

## 2019-01-01 DIAGNOSIS — L81.4 OTHER MELANIN HYPERPIGMENTATION: ICD-10-CM

## 2019-01-01 DIAGNOSIS — Z87.898 PERSONAL HISTORY OF OTHER SPECIFIED CONDITIONS: ICD-10-CM

## 2019-01-01 DIAGNOSIS — Z86.69 PERSONAL HISTORY OF OTHER DISEASES OF THE NERVOUS SYSTEM AND SENSE ORGANS: ICD-10-CM

## 2019-01-01 DIAGNOSIS — Z01.419 ENCOUNTER FOR GYNECOLOGICAL EXAMINATION (GENERAL) (ROUTINE) W/OUT ABNORMAL FINDINGS: ICD-10-CM

## 2019-01-01 DIAGNOSIS — D32.9 BENIGN NEOPLASM OF MENINGES, UNSPECIFIED: ICD-10-CM

## 2019-01-01 DIAGNOSIS — R19.8 OTHER SPECIFIED SYMPTOMS AND SIGNS INVOLVING THE DIGESTIVE SYSTEM AND ABDOMEN: ICD-10-CM

## 2019-01-01 DIAGNOSIS — Z23 ENCOUNTER FOR IMMUNIZATION: ICD-10-CM

## 2019-01-01 DIAGNOSIS — G31.84 MILD COGNITIVE IMPAIRMENT, SO STATED: ICD-10-CM

## 2019-01-01 DIAGNOSIS — R53.81 OTHER MALAISE: ICD-10-CM

## 2019-01-01 DIAGNOSIS — H54.40 BLINDNESS, ONE EYE, UNSPECIFIED EYE: ICD-10-CM

## 2019-01-01 DIAGNOSIS — E53.8 DEFICIENCY OF OTHER SPECIFIED B GROUP VITAMINS: ICD-10-CM

## 2019-01-01 DIAGNOSIS — N64.89 OTHER SPECIFIED DISORDERS OF BREAST: ICD-10-CM

## 2019-01-01 DIAGNOSIS — I10 ESSENTIAL (PRIMARY) HYPERTENSION: ICD-10-CM

## 2019-01-01 DIAGNOSIS — Z87.720 PERSONAL HISTORY OF (CORRECTED) CONGENITAL MALFORMATIONS OF EYE: ICD-10-CM

## 2019-01-01 DIAGNOSIS — E66.9 OBESITY, UNSPECIFIED: ICD-10-CM

## 2019-01-01 DIAGNOSIS — J39.2 OTHER DISEASES OF PHARYNX: ICD-10-CM

## 2019-01-01 DIAGNOSIS — Z92.29 PERSONAL HISTORY OF OTHER DRUG THERAPY: ICD-10-CM

## 2019-01-01 DIAGNOSIS — F51.04 PSYCHOPHYSIOLOGIC INSOMNIA: ICD-10-CM

## 2019-01-01 DIAGNOSIS — Z87.19 PERSONAL HISTORY OF OTHER DISEASES OF THE DIGESTIVE SYSTEM: ICD-10-CM

## 2019-01-01 DIAGNOSIS — E27.8 OTHER SPECIFIED DISORDERS OF ADRENAL GLAND: ICD-10-CM

## 2019-01-01 DIAGNOSIS — M25.552 PAIN IN LEFT HIP: ICD-10-CM

## 2019-01-01 DIAGNOSIS — R00.2 PALPITATIONS: ICD-10-CM

## 2019-01-01 DIAGNOSIS — Z13.820 ENCOUNTER FOR SCREENING FOR OSTEOPOROSIS: ICD-10-CM

## 2019-01-01 DIAGNOSIS — F33.1 MAJOR DEPRESSIVE DISORDER, RECURRENT, MODERATE: ICD-10-CM

## 2019-01-01 DIAGNOSIS — F32.9 MAJOR DEPRESSIVE DISORDER, SINGLE EPISODE, UNSPECIFIED: ICD-10-CM

## 2019-01-01 DIAGNOSIS — Z79.899 OTHER LONG TERM (CURRENT) DRUG THERAPY: ICD-10-CM

## 2019-01-01 DIAGNOSIS — R26.81 UNSTEADINESS ON FEET: ICD-10-CM

## 2019-01-01 DIAGNOSIS — Z12.31 ENCOUNTER FOR SCREENING MAMMOGRAM FOR MALIGNANT NEOPLASM OF BREAST: ICD-10-CM

## 2019-01-01 DIAGNOSIS — R44.3 HALLUCINATIONS, UNSPECIFIED: ICD-10-CM

## 2019-01-01 DIAGNOSIS — L81.8 OTHER SPECIFIED DISORDERS OF PIGMENTATION: ICD-10-CM

## 2019-01-01 DIAGNOSIS — G40.909 EPILEPSY, UNSPECIFIED, NOT INTRACTABLE, W/OUT STATUS EPILEPTICUS: ICD-10-CM

## 2019-01-01 DIAGNOSIS — R22.0 LOCALIZED SWELLING, MASS AND LUMP, HEAD: ICD-10-CM

## 2019-01-01 DIAGNOSIS — Z29.9 ENCOUNTER FOR PROPHYLACTIC MEASURES, UNSPECIFIED: ICD-10-CM

## 2019-01-01 DIAGNOSIS — B07.8 OTHER VIRAL WARTS: ICD-10-CM

## 2019-01-01 DIAGNOSIS — E11.9 TYPE 2 DIABETES MELLITUS WITHOUT COMPLICATIONS: ICD-10-CM

## 2019-01-01 DIAGNOSIS — M79.2 NEURALGIA AND NEURITIS, UNSPECIFIED: ICD-10-CM

## 2019-01-01 DIAGNOSIS — H47.20 UNSPECIFIED OPTIC ATROPHY: ICD-10-CM

## 2019-01-01 DIAGNOSIS — E55.9 VITAMIN D DEFICIENCY, UNSPECIFIED: ICD-10-CM

## 2019-01-01 DIAGNOSIS — G82.20 PARAPLEGIA, UNSPECIFIED: ICD-10-CM

## 2019-01-01 DIAGNOSIS — Z86.018 PERSONAL HISTORY OF OTHER BENIGN NEOPLASM: ICD-10-CM

## 2019-01-01 LAB
25(OH)D3 SERPL-MCNC: 12.7 NG/ML
25(OH)D3 SERPL-MCNC: 13.4 NG/ML
ALBUMIN SERPL ELPH-MCNC: 4.4 G/DL
ALP BLD-CCNC: 68 U/L
ALT SERPL-CCNC: 18 U/L
ANION GAP SERPL CALC-SCNC: 10 MMOL/L — SIGNIFICANT CHANGE UP (ref 5–17)
ANION GAP SERPL CALC-SCNC: 13 MMOL/L
ANION GAP SERPL CALC-SCNC: 13 MMOL/L
ANION GAP SERPL CALC-SCNC: 14 MMOL/L — SIGNIFICANT CHANGE UP (ref 5–17)
ANION GAP SERPL CALC-SCNC: 16 MMOL/L — SIGNIFICANT CHANGE UP (ref 5–17)
ANION GAP SERPL CALC-SCNC: 16 MMOL/L — SIGNIFICANT CHANGE UP (ref 5–17)
AST SERPL-CCNC: 15 U/L
BASOPHILS # BLD AUTO: 0.03 K/UL
BASOPHILS NFR BLD AUTO: 0.3 %
BILIRUB SERPL-MCNC: 0.4 MG/DL
BLD GP AB SCN SERPL QL: NEGATIVE — SIGNIFICANT CHANGE UP
BUN SERPL-MCNC: 10 MG/DL — SIGNIFICANT CHANGE UP (ref 7–23)
BUN SERPL-MCNC: 11 MG/DL
BUN SERPL-MCNC: 19 MG/DL
BUN SERPL-MCNC: 22 MG/DL — SIGNIFICANT CHANGE UP (ref 7–23)
CALCIUM SERPL-MCNC: 7.9 MG/DL — LOW (ref 8.4–10.5)
CALCIUM SERPL-MCNC: 8.3 MG/DL — LOW (ref 8.4–10.5)
CALCIUM SERPL-MCNC: 8.9 MG/DL — SIGNIFICANT CHANGE UP (ref 8.4–10.5)
CALCIUM SERPL-MCNC: 9.2 MG/DL — SIGNIFICANT CHANGE UP (ref 8.4–10.5)
CALCIUM SERPL-MCNC: 9.4 MG/DL
CALCIUM SERPL-MCNC: 9.9 MG/DL
CHLORIDE SERPL-SCNC: 100 MMOL/L
CHLORIDE SERPL-SCNC: 101 MMOL/L
CHLORIDE SERPL-SCNC: 104 MMOL/L — SIGNIFICANT CHANGE UP (ref 96–108)
CHLORIDE SERPL-SCNC: 109 MMOL/L — HIGH (ref 96–108)
CHLORIDE SERPL-SCNC: 98 MMOL/L — SIGNIFICANT CHANGE UP (ref 96–108)
CHLORIDE SERPL-SCNC: 98 MMOL/L — SIGNIFICANT CHANGE UP (ref 96–108)
CHOLEST SERPL-MCNC: 152 MG/DL
CHOLEST/HDLC SERPL: 4.2 RATIO
CO2 SERPL-SCNC: 22 MMOL/L — SIGNIFICANT CHANGE UP (ref 22–31)
CO2 SERPL-SCNC: 24 MMOL/L — SIGNIFICANT CHANGE UP (ref 22–31)
CO2 SERPL-SCNC: 27 MMOL/L
CO2 SERPL-SCNC: 28 MMOL/L
CO2 SERPL-SCNC: 29 MMOL/L — SIGNIFICANT CHANGE UP (ref 22–31)
CO2 SERPL-SCNC: 30 MMOL/L — SIGNIFICANT CHANGE UP (ref 22–31)
CORTIS 24H UR-MCNC: 24 H
CORTIS 24H UR-MCNC: 24 H
CORTIS 24H UR-MRATE: 17 MCG/24 H
CORTIS 24H UR-MRATE: 17 MCG/24 H
CORTIS SAL-MCNC: NORMAL
CREAT 24H UR-MCNC: 0.8 G/24 H
CREAT 24H UR-MCNC: 1.2 G/24 H
CREAT ?TM UR-MCNC: 117 MG/DL
CREAT ?TM UR-MCNC: 52 MG/DL
CREAT SERPL-MCNC: 0.7 MG/DL — SIGNIFICANT CHANGE UP (ref 0.5–1.3)
CREAT SERPL-MCNC: 0.74 MG/DL — SIGNIFICANT CHANGE UP (ref 0.5–1.3)
CREAT SERPL-MCNC: 0.86 MG/DL — SIGNIFICANT CHANGE UP (ref 0.5–1.3)
CREAT SERPL-MCNC: 1.01 MG/DL
CREAT SERPL-MCNC: 1.01 MG/DL — SIGNIFICANT CHANGE UP (ref 0.5–1.3)
CREAT SERPL-MCNC: 1.03 MG/DL
CREAT SPEC-SCNC: 188 MG/DL
EOSINOPHIL # BLD AUTO: 0.02 K/UL
EOSINOPHIL NFR BLD AUTO: 0.2 %
ESTIMATED AVERAGE GLUCOSE: 128 MG/DL
ESTIMATED AVERAGE GLUCOSE: 140 MG/DL
FOLATE SERPL-MCNC: 12.4 NG/ML
FOLATE SERPL-MCNC: 9.1 NG/ML
GAS PNL BLDA: SIGNIFICANT CHANGE UP
GLUCOSE BLDC GLUCOMTR-MCNC: 113 MG/DL — HIGH (ref 70–99)
GLUCOSE BLDC GLUCOMTR-MCNC: 129 MG/DL — HIGH (ref 70–99)
GLUCOSE BLDC GLUCOMTR-MCNC: 137 MG/DL — HIGH (ref 70–99)
GLUCOSE BLDC GLUCOMTR-MCNC: 149 MG/DL — HIGH (ref 70–99)
GLUCOSE BLDC GLUCOMTR-MCNC: 156 MG/DL — HIGH (ref 70–99)
GLUCOSE BLDC GLUCOMTR-MCNC: 157 MG/DL — HIGH (ref 70–99)
GLUCOSE BLDC GLUCOMTR-MCNC: 161 MG/DL — HIGH (ref 70–99)
GLUCOSE BLDC GLUCOMTR-MCNC: 162 MG/DL — HIGH (ref 70–99)
GLUCOSE BLDC GLUCOMTR-MCNC: 175 MG/DL — HIGH (ref 70–99)
GLUCOSE BLDC GLUCOMTR-MCNC: 185 MG/DL — HIGH (ref 70–99)
GLUCOSE BLDC GLUCOMTR-MCNC: 186 MG/DL — HIGH (ref 70–99)
GLUCOSE BLDC GLUCOMTR-MCNC: 193 MG/DL — HIGH (ref 70–99)
GLUCOSE BLDC GLUCOMTR-MCNC: 194 MG/DL — HIGH (ref 70–99)
GLUCOSE BLDC GLUCOMTR-MCNC: 196 MG/DL — HIGH (ref 70–99)
GLUCOSE BLDC GLUCOMTR-MCNC: 197 MG/DL — HIGH (ref 70–99)
GLUCOSE BLDC GLUCOMTR-MCNC: 201 MG/DL — HIGH (ref 70–99)
GLUCOSE BLDC GLUCOMTR-MCNC: 204 MG/DL — HIGH (ref 70–99)
GLUCOSE BLDC GLUCOMTR-MCNC: 207 MG/DL — HIGH (ref 70–99)
GLUCOSE BLDC GLUCOMTR-MCNC: 207 MG/DL — HIGH (ref 70–99)
GLUCOSE BLDC GLUCOMTR-MCNC: 213 MG/DL — HIGH (ref 70–99)
GLUCOSE BLDC GLUCOMTR-MCNC: 219 MG/DL — HIGH (ref 70–99)
GLUCOSE BLDC GLUCOMTR-MCNC: 221 MG/DL — HIGH (ref 70–99)
GLUCOSE BLDC GLUCOMTR-MCNC: 222 MG/DL — HIGH (ref 70–99)
GLUCOSE BLDC GLUCOMTR-MCNC: 227 MG/DL — HIGH (ref 70–99)
GLUCOSE BLDC GLUCOMTR-MCNC: 238 MG/DL — HIGH (ref 70–99)
GLUCOSE BLDC GLUCOMTR-MCNC: 254 MG/DL — HIGH (ref 70–99)
GLUCOSE SERPL-MCNC: 103 MG/DL
GLUCOSE SERPL-MCNC: 115 MG/DL — HIGH (ref 70–99)
GLUCOSE SERPL-MCNC: 203 MG/DL — HIGH (ref 70–99)
GLUCOSE SERPL-MCNC: 229 MG/DL — HIGH (ref 70–99)
GLUCOSE SERPL-MCNC: 258 MG/DL — HIGH (ref 70–99)
GLUCOSE SERPL-MCNC: 68 MG/DL
HBA1C BLD-MCNC: 5.9 % — HIGH (ref 4–5.6)
HBA1C MFR BLD HPLC: 6.1 %
HBA1C MFR BLD HPLC: 6.5 %
HCT VFR BLD CALC: 31.3 % — LOW (ref 34.5–45)
HCT VFR BLD CALC: 33.5 % — LOW (ref 34.5–45)
HCT VFR BLD CALC: 37.1 % — SIGNIFICANT CHANGE UP (ref 34.5–45)
HCT VFR BLD CALC: 38.3 % — SIGNIFICANT CHANGE UP (ref 34.5–45)
HCT VFR BLD CALC: 39.7 %
HCT VFR BLD CALC: 42 % — SIGNIFICANT CHANGE UP (ref 34.5–45)
HDLC SERPL-MCNC: 36 MG/DL
HGB BLD-MCNC: 10.1 G/DL — LOW (ref 11.5–15.5)
HGB BLD-MCNC: 10.4 G/DL — LOW (ref 11.5–15.5)
HGB BLD-MCNC: 11.8 G/DL
HGB BLD-MCNC: 12.1 G/DL — SIGNIFICANT CHANGE UP (ref 11.5–15.5)
HGB BLD-MCNC: 12.2 G/DL — SIGNIFICANT CHANGE UP (ref 11.5–15.5)
HGB BLD-MCNC: 12.6 G/DL — SIGNIFICANT CHANGE UP (ref 11.5–15.5)
IMM GRANULOCYTES NFR BLD AUTO: 0.4 %
INR BLD: 1.01 RATIO — SIGNIFICANT CHANGE UP (ref 0.88–1.16)
LDLC SERPL CALC-MCNC: 79 MG/DL
LEVETIRACETAM SERPL-MCNC: 63.5 MCG/ML
LYMPHOCYTES # BLD AUTO: 2.72 K/UL
LYMPHOCYTES NFR BLD AUTO: 29.5 %
MAGNESIUM SERPL-MCNC: 1.7 MG/DL — SIGNIFICANT CHANGE UP (ref 1.6–2.6)
MAGNESIUM SERPL-MCNC: 1.9 MG/DL — SIGNIFICANT CHANGE UP (ref 1.6–2.6)
MAN DIFF?: NORMAL
MCHC RBC-ENTMCNC: 25.8 PG
MCHC RBC-ENTMCNC: 26.3 PG — LOW (ref 27–34)
MCHC RBC-ENTMCNC: 26.4 PG — LOW (ref 27–34)
MCHC RBC-ENTMCNC: 26.7 PG — LOW (ref 27–34)
MCHC RBC-ENTMCNC: 27.1 PG — SIGNIFICANT CHANGE UP (ref 27–34)
MCHC RBC-ENTMCNC: 27.5 PG — SIGNIFICANT CHANGE UP (ref 27–34)
MCHC RBC-ENTMCNC: 29.7 GM/DL
MCHC RBC-ENTMCNC: 30 GM/DL — LOW (ref 32–36)
MCHC RBC-ENTMCNC: 31 GM/DL — LOW (ref 32–36)
MCHC RBC-ENTMCNC: 31.6 GM/DL — LOW (ref 32–36)
MCHC RBC-ENTMCNC: 32.3 GM/DL — SIGNIFICANT CHANGE UP (ref 32–36)
MCHC RBC-ENTMCNC: 32.9 GM/DL — SIGNIFICANT CHANGE UP (ref 32–36)
MCV RBC AUTO: 83.6 FL — SIGNIFICANT CHANGE UP (ref 80–100)
MCV RBC AUTO: 83.9 FL — SIGNIFICANT CHANGE UP (ref 80–100)
MCV RBC AUTO: 84.5 FL — SIGNIFICANT CHANGE UP (ref 80–100)
MCV RBC AUTO: 84.8 FL — SIGNIFICANT CHANGE UP (ref 80–100)
MCV RBC AUTO: 86.7 FL
MCV RBC AUTO: 88.1 FL — SIGNIFICANT CHANGE UP (ref 80–100)
MICROALBUMIN 24H UR DL<=1MG/L-MCNC: <1.2 MG/DL
MICROALBUMIN/CREAT 24H UR-RTO: NORMAL MG/G
MONOCYTES # BLD AUTO: 0.81 K/UL
MONOCYTES NFR BLD AUTO: 8.8 %
MRSA PCR RESULT.: SIGNIFICANT CHANGE UP
NEUTROPHILS # BLD AUTO: 5.59 K/UL
NEUTROPHILS NFR BLD AUTO: 60.8 %
NRBC # BLD: 0 /100 WBCS — SIGNIFICANT CHANGE UP (ref 0–0)
PHOSPHATE SERPL-MCNC: 2.5 MG/DL — SIGNIFICANT CHANGE UP (ref 2.5–4.5)
PHOSPHATE SERPL-MCNC: 3.6 MG/DL — SIGNIFICANT CHANGE UP (ref 2.5–4.5)
PLATELET # BLD AUTO: 190 K/UL — SIGNIFICANT CHANGE UP (ref 150–400)
PLATELET # BLD AUTO: 193 K/UL — SIGNIFICANT CHANGE UP (ref 150–400)
PLATELET # BLD AUTO: 210 K/UL — SIGNIFICANT CHANGE UP (ref 150–400)
PLATELET # BLD AUTO: 236 K/UL — SIGNIFICANT CHANGE UP (ref 150–400)
PLATELET # BLD AUTO: 240 K/UL — SIGNIFICANT CHANGE UP (ref 150–400)
PLATELET # BLD AUTO: 285 K/UL
POTASSIUM SERPL-MCNC: 3.2 MMOL/L — LOW (ref 3.5–5.3)
POTASSIUM SERPL-MCNC: 3.4 MMOL/L — LOW (ref 3.5–5.3)
POTASSIUM SERPL-MCNC: 4 MMOL/L — SIGNIFICANT CHANGE UP (ref 3.5–5.3)
POTASSIUM SERPL-MCNC: 4.3 MMOL/L — SIGNIFICANT CHANGE UP (ref 3.5–5.3)
POTASSIUM SERPL-SCNC: 3.2 MMOL/L — LOW (ref 3.5–5.3)
POTASSIUM SERPL-SCNC: 3.4 MMOL/L — LOW (ref 3.5–5.3)
POTASSIUM SERPL-SCNC: 4 MMOL/L — SIGNIFICANT CHANGE UP (ref 3.5–5.3)
POTASSIUM SERPL-SCNC: 4.3 MMOL/L — SIGNIFICANT CHANGE UP (ref 3.5–5.3)
POTASSIUM SERPL-SCNC: 4.5 MMOL/L
POTASSIUM SERPL-SCNC: 4.8 MMOL/L
PROT ?TM UR-MCNC: 24 HR
PROT ?TM UR-MCNC: 24 HR
PROT SERPL-MCNC: 7.3 G/DL
PROTHROM AB SERPL-ACNC: 11.6 SEC — SIGNIFICANT CHANGE UP (ref 10–12.9)
RBC # BLD: 3.73 M/UL — LOW (ref 3.8–5.2)
RBC # BLD: 3.95 M/UL — SIGNIFICANT CHANGE UP (ref 3.8–5.2)
RBC # BLD: 4.44 M/UL — SIGNIFICANT CHANGE UP (ref 3.8–5.2)
RBC # BLD: 4.53 M/UL — SIGNIFICANT CHANGE UP (ref 3.8–5.2)
RBC # BLD: 4.58 M/UL
RBC # BLD: 4.77 M/UL — SIGNIFICANT CHANGE UP (ref 3.8–5.2)
RBC # FLD: 16.1 % — HIGH (ref 10.3–14.5)
RBC # FLD: 16.3 % — HIGH (ref 10.3–14.5)
RBC # FLD: 16.5 % — HIGH (ref 10.3–14.5)
RBC # FLD: 16.5 % — HIGH (ref 10.3–14.5)
RBC # FLD: 16.6 % — HIGH (ref 10.3–14.5)
RBC # FLD: 16.9 %
RH IG SCN BLD-IMP: POSITIVE — SIGNIFICANT CHANGE UP
S AUREUS DNA NOSE QL NAA+PROBE: DETECTED
SODIUM SERPL-SCNC: 141 MMOL/L
SODIUM SERPL-SCNC: 141 MMOL/L
SODIUM SERPL-SCNC: 142 MMOL/L — SIGNIFICANT CHANGE UP (ref 135–145)
SODIUM SERPL-SCNC: 142 MMOL/L — SIGNIFICANT CHANGE UP (ref 135–145)
SODIUM SERPL-SCNC: 143 MMOL/L — SIGNIFICANT CHANGE UP (ref 135–145)
SODIUM SERPL-SCNC: 143 MMOL/L — SIGNIFICANT CHANGE UP (ref 135–145)
SPECIMEN VOL 24H UR: 2300 ML
SPECIMEN VOL 24H UR: 2300 ML
SPECIMEN VOL 24H UR: 650 ML
SPECIMEN VOL 24H UR: 650 ML
T PALLIDUM AB SER QL IA: NEGATIVE
TRIGL SERPL-MCNC: 187 MG/DL
TSH SERPL-ACNC: 1.11 UIU/ML
VIT B1 SERPL-MCNC: 70.3 NMOL/L
VIT B12 SERPL-MCNC: 204 PG/ML
VIT B12 SERPL-MCNC: 435 PG/ML
WBC # BLD: 12.88 K/UL — HIGH (ref 3.8–10.5)
WBC # BLD: 17.45 K/UL — HIGH (ref 3.8–10.5)
WBC # BLD: 17.7 K/UL — HIGH (ref 3.8–10.5)
WBC # BLD: 17.95 K/UL — HIGH (ref 3.8–10.5)
WBC # BLD: 8.43 K/UL — SIGNIFICANT CHANGE UP (ref 3.8–10.5)
WBC # FLD AUTO: 12.88 K/UL — HIGH (ref 3.8–10.5)
WBC # FLD AUTO: 17.45 K/UL — HIGH (ref 3.8–10.5)
WBC # FLD AUTO: 17.7 K/UL — HIGH (ref 3.8–10.5)
WBC # FLD AUTO: 17.95 K/UL — HIGH (ref 3.8–10.5)
WBC # FLD AUTO: 8.43 K/UL — SIGNIFICANT CHANGE UP (ref 3.8–10.5)
WBC # FLD AUTO: 9.21 K/UL

## 2019-01-01 PROCEDURE — 99024 POSTOP FOLLOW-UP VISIT: CPT

## 2019-01-01 PROCEDURE — 70553 MRI BRAIN STEM W/O & W/DYE: CPT | Mod: 26

## 2019-01-01 PROCEDURE — 99233 SBSQ HOSP IP/OBS HIGH 50: CPT

## 2019-01-01 PROCEDURE — 70543 MRI ORBT/FAC/NCK W/O &W/DYE: CPT | Mod: 26

## 2019-01-01 PROCEDURE — C1713: CPT

## 2019-01-01 PROCEDURE — 67412 EXPLORE/TREAT EYE SOCKET: CPT | Mod: LT

## 2019-01-01 PROCEDURE — 99214 OFFICE O/P EST MOD 30 MIN: CPT

## 2019-01-01 PROCEDURE — 80048 BASIC METABOLIC PNL TOTAL CA: CPT

## 2019-01-01 PROCEDURE — 86923 COMPATIBILITY TEST ELECTRIC: CPT

## 2019-01-01 PROCEDURE — 88341 IMHCHEM/IMCYTCHM EA ADD ANTB: CPT | Mod: 26,59

## 2019-01-01 PROCEDURE — 70553 MRI BRAIN STEM W/O & W/DYE: CPT

## 2019-01-01 PROCEDURE — 99215 OFFICE O/P EST HI 40 MIN: CPT | Mod: 25

## 2019-01-01 PROCEDURE — 93970 EXTREMITY STUDY: CPT

## 2019-01-01 PROCEDURE — A9585: CPT

## 2019-01-01 PROCEDURE — 99215 OFFICE O/P EST HI 40 MIN: CPT

## 2019-01-01 PROCEDURE — 82962 GLUCOSE BLOOD TEST: CPT

## 2019-01-01 PROCEDURE — 83605 ASSAY OF LACTIC ACID: CPT

## 2019-01-01 PROCEDURE — 92133 CPTRZD OPH DX IMG PST SGM ON: CPT

## 2019-01-01 PROCEDURE — 71045 X-RAY EXAM CHEST 1 VIEW: CPT | Mod: 26

## 2019-01-01 PROCEDURE — 99231 SBSQ HOSP IP/OBS SF/LOW 25: CPT

## 2019-01-01 PROCEDURE — 90662 IIV NO PRSV INCREASED AG IM: CPT

## 2019-01-01 PROCEDURE — 86901 BLOOD TYPING SEROLOGIC RH(D): CPT

## 2019-01-01 PROCEDURE — 85014 HEMATOCRIT: CPT

## 2019-01-01 PROCEDURE — C1751: CPT

## 2019-01-01 PROCEDURE — 77067 SCR MAMMO BI INCL CAD: CPT | Mod: 26

## 2019-01-01 PROCEDURE — 67882 REVISION OF EYELID: CPT | Mod: E1,E2

## 2019-01-01 PROCEDURE — 69433 CREATE EARDRUM OPENING: CPT | Mod: LT

## 2019-01-01 PROCEDURE — 69990 MICROSURGERY ADD-ON: CPT | Mod: 59

## 2019-01-01 PROCEDURE — 92083 EXTENDED VISUAL FIELD XM: CPT

## 2019-01-01 PROCEDURE — 97110 THERAPEUTIC EXERCISES: CPT

## 2019-01-01 PROCEDURE — 99397 PER PM REEVAL EST PAT 65+ YR: CPT

## 2019-01-01 PROCEDURE — 97116 GAIT TRAINING THERAPY: CPT

## 2019-01-01 PROCEDURE — 83036 HEMOGLOBIN GLYCOSYLATED A1C: CPT

## 2019-01-01 PROCEDURE — 99205 OFFICE O/P NEW HI 60 MIN: CPT

## 2019-01-01 PROCEDURE — 76641 ULTRASOUND BREAST COMPLETE: CPT | Mod: 26,50

## 2019-01-01 PROCEDURE — 86900 BLOOD TYPING SEROLOGIC ABO: CPT

## 2019-01-01 PROCEDURE — 77063 BREAST TOMOSYNTHESIS BI: CPT

## 2019-01-01 PROCEDURE — 77063 BREAST TOMOSYNTHESIS BI: CPT | Mod: 26

## 2019-01-01 PROCEDURE — 31231 NASAL ENDOSCOPY DX: CPT

## 2019-01-01 PROCEDURE — 88331 PATH CONSLTJ SURG 1 BLK 1SPC: CPT | Mod: 26

## 2019-01-01 PROCEDURE — 88342 IMHCHEM/IMCYTCHM 1ST ANTB: CPT | Mod: 26,59

## 2019-01-01 PROCEDURE — 97760 ORTHOTIC MGMT&TRAING 1ST ENC: CPT

## 2019-01-01 PROCEDURE — 90732 PPSV23 VACC 2 YRS+ SUBQ/IM: CPT

## 2019-01-01 PROCEDURE — 36430 TRANSFUSION BLD/BLD COMPNT: CPT

## 2019-01-01 PROCEDURE — ZZZZZ: CPT

## 2019-01-01 PROCEDURE — 99204 OFFICE O/P NEW MOD 45 MIN: CPT

## 2019-01-01 PROCEDURE — 76641 ULTRASOUND BREAST COMPLETE: CPT

## 2019-01-01 PROCEDURE — 82330 ASSAY OF CALCIUM: CPT

## 2019-01-01 PROCEDURE — 88333 PATH CONSLTJ SURG CYTO XM 1: CPT

## 2019-01-01 PROCEDURE — 93000 ELECTROCARDIOGRAM COMPLETE: CPT

## 2019-01-01 PROCEDURE — P9016: CPT

## 2019-01-01 PROCEDURE — 70450 CT HEAD/BRAIN W/O DYE: CPT

## 2019-01-01 PROCEDURE — 82803 BLOOD GASES ANY COMBINATION: CPT

## 2019-01-01 PROCEDURE — 82565 ASSAY OF CREATININE: CPT

## 2019-01-01 PROCEDURE — C1769: CPT

## 2019-01-01 PROCEDURE — 92610 EVALUATE SWALLOWING FUNCTION: CPT

## 2019-01-01 PROCEDURE — 84132 ASSAY OF SERUM POTASSIUM: CPT

## 2019-01-01 PROCEDURE — 77080 DXA BONE DENSITY AXIAL: CPT

## 2019-01-01 PROCEDURE — 92526 ORAL FUNCTION THERAPY: CPT

## 2019-01-01 PROCEDURE — 99222 1ST HOSP IP/OBS MODERATE 55: CPT | Mod: GC

## 2019-01-01 PROCEDURE — 99212 OFFICE O/P EST SF 10 MIN: CPT | Mod: 25

## 2019-01-01 PROCEDURE — 77067 SCR MAMMO BI INCL CAD: CPT

## 2019-01-01 PROCEDURE — C1889: CPT

## 2019-01-01 PROCEDURE — 88307 TISSUE EXAM BY PATHOLOGIST: CPT | Mod: 26

## 2019-01-01 PROCEDURE — 61583 CRANIOFACIAL APPROACH SKULL: CPT

## 2019-01-01 PROCEDURE — 84100 ASSAY OF PHOSPHORUS: CPT

## 2019-01-01 PROCEDURE — G0515: CPT

## 2019-01-01 PROCEDURE — G0009: CPT

## 2019-01-01 PROCEDURE — 70496 CT ANGIOGRAPHY HEAD: CPT

## 2019-01-01 PROCEDURE — G0008: CPT

## 2019-01-01 PROCEDURE — 74183 MRI ABD W/O CNTR FLWD CNTR: CPT | Mod: 26

## 2019-01-01 PROCEDURE — 11102 TANGNTL BX SKIN SINGLE LES: CPT | Mod: GC

## 2019-01-01 PROCEDURE — 87640 STAPH A DNA AMP PROBE: CPT

## 2019-01-01 PROCEDURE — 85027 COMPLETE CBC AUTOMATED: CPT

## 2019-01-01 PROCEDURE — 88360 TUMOR IMMUNOHISTOCHEM/MANUAL: CPT

## 2019-01-01 PROCEDURE — 61781 SCAN PROC CRANIAL INTRA: CPT

## 2019-01-01 PROCEDURE — 88360 TUMOR IMMUNOHISTOCHEM/MANUAL: CPT | Mod: 26

## 2019-01-01 PROCEDURE — 70543 MRI ORBT/FAC/NCK W/O &W/DYE: CPT

## 2019-01-01 PROCEDURE — 88307 TISSUE EXAM BY PATHOLOGIST: CPT

## 2019-01-01 PROCEDURE — 88334 PATH CONSLTJ SURG CYTO XM EA: CPT | Mod: 26,59

## 2019-01-01 PROCEDURE — 70496 CT ANGIOGRAPHY HEAD: CPT | Mod: 26

## 2019-01-01 PROCEDURE — 97166 OT EVAL MOD COMPLEX 45 MIN: CPT

## 2019-01-01 PROCEDURE — 82947 ASSAY GLUCOSE BLOOD QUANT: CPT

## 2019-01-01 PROCEDURE — 99203 OFFICE O/P NEW LOW 30 MIN: CPT

## 2019-01-01 PROCEDURE — 99204 OFFICE O/P NEW MOD 45 MIN: CPT | Mod: 25

## 2019-01-01 PROCEDURE — 87641 MR-STAPH DNA AMP PROBE: CPT

## 2019-01-01 PROCEDURE — 99483 ASSMT & CARE PLN PT COG IMP: CPT

## 2019-01-01 PROCEDURE — 97162 PT EVAL MOD COMPLEX 30 MIN: CPT

## 2019-01-01 PROCEDURE — G0463: CPT

## 2019-01-01 PROCEDURE — 93970 EXTREMITY STUDY: CPT | Mod: 26

## 2019-01-01 PROCEDURE — 92557 COMPREHENSIVE HEARING TEST: CPT

## 2019-01-01 PROCEDURE — 99203 OFFICE O/P NEW LOW 30 MIN: CPT | Mod: 25,GC

## 2019-01-01 PROCEDURE — 71045 X-RAY EXAM CHEST 1 VIEW: CPT

## 2019-01-01 PROCEDURE — 88331 PATH CONSLTJ SURG 1 BLK 1SPC: CPT

## 2019-01-01 PROCEDURE — 99212 OFFICE O/P EST SF 10 MIN: CPT

## 2019-01-01 PROCEDURE — 99291 CRITICAL CARE FIRST HOUR: CPT

## 2019-01-01 PROCEDURE — 74183 MRI ABD W/O CNTR FLWD CNTR: CPT

## 2019-01-01 PROCEDURE — 92567 TYMPANOMETRY: CPT

## 2019-01-01 PROCEDURE — 61608 RESECT/EXCISE CRANIAL LESION: CPT

## 2019-01-01 PROCEDURE — 70450 CT HEAD/BRAIN W/O DYE: CPT | Mod: 26

## 2019-01-01 PROCEDURE — 85610 PROTHROMBIN TIME: CPT

## 2019-01-01 PROCEDURE — 82435 ASSAY OF BLOOD CHLORIDE: CPT

## 2019-01-01 PROCEDURE — 71045 X-RAY EXAM CHEST 1 VIEW: CPT | Mod: 26,77

## 2019-01-01 PROCEDURE — 97535 SELF CARE MNGMENT TRAINING: CPT

## 2019-01-01 PROCEDURE — 84295 ASSAY OF SERUM SODIUM: CPT

## 2019-01-01 PROCEDURE — 86850 RBC ANTIBODY SCREEN: CPT

## 2019-01-01 PROCEDURE — 88342 IMHCHEM/IMCYTCHM 1ST ANTB: CPT

## 2019-01-01 PROCEDURE — 83735 ASSAY OF MAGNESIUM: CPT

## 2019-01-01 PROCEDURE — 88341 IMHCHEM/IMCYTCHM EA ADD ANTB: CPT

## 2019-01-01 RX ORDER — DOXAZOSIN MESYLATE 4 MG
1 TABLET ORAL
Qty: 0 | Refills: 0 | DISCHARGE
Start: 2019-01-01

## 2019-01-01 RX ORDER — ATORVASTATIN CALCIUM 80 MG/1
20 TABLET, FILM COATED ORAL AT BEDTIME
Refills: 0 | Status: DISCONTINUED | OUTPATIENT
Start: 2019-01-01 | End: 2019-01-01

## 2019-01-01 RX ORDER — INSULIN LISPRO 100/ML
4 VIAL (ML) SUBCUTANEOUS
Qty: 1 | Refills: 0
Start: 2019-01-01

## 2019-01-01 RX ORDER — POLYETHYLENE GLYCOL 3350 17 G/17G
17 POWDER, FOR SOLUTION ORAL EVERY 12 HOURS
Refills: 0 | Status: DISCONTINUED | OUTPATIENT
Start: 2019-01-01 | End: 2019-01-01

## 2019-01-01 RX ORDER — DEXAMETHASONE 0.5 MG/5ML
4 ELIXIR ORAL EVERY 8 HOURS
Refills: 0 | Status: DISCONTINUED | OUTPATIENT
Start: 2019-01-01 | End: 2019-01-01

## 2019-01-01 RX ORDER — SENNA PLUS 8.6 MG/1
2 TABLET ORAL
Qty: 0 | Refills: 0 | DISCHARGE
Start: 2019-01-01

## 2019-01-01 RX ORDER — LORATADINE 10 MG
17 TABLET,DISINTEGRATING ORAL
Refills: 0 | Status: DISCONTINUED | COMMUNITY
End: 2019-01-01

## 2019-01-01 RX ORDER — INSULIN LISPRO 100/ML
8 VIAL (ML) SUBCUTANEOUS EVERY 8 HOURS
Refills: 0 | Status: DISCONTINUED | OUTPATIENT
Start: 2019-01-01 | End: 2019-01-01

## 2019-01-01 RX ORDER — SIMVASTATIN 20 MG/1
20 TABLET, FILM COATED ORAL AT BEDTIME
Refills: 0 | Status: DISCONTINUED | OUTPATIENT
Start: 2019-01-01 | End: 2019-01-01

## 2019-01-01 RX ORDER — LEVOTHYROXINE SODIUM 125 MCG
50 TABLET ORAL DAILY
Refills: 0 | Status: DISCONTINUED | OUTPATIENT
Start: 2019-01-01 | End: 2019-01-01

## 2019-01-01 RX ORDER — METFORMIN HYDROCHLORIDE 850 MG/1
1 TABLET ORAL
Qty: 0 | Refills: 0 | DISCHARGE

## 2019-01-01 RX ORDER — NIFEDIPINE 30 MG
1 TABLET, EXTENDED RELEASE 24 HR ORAL
Qty: 0 | Refills: 0 | DISCHARGE
Start: 2019-01-01

## 2019-01-01 RX ORDER — SODIUM CHLORIDE 9 MG/ML
1000 INJECTION, SOLUTION INTRAVENOUS
Refills: 0 | Status: DISCONTINUED | OUTPATIENT
Start: 2019-01-01 | End: 2019-01-01

## 2019-01-01 RX ORDER — AMLODIPINE BESYLATE 2.5 MG/1
5 TABLET ORAL DAILY
Refills: 0 | Status: DISCONTINUED | OUTPATIENT
Start: 2019-01-01 | End: 2019-01-01

## 2019-01-01 RX ORDER — LEVETIRACETAM 250 MG/1
1 TABLET, FILM COATED ORAL
Qty: 0 | Refills: 0 | DISCHARGE

## 2019-01-01 RX ORDER — DEXTROSE 50 % IN WATER 50 %
15 SYRINGE (ML) INTRAVENOUS ONCE
Refills: 0 | Status: DISCONTINUED | OUTPATIENT
Start: 2019-01-01 | End: 2019-01-01

## 2019-01-01 RX ORDER — HUMAN INSULIN 100 [IU]/ML
10 INJECTION, SUSPENSION SUBCUTANEOUS EVERY 6 HOURS
Refills: 0 | Status: DISCONTINUED | OUTPATIENT
Start: 2019-01-01 | End: 2019-01-01

## 2019-01-01 RX ORDER — BACITRACIN 500 [USP'U]/G
500 OINTMENT OPHTHALMIC
Qty: 4 | Refills: 0 | Status: DISCONTINUED | COMMUNITY
Start: 2019-01-10 | End: 2019-01-01

## 2019-01-01 RX ORDER — HUMAN INSULIN 100 [IU]/ML
8 INJECTION, SUSPENSION SUBCUTANEOUS EVERY 8 HOURS
Refills: 0 | Status: DISCONTINUED | OUTPATIENT
Start: 2019-01-01 | End: 2019-01-01

## 2019-01-01 RX ORDER — INSULIN LISPRO 100/ML
0 VIAL (ML) SUBCUTANEOUS
Qty: 0 | Refills: 0 | DISCHARGE
Start: 2019-01-01

## 2019-01-01 RX ORDER — HYDRALAZINE HCL 50 MG
25 TABLET ORAL EVERY 8 HOURS
Refills: 0 | Status: DISCONTINUED | OUTPATIENT
Start: 2019-01-01 | End: 2019-01-01

## 2019-01-01 RX ORDER — SIMVASTATIN 20 MG/1
20 TABLET, FILM COATED ORAL
Qty: 90 | Refills: 2 | Status: ACTIVE | COMMUNITY

## 2019-01-01 RX ORDER — LEVOTHYROXINE SODIUM 125 MCG
1 TABLET ORAL
Qty: 0 | Refills: 0 | DISCHARGE

## 2019-01-01 RX ORDER — PANTOPRAZOLE SODIUM 20 MG/1
40 TABLET, DELAYED RELEASE ORAL DAILY
Refills: 0 | Status: DISCONTINUED | OUTPATIENT
Start: 2019-01-01 | End: 2019-01-01

## 2019-01-01 RX ORDER — METOPROLOL TARTRATE 50 MG
5 TABLET ORAL ONCE
Refills: 0 | Status: COMPLETED | OUTPATIENT
Start: 2019-01-01 | End: 2019-01-01

## 2019-01-01 RX ORDER — METOPROLOL TARTRATE 50 MG
1 TABLET ORAL
Qty: 0 | Refills: 0 | DISCHARGE
Start: 2019-01-01

## 2019-01-01 RX ORDER — LEVETIRACETAM 1000 MG/1
1000 TABLET, FILM COATED ORAL AT BEDTIME
Qty: 180 | Refills: 0 | Status: DISCONTINUED | COMMUNITY
Start: 2017-10-23 | End: 2019-01-01

## 2019-01-01 RX ORDER — DULOXETINE HYDROCHLORIDE 30 MG/1
60 CAPSULE, DELAYED RELEASE ORAL DAILY
Refills: 0 | Status: DISCONTINUED | OUTPATIENT
Start: 2019-01-01 | End: 2019-01-01

## 2019-01-01 RX ORDER — LEVETIRACETAM 250 MG/1
500 TABLET, FILM COATED ORAL EVERY 12 HOURS
Refills: 0 | Status: DISCONTINUED | OUTPATIENT
Start: 2019-01-01 | End: 2019-01-01

## 2019-01-01 RX ORDER — PANTOPRAZOLE SODIUM 20 MG/1
40 TABLET, DELAYED RELEASE ORAL
Refills: 0 | Status: DISCONTINUED | OUTPATIENT
Start: 2019-01-01 | End: 2019-01-01

## 2019-01-01 RX ORDER — TRAMADOL HYDROCHLORIDE 50 MG/1
25 TABLET ORAL EVERY 4 HOURS
Refills: 0 | Status: DISCONTINUED | OUTPATIENT
Start: 2019-01-01 | End: 2019-01-01

## 2019-01-01 RX ORDER — INSULIN GLARGINE 100 [IU]/ML
13 INJECTION, SOLUTION SUBCUTANEOUS AT BEDTIME
Refills: 0 | Status: DISCONTINUED | OUTPATIENT
Start: 2019-01-01 | End: 2019-01-01

## 2019-01-01 RX ORDER — ENOXAPARIN SODIUM 100 MG/ML
40 INJECTION SUBCUTANEOUS
Qty: 0 | Refills: 0 | DISCHARGE
Start: 2019-01-01

## 2019-01-01 RX ORDER — ALTEPLASE 100 MG
2 KIT INTRAVENOUS ONCE
Refills: 0 | Status: DISCONTINUED | OUTPATIENT
Start: 2019-01-01 | End: 2019-01-01

## 2019-01-01 RX ORDER — ENOXAPARIN SODIUM 100 MG/ML
40 INJECTION SUBCUTANEOUS
Refills: 0 | Status: DISCONTINUED | OUTPATIENT
Start: 2019-01-01 | End: 2019-01-01

## 2019-01-01 RX ORDER — INSULIN LISPRO 100/ML
VIAL (ML) SUBCUTANEOUS EVERY 6 HOURS
Refills: 0 | Status: DISCONTINUED | OUTPATIENT
Start: 2019-01-01 | End: 2019-01-01

## 2019-01-01 RX ORDER — LOPERAMIDE HYDROCHLORIDE 2 MG/1
2 CAPSULE ORAL
Qty: 120 | Refills: 0 | Status: DISCONTINUED | COMMUNITY
Start: 2018-11-27 | End: 2019-01-01

## 2019-01-01 RX ORDER — LIRAGLUTIDE 6 MG/ML
18 INJECTION SUBCUTANEOUS
Qty: 54 | Refills: 1 | Status: DISCONTINUED | COMMUNITY
Start: 2017-01-26 | End: 2019-01-01

## 2019-01-01 RX ORDER — DOXAZOSIN 2 MG/1
2 TABLET ORAL
Refills: 0 | Status: DISCONTINUED | COMMUNITY
End: 2019-01-01

## 2019-01-01 RX ORDER — MEMANTINE HYDROCHLORIDE 5 MG/1
5 TABLET, FILM COATED ORAL
Qty: 90 | Refills: 2 | Status: DISCONTINUED | COMMUNITY
Start: 2019-01-01 | End: 2019-01-01

## 2019-01-01 RX ORDER — SODIUM CHLORIDE 9 MG/ML
10 INJECTION INTRAMUSCULAR; INTRAVENOUS; SUBCUTANEOUS
Refills: 0 | Status: DISCONTINUED | OUTPATIENT
Start: 2019-01-01 | End: 2019-01-01

## 2019-01-01 RX ORDER — DOXAZOSIN MESYLATE 4 MG
2 TABLET ORAL AT BEDTIME
Refills: 0 | Status: DISCONTINUED | OUTPATIENT
Start: 2019-01-01 | End: 2019-01-01

## 2019-01-01 RX ORDER — POLYETHYLENE GLYCOL 400 AND PROPYLENE GLYCOL 4; 3 MG/ML; MG/ML
0.4-0.3 GEL OPHTHALMIC
Refills: 0 | Status: DISCONTINUED | COMMUNITY
Start: 2018-06-06 | End: 2019-01-01

## 2019-01-01 RX ORDER — ACETAMINOPHEN 500 MG
650 TABLET ORAL EVERY 6 HOURS
Refills: 0 | Status: DISCONTINUED | OUTPATIENT
Start: 2019-01-01 | End: 2019-01-01

## 2019-01-01 RX ORDER — LOPERAMIDE HCL 2 MG
2 TABLET ORAL DAILY
Refills: 0 | Status: DISCONTINUED | OUTPATIENT
Start: 2019-01-01 | End: 2019-01-01

## 2019-01-01 RX ORDER — SENNOSIDES 8.6 MG TABLETS 8.6 MG/1
8.6 TABLET ORAL
Refills: 0 | Status: DISCONTINUED | COMMUNITY
End: 2019-01-01

## 2019-01-01 RX ORDER — SODIUM CHLORIDE 9 MG/ML
500 INJECTION INTRAMUSCULAR; INTRAVENOUS; SUBCUTANEOUS ONCE
Refills: 0 | Status: DISCONTINUED | OUTPATIENT
Start: 2019-01-01 | End: 2019-01-01

## 2019-01-01 RX ORDER — PREGABALIN 100 MG/1
100 CAPSULE ORAL
Qty: 60 | Refills: 0 | Status: DISCONTINUED | COMMUNITY
Start: 2019-01-01 | End: 2019-01-01

## 2019-01-01 RX ORDER — LOPERAMIDE HCL 2 MG
1 TABLET ORAL
Qty: 0 | Refills: 0 | DISCHARGE

## 2019-01-01 RX ORDER — BACITRACIN 500 [USP'U]/G
0 OINTMENT OPHTHALMIC
Qty: 0 | Refills: 0 | DISCHARGE

## 2019-01-01 RX ORDER — METOPROLOL TARTRATE 50 MG
25 TABLET ORAL
Refills: 0 | Status: DISCONTINUED | OUTPATIENT
Start: 2019-01-01 | End: 2019-01-01

## 2019-01-01 RX ORDER — SENNA PLUS 8.6 MG/1
2 TABLET ORAL AT BEDTIME
Refills: 0 | Status: DISCONTINUED | OUTPATIENT
Start: 2019-01-01 | End: 2019-01-01

## 2019-01-01 RX ORDER — GLUCAGON INJECTION, SOLUTION 0.5 MG/.1ML
1 INJECTION, SOLUTION SUBCUTANEOUS ONCE
Refills: 0 | Status: DISCONTINUED | OUTPATIENT
Start: 2019-01-01 | End: 2019-01-01

## 2019-01-01 RX ORDER — MELATONIN 5 MG
5 CAPSULE ORAL
Qty: 30 | Refills: 2 | Status: DISCONTINUED | COMMUNITY
Start: 2019-01-01 | End: 2019-01-01

## 2019-01-01 RX ORDER — PEN NEEDLE, DIABETIC 29 G X1/2"
32G X 4 MM NEEDLE, DISPOSABLE MISCELLANEOUS
Qty: 200 | Refills: 3 | Status: DISCONTINUED | COMMUNITY
Start: 2017-05-03 | End: 2019-01-01

## 2019-01-01 RX ORDER — LIRAGLUTIDE 6 MG/ML
0 INJECTION SUBCUTANEOUS
Qty: 0 | Refills: 0 | DISCHARGE

## 2019-01-01 RX ORDER — TRAMADOL HYDROCHLORIDE 50 MG/1
0.5 TABLET ORAL
Qty: 0 | Refills: 0 | DISCHARGE
Start: 2019-01-01

## 2019-01-01 RX ORDER — ACETAMINOPHEN 500 MG
1000 TABLET ORAL ONCE
Refills: 0 | Status: COMPLETED | OUTPATIENT
Start: 2019-01-01 | End: 2019-01-01

## 2019-01-01 RX ORDER — CALCIUM GLUCONATE 100 MG/ML
2 VIAL (ML) INTRAVENOUS ONCE
Refills: 0 | Status: COMPLETED | OUTPATIENT
Start: 2019-01-01 | End: 2019-01-01

## 2019-01-01 RX ORDER — METOPROLOL TARTRATE 50 MG
0.5 TABLET ORAL
Qty: 0 | Refills: 0 | DISCHARGE

## 2019-01-01 RX ORDER — ACETAMINOPHEN 325 MG/1
325 TABLET ORAL
Refills: 0 | Status: DISCONTINUED | COMMUNITY
End: 2019-01-01

## 2019-01-01 RX ORDER — AMLODIPINE BESYLATE 2.5 MG/1
1 TABLET ORAL
Qty: 0 | Refills: 0 | DISCHARGE

## 2019-01-01 RX ORDER — CHLORHEXIDINE GLUCONATE 213 G/1000ML
1 SOLUTION TOPICAL
Refills: 0 | Status: DISCONTINUED | OUTPATIENT
Start: 2019-01-01 | End: 2019-01-01

## 2019-01-01 RX ORDER — DULOXETINE HYDROCHLORIDE 30 MG/1
1 CAPSULE, DELAYED RELEASE ORAL
Qty: 0 | Refills: 0 | DISCHARGE

## 2019-01-01 RX ORDER — LEVETIRACETAM 250 MG/1
750 TABLET, FILM COATED ORAL EVERY 12 HOURS
Refills: 0 | Status: DISCONTINUED | OUTPATIENT
Start: 2019-01-01 | End: 2019-01-01

## 2019-01-01 RX ORDER — DEXAMETHASONE 0.5 MG/5ML
2 ELIXIR ORAL EVERY 8 HOURS
Refills: 0 | Status: DISCONTINUED | OUTPATIENT
Start: 2019-01-01 | End: 2019-01-01

## 2019-01-01 RX ORDER — SIMETHICONE 125 MG
125 CAPSULE ORAL
Qty: 120 | Refills: 2 | Status: DISCONTINUED | COMMUNITY
Start: 2018-06-20 | End: 2019-01-01

## 2019-01-01 RX ORDER — LEVETIRACETAM 250 MG/1
750 TABLET, FILM COATED ORAL
Refills: 0 | Status: DISCONTINUED | OUTPATIENT
Start: 2019-01-01 | End: 2019-01-01

## 2019-01-01 RX ORDER — CHLORHEXIDINE GLUCONATE 213 G/1000ML
1 SOLUTION TOPICAL ONCE
Refills: 0 | Status: DISCONTINUED | OUTPATIENT
Start: 2019-01-01 | End: 2019-01-01

## 2019-01-01 RX ORDER — INSULIN LISPRO 100/ML
4 VIAL (ML) SUBCUTANEOUS
Refills: 0 | Status: DISCONTINUED | OUTPATIENT
Start: 2019-01-01 | End: 2019-01-01

## 2019-01-01 RX ORDER — DEXTROSE 50 % IN WATER 50 %
25 SYRINGE (ML) INTRAVENOUS ONCE
Refills: 0 | Status: DISCONTINUED | OUTPATIENT
Start: 2019-01-01 | End: 2019-01-01

## 2019-01-01 RX ORDER — DEXAMETHASONE 0.5 MG/5ML
2 ELIXIR ORAL EVERY 12 HOURS
Refills: 0 | Status: DISCONTINUED | OUTPATIENT
Start: 2019-01-01 | End: 2019-01-01

## 2019-01-01 RX ORDER — DEXAMETHASONE 0.5 MG/5ML
1 ELIXIR ORAL
Qty: 0 | Refills: 0 | DISCHARGE
Start: 2019-01-01

## 2019-01-01 RX ORDER — POLYETHYLENE GLYCOL 3350 17 G/17G
17 POWDER, FOR SOLUTION ORAL
Qty: 0 | Refills: 0 | DISCHARGE
Start: 2019-01-01

## 2019-01-01 RX ORDER — LOPERAMIDE HYDROCHLORIDE 2 MG/1
2 TABLET ORAL
Refills: 0 | Status: DISCONTINUED | COMMUNITY
Start: 2019-01-01 | End: 2019-01-01

## 2019-01-01 RX ORDER — MAGNESIUM SULFATE 500 MG/ML
1 VIAL (ML) INJECTION ONCE
Refills: 0 | Status: COMPLETED | OUTPATIENT
Start: 2019-01-01 | End: 2019-01-01

## 2019-01-01 RX ORDER — OFLOXACIN 0.3 %
1 DROPS OPHTHALMIC (EYE)
Qty: 0 | Refills: 0 | DISCHARGE

## 2019-01-01 RX ORDER — POTASSIUM CHLORIDE 20 MEQ
40 PACKET (EA) ORAL ONCE
Refills: 0 | Status: COMPLETED | OUTPATIENT
Start: 2019-01-01 | End: 2019-01-01

## 2019-01-01 RX ORDER — ENOXAPARIN SODIUM 100 MG/ML
40 INJECTION SUBCUTANEOUS
Refills: 0 | Status: DISCONTINUED | COMMUNITY
End: 2019-01-01

## 2019-01-01 RX ORDER — DEXTROSE 50 % IN WATER 50 %
12.5 SYRINGE (ML) INTRAVENOUS ONCE
Refills: 0 | Status: DISCONTINUED | OUTPATIENT
Start: 2019-01-01 | End: 2019-01-01

## 2019-01-01 RX ORDER — ERGOCALCIFEROL 1.25 MG/1
1.25 MG CAPSULE ORAL
Qty: 12 | Refills: 0 | Status: DISCONTINUED | COMMUNITY
Start: 2019-01-01 | End: 2019-01-01

## 2019-01-01 RX ORDER — INSULIN LISPRO 100/ML
VIAL (ML) SUBCUTANEOUS AT BEDTIME
Refills: 0 | Status: DISCONTINUED | OUTPATIENT
Start: 2019-01-01 | End: 2019-01-01

## 2019-01-01 RX ORDER — MUPIROCIN 20 MG/G
2 OINTMENT TOPICAL
Qty: 1 | Refills: 0 | Status: DISCONTINUED | COMMUNITY
Start: 2019-01-01 | End: 2019-01-01

## 2019-01-01 RX ORDER — PREGABALIN 75 MG/1
75 CAPSULE ORAL DAILY
Qty: 30 | Refills: 0 | Status: DISCONTINUED | COMMUNITY
Start: 2017-12-01 | End: 2019-01-01

## 2019-01-01 RX ORDER — GABAPENTIN 400 MG/1
2 CAPSULE ORAL
Qty: 0 | Refills: 0 | DISCHARGE

## 2019-01-01 RX ORDER — LIDOCAINE HCL 20 MG/ML
0.2 VIAL (ML) INJECTION ONCE
Refills: 0 | Status: DISCONTINUED | OUTPATIENT
Start: 2019-01-01 | End: 2019-01-01

## 2019-01-01 RX ORDER — LEVOTHYROXINE SODIUM 0.05 MG/1
50 TABLET ORAL DAILY
Qty: 90 | Refills: 1 | Status: ACTIVE | COMMUNITY
Start: 2017-03-01 | End: 1900-01-01

## 2019-01-01 RX ORDER — CEFAZOLIN SODIUM 1 G
2000 VIAL (EA) INJECTION ONCE
Refills: 0 | Status: DISCONTINUED | OUTPATIENT
Start: 2019-01-01 | End: 2019-01-01

## 2019-01-01 RX ORDER — INSULIN LISPRO 100/ML
VIAL (ML) SUBCUTANEOUS
Refills: 0 | Status: DISCONTINUED | OUTPATIENT
Start: 2019-01-01 | End: 2019-01-01

## 2019-01-01 RX ORDER — LEVOTHYROXINE SODIUM 125 MCG
25 TABLET ORAL AT BEDTIME
Refills: 0 | Status: DISCONTINUED | OUTPATIENT
Start: 2019-01-01 | End: 2019-01-01

## 2019-01-01 RX ORDER — DONEPEZIL HYDROCHLORIDE 5 MG/1
5 TABLET ORAL
Qty: 90 | Refills: 0 | Status: DISCONTINUED | COMMUNITY
Start: 2019-01-01 | End: 2019-01-01

## 2019-01-01 RX ORDER — ENOXAPARIN SODIUM 100 MG/ML
13 INJECTION SUBCUTANEOUS
Qty: 0 | Refills: 0 | DISCHARGE

## 2019-01-01 RX ORDER — INSULIN GLARGINE 100 [IU]/ML
100 INJECTION, SOLUTION SUBCUTANEOUS
Refills: 0 | Status: DISCONTINUED | COMMUNITY
End: 2019-01-01

## 2019-01-01 RX ORDER — LANCETS 33 GAUGE
EACH MISCELLANEOUS
Qty: 400 | Refills: 3 | Status: ACTIVE | COMMUNITY
Start: 2017-07-29

## 2019-01-01 RX ORDER — OFLOXACIN OTIC 3 MG/ML
0.3 SOLUTION AURICULAR (OTIC) TWICE DAILY
Qty: 1 | Refills: 0 | Status: DISCONTINUED | COMMUNITY
Start: 2019-01-01 | End: 2019-01-01

## 2019-01-01 RX ORDER — SODIUM CHLORIDE 9 MG/ML
3 INJECTION INTRAMUSCULAR; INTRAVENOUS; SUBCUTANEOUS EVERY 8 HOURS
Refills: 0 | Status: DISCONTINUED | OUTPATIENT
Start: 2019-01-01 | End: 2019-01-01

## 2019-01-01 RX ORDER — DEXAMETHASONE 0.5 MG/5ML
4 ELIXIR ORAL EVERY 6 HOURS
Refills: 0 | Status: DISCONTINUED | OUTPATIENT
Start: 2019-01-01 | End: 2019-01-01

## 2019-01-01 RX ORDER — LABETALOL HCL 100 MG
5 TABLET ORAL ONCE
Refills: 0 | Status: COMPLETED | OUTPATIENT
Start: 2019-01-01 | End: 2019-01-01

## 2019-01-01 RX ORDER — SIMVASTATIN 20 MG/1
1 TABLET, FILM COATED ORAL
Qty: 0 | Refills: 0 | DISCHARGE

## 2019-01-01 RX ORDER — NIFEDIPINE 30 MG
60 TABLET, EXTENDED RELEASE 24 HR ORAL DAILY
Refills: 0 | Status: DISCONTINUED | OUTPATIENT
Start: 2019-01-01 | End: 2019-01-01

## 2019-01-01 RX ORDER — BLOOD-GLUCOSE METER
EACH MISCELLANEOUS
Qty: 60 | Refills: 5 | Status: DISCONTINUED | COMMUNITY
Start: 2018-07-19 | End: 2019-01-01

## 2019-01-01 RX ORDER — PANTOPRAZOLE SODIUM 20 MG/1
1 TABLET, DELAYED RELEASE ORAL
Qty: 0 | Refills: 0 | DISCHARGE

## 2019-01-01 RX ORDER — SODIUM CHLORIDE 9 MG/ML
1000 INJECTION INTRAMUSCULAR; INTRAVENOUS; SUBCUTANEOUS
Refills: 0 | Status: DISCONTINUED | OUTPATIENT
Start: 2019-01-01 | End: 2019-01-01

## 2019-01-01 RX ORDER — LIRAGLUTIDE 6 MG/ML
1.2 INJECTION SUBCUTANEOUS
Qty: 0 | Refills: 0 | DISCHARGE

## 2019-01-01 RX ORDER — POTASSIUM CHLORIDE 20 MEQ
40 PACKET (EA) ORAL EVERY 4 HOURS
Refills: 0 | Status: COMPLETED | OUTPATIENT
Start: 2019-01-01 | End: 2019-01-01

## 2019-01-01 RX ORDER — DEXAMETHASONE 0.5 MG/5ML
3 ELIXIR ORAL EVERY 8 HOURS
Refills: 0 | Status: DISCONTINUED | OUTPATIENT
Start: 2019-01-01 | End: 2019-01-01

## 2019-01-01 RX ORDER — INSULIN GLARGINE 100 [IU]/ML
100 INJECTION, SOLUTION SUBCUTANEOUS
Qty: 5 | Refills: 5 | Status: DISCONTINUED | COMMUNITY
Start: 2018-06-06 | End: 2019-01-01

## 2019-01-01 RX ORDER — LIRAGLUTIDE 6 MG/ML
18 INJECTION SUBCUTANEOUS
Refills: 0 | Status: ACTIVE | COMMUNITY

## 2019-01-01 RX ORDER — HUMAN INSULIN 100 [IU]/ML
8 INJECTION, SUSPENSION SUBCUTANEOUS EVERY 6 HOURS
Refills: 0 | Status: DISCONTINUED | OUTPATIENT
Start: 2019-01-01 | End: 2019-01-01

## 2019-01-01 RX ORDER — DEXAMETHASONE 1 MG/1
1 TABLET ORAL
Qty: 1 | Refills: 0 | Status: DISCONTINUED | COMMUNITY
Start: 2019-02-13 | End: 2019-01-01

## 2019-01-01 RX ORDER — INSULIN LISPRO 100 U/ML
100 INJECTION, SOLUTION INTRAVENOUS; SUBCUTANEOUS
Refills: 0 | Status: DISCONTINUED | COMMUNITY
End: 2019-01-01

## 2019-01-01 RX ORDER — ACETAMINOPHEN 500 MG
2 TABLET ORAL
Qty: 0 | Refills: 0 | DISCHARGE
Start: 2019-01-01

## 2019-01-01 RX ADMIN — Medication 50 MICROGRAM(S): at 23:04

## 2019-01-01 RX ADMIN — SIMVASTATIN 20 MILLIGRAM(S): 20 TABLET, FILM COATED ORAL at 22:06

## 2019-01-01 RX ADMIN — HUMAN INSULIN 8 UNIT(S): 100 INJECTION, SUSPENSION SUBCUTANEOUS at 06:46

## 2019-01-01 RX ADMIN — TRAMADOL HYDROCHLORIDE 25 MILLIGRAM(S): 50 TABLET ORAL at 23:27

## 2019-01-01 RX ADMIN — Medication 4 UNIT(S): at 13:00

## 2019-01-01 RX ADMIN — TRAMADOL HYDROCHLORIDE 25 MILLIGRAM(S): 50 TABLET ORAL at 21:09

## 2019-01-01 RX ADMIN — Medication 5 MILLIGRAM(S): at 21:45

## 2019-01-01 RX ADMIN — AMLODIPINE BESYLATE 5 MILLIGRAM(S): 2.5 TABLET ORAL at 05:08

## 2019-01-01 RX ADMIN — Medication 2 MILLIGRAM(S): at 22:06

## 2019-01-01 RX ADMIN — Medication 1000 MILLIGRAM(S): at 03:25

## 2019-01-01 RX ADMIN — TRAMADOL HYDROCHLORIDE 25 MILLIGRAM(S): 50 TABLET ORAL at 05:23

## 2019-01-01 RX ADMIN — LEVETIRACETAM 400 MILLIGRAM(S): 250 TABLET, FILM COATED ORAL at 10:25

## 2019-01-01 RX ADMIN — Medication 2 MILLIGRAM(S): at 21:50

## 2019-01-01 RX ADMIN — Medication 4: at 00:13

## 2019-01-01 RX ADMIN — Medication 2 MILLIGRAM(S): at 23:04

## 2019-01-01 RX ADMIN — Medication 4 MILLIGRAM(S): at 05:17

## 2019-01-01 RX ADMIN — Medication 25 MILLIGRAM(S): at 13:00

## 2019-01-01 RX ADMIN — Medication 2: at 00:06

## 2019-01-01 RX ADMIN — Medication 650 MILLIGRAM(S): at 16:00

## 2019-01-01 RX ADMIN — HUMAN INSULIN 8 UNIT(S): 100 INJECTION, SUSPENSION SUBCUTANEOUS at 00:06

## 2019-01-01 RX ADMIN — Medication 25 MILLIGRAM(S): at 21:22

## 2019-01-01 RX ADMIN — INSULIN GLARGINE 13 UNIT(S): 100 INJECTION, SOLUTION SUBCUTANEOUS at 21:24

## 2019-01-01 RX ADMIN — CHLORHEXIDINE GLUCONATE 1 APPLICATION(S): 213 SOLUTION TOPICAL at 22:07

## 2019-01-01 RX ADMIN — TRAMADOL HYDROCHLORIDE 25 MILLIGRAM(S): 50 TABLET ORAL at 06:23

## 2019-01-01 RX ADMIN — Medication 0: at 22:22

## 2019-01-01 RX ADMIN — Medication 100 MILLIGRAM(S): at 07:03

## 2019-01-01 RX ADMIN — HUMAN INSULIN 10 UNIT(S): 100 INJECTION, SUSPENSION SUBCUTANEOUS at 00:16

## 2019-01-01 RX ADMIN — Medication 4: at 12:30

## 2019-01-01 RX ADMIN — Medication 25 MICROGRAM(S): at 22:30

## 2019-01-01 RX ADMIN — POLYETHYLENE GLYCOL 3350 17 GRAM(S): 17 POWDER, FOR SOLUTION ORAL at 06:46

## 2019-01-01 RX ADMIN — Medication 2 MILLIGRAM(S): at 08:59

## 2019-01-01 RX ADMIN — PANTOPRAZOLE SODIUM 40 MILLIGRAM(S): 20 TABLET, DELAYED RELEASE ORAL at 13:08

## 2019-01-01 RX ADMIN — ATORVASTATIN CALCIUM 20 MILLIGRAM(S): 80 TABLET, FILM COATED ORAL at 21:39

## 2019-01-01 RX ADMIN — Medication 4 MILLIGRAM(S): at 05:09

## 2019-01-01 RX ADMIN — AMLODIPINE BESYLATE 5 MILLIGRAM(S): 2.5 TABLET ORAL at 05:17

## 2019-01-01 RX ADMIN — PANTOPRAZOLE SODIUM 40 MILLIGRAM(S): 20 TABLET, DELAYED RELEASE ORAL at 12:15

## 2019-01-01 RX ADMIN — TRAMADOL HYDROCHLORIDE 25 MILLIGRAM(S): 50 TABLET ORAL at 10:11

## 2019-01-01 RX ADMIN — AMLODIPINE BESYLATE 5 MILLIGRAM(S): 2.5 TABLET ORAL at 06:46

## 2019-01-01 RX ADMIN — SIMVASTATIN 20 MILLIGRAM(S): 20 TABLET, FILM COATED ORAL at 21:49

## 2019-01-01 RX ADMIN — LEVETIRACETAM 750 MILLIGRAM(S): 250 TABLET, FILM COATED ORAL at 17:37

## 2019-01-01 RX ADMIN — Medication 100 MILLIGRAM(S): at 17:35

## 2019-01-01 RX ADMIN — SENNA PLUS 2 TABLET(S): 8.6 TABLET ORAL at 21:24

## 2019-01-01 RX ADMIN — INSULIN GLARGINE 13 UNIT(S): 100 INJECTION, SOLUTION SUBCUTANEOUS at 22:22

## 2019-01-01 RX ADMIN — POLYETHYLENE GLYCOL 3350 17 GRAM(S): 17 POWDER, FOR SOLUTION ORAL at 06:07

## 2019-01-01 RX ADMIN — Medication 40 MILLIEQUIVALENT(S): at 06:46

## 2019-01-01 RX ADMIN — Medication 2: at 06:26

## 2019-01-01 RX ADMIN — TRAMADOL HYDROCHLORIDE 25 MILLIGRAM(S): 50 TABLET ORAL at 12:53

## 2019-01-01 RX ADMIN — TRAMADOL HYDROCHLORIDE 25 MILLIGRAM(S): 50 TABLET ORAL at 22:43

## 2019-01-01 RX ADMIN — HUMAN INSULIN 10 UNIT(S): 100 INJECTION, SUSPENSION SUBCUTANEOUS at 12:29

## 2019-01-01 RX ADMIN — POLYETHYLENE GLYCOL 3350 17 GRAM(S): 17 POWDER, FOR SOLUTION ORAL at 05:17

## 2019-01-01 RX ADMIN — Medication 2 MILLIGRAM(S): at 13:00

## 2019-01-01 RX ADMIN — TRAMADOL HYDROCHLORIDE 25 MILLIGRAM(S): 50 TABLET ORAL at 21:23

## 2019-01-01 RX ADMIN — Medication 4 MILLIGRAM(S): at 21:50

## 2019-01-01 RX ADMIN — Medication 50 MICROGRAM(S): at 06:58

## 2019-01-01 RX ADMIN — Medication 4: at 06:26

## 2019-01-01 RX ADMIN — ENOXAPARIN SODIUM 40 MILLIGRAM(S): 100 INJECTION SUBCUTANEOUS at 17:45

## 2019-01-01 RX ADMIN — TRAMADOL HYDROCHLORIDE 25 MILLIGRAM(S): 50 TABLET ORAL at 20:14

## 2019-01-01 RX ADMIN — Medication 5 MILLIGRAM(S): at 00:16

## 2019-01-01 RX ADMIN — Medication 4 UNIT(S): at 08:41

## 2019-01-01 RX ADMIN — Medication 2: at 23:17

## 2019-01-01 RX ADMIN — PANTOPRAZOLE SODIUM 40 MILLIGRAM(S): 20 TABLET, DELAYED RELEASE ORAL at 06:58

## 2019-01-01 RX ADMIN — Medication 4 MILLIGRAM(S): at 06:04

## 2019-01-01 RX ADMIN — Medication 40 MILLIEQUIVALENT(S): at 04:13

## 2019-01-01 RX ADMIN — POLYETHYLENE GLYCOL 3350 17 GRAM(S): 17 POWDER, FOR SOLUTION ORAL at 17:09

## 2019-01-01 RX ADMIN — LEVETIRACETAM 750 MILLIGRAM(S): 250 TABLET, FILM COATED ORAL at 07:03

## 2019-01-01 RX ADMIN — HUMAN INSULIN 10 UNIT(S): 100 INJECTION, SUSPENSION SUBCUTANEOUS at 06:26

## 2019-01-01 RX ADMIN — SIMVASTATIN 20 MILLIGRAM(S): 20 TABLET, FILM COATED ORAL at 21:22

## 2019-01-01 RX ADMIN — HUMAN INSULIN 10 UNIT(S): 100 INJECTION, SUSPENSION SUBCUTANEOUS at 17:46

## 2019-01-01 RX ADMIN — PANTOPRAZOLE SODIUM 40 MILLIGRAM(S): 20 TABLET, DELAYED RELEASE ORAL at 12:28

## 2019-01-01 RX ADMIN — HUMAN INSULIN 10 UNIT(S): 100 INJECTION, SUSPENSION SUBCUTANEOUS at 23:17

## 2019-01-01 RX ADMIN — Medication 400 MILLIGRAM(S): at 02:51

## 2019-01-01 RX ADMIN — Medication 50 MICROGRAM(S): at 05:09

## 2019-01-01 RX ADMIN — CHLORHEXIDINE GLUCONATE 1 APPLICATION(S): 213 SOLUTION TOPICAL at 22:52

## 2019-01-01 RX ADMIN — Medication 25 MILLIGRAM(S): at 17:17

## 2019-01-01 RX ADMIN — SENNA PLUS 2 TABLET(S): 8.6 TABLET ORAL at 21:50

## 2019-01-01 RX ADMIN — Medication 200 GRAM(S): at 03:11

## 2019-01-01 RX ADMIN — LEVETIRACETAM 750 MILLIGRAM(S): 250 TABLET, FILM COATED ORAL at 09:00

## 2019-01-01 RX ADMIN — Medication 4 MILLIGRAM(S): at 06:46

## 2019-01-01 RX ADMIN — HUMAN INSULIN 10 UNIT(S): 100 INJECTION, SUSPENSION SUBCUTANEOUS at 13:08

## 2019-01-01 RX ADMIN — Medication 4: at 12:29

## 2019-01-01 RX ADMIN — Medication 1000 MILLIGRAM(S): at 20:30

## 2019-01-01 RX ADMIN — Medication 6: at 13:09

## 2019-01-01 RX ADMIN — Medication 25 MILLIGRAM(S): at 05:08

## 2019-01-01 RX ADMIN — LEVETIRACETAM 400 MILLIGRAM(S): 250 TABLET, FILM COATED ORAL at 22:07

## 2019-01-01 RX ADMIN — Medication 4: at 11:11

## 2019-01-01 RX ADMIN — POLYETHYLENE GLYCOL 3350 17 GRAM(S): 17 POWDER, FOR SOLUTION ORAL at 05:09

## 2019-01-01 RX ADMIN — TRAMADOL HYDROCHLORIDE 25 MILLIGRAM(S): 50 TABLET ORAL at 15:46

## 2019-01-01 RX ADMIN — Medication 100 MILLIGRAM(S): at 06:04

## 2019-01-01 RX ADMIN — Medication 100 MILLIGRAM(S): at 08:59

## 2019-01-01 RX ADMIN — Medication 4 UNIT(S): at 09:00

## 2019-01-01 RX ADMIN — Medication 50 MICROGRAM(S): at 06:04

## 2019-01-01 RX ADMIN — Medication 100 GRAM(S): at 04:14

## 2019-01-01 RX ADMIN — Medication 4 UNIT(S): at 19:39

## 2019-01-01 RX ADMIN — PANTOPRAZOLE SODIUM 40 MILLIGRAM(S): 20 TABLET, DELAYED RELEASE ORAL at 05:08

## 2019-01-01 RX ADMIN — Medication 4 MILLIGRAM(S): at 17:09

## 2019-01-01 RX ADMIN — Medication 2 MILLIGRAM(S): at 21:22

## 2019-01-01 RX ADMIN — TRAMADOL HYDROCHLORIDE 25 MILLIGRAM(S): 50 TABLET ORAL at 22:00

## 2019-01-01 RX ADMIN — TRAMADOL HYDROCHLORIDE 25 MILLIGRAM(S): 50 TABLET ORAL at 10:41

## 2019-01-01 RX ADMIN — Medication 25 MILLIGRAM(S): at 05:17

## 2019-01-01 RX ADMIN — Medication 4 MILLIGRAM(S): at 14:06

## 2019-01-01 RX ADMIN — ENOXAPARIN SODIUM 40 MILLIGRAM(S): 100 INJECTION SUBCUTANEOUS at 17:36

## 2019-01-01 RX ADMIN — LEVETIRACETAM 750 MILLIGRAM(S): 250 TABLET, FILM COATED ORAL at 06:04

## 2019-01-01 RX ADMIN — Medication 4 MILLIGRAM(S): at 11:03

## 2019-01-01 RX ADMIN — Medication 4 UNIT(S): at 13:20

## 2019-01-01 RX ADMIN — LEVETIRACETAM 750 MILLIGRAM(S): 250 TABLET, FILM COATED ORAL at 23:04

## 2019-01-01 RX ADMIN — Medication 650 MILLIGRAM(S): at 06:41

## 2019-01-01 RX ADMIN — SIMVASTATIN 20 MILLIGRAM(S): 20 TABLET, FILM COATED ORAL at 23:04

## 2019-01-01 RX ADMIN — Medication 60 MILLIGRAM(S): at 08:59

## 2019-01-01 RX ADMIN — Medication 100 MILLIGRAM(S): at 05:09

## 2019-01-01 RX ADMIN — Medication 4: at 19:39

## 2019-01-01 RX ADMIN — CHLORHEXIDINE GLUCONATE 1 APPLICATION(S): 213 SOLUTION TOPICAL at 23:05

## 2019-01-01 RX ADMIN — Medication 4: at 18:18

## 2019-01-01 RX ADMIN — HUMAN INSULIN 8 UNIT(S): 100 INJECTION, SUSPENSION SUBCUTANEOUS at 12:28

## 2019-01-01 RX ADMIN — Medication 25 MILLIGRAM(S): at 18:18

## 2019-01-01 RX ADMIN — LEVETIRACETAM 750 MILLIGRAM(S): 250 TABLET, FILM COATED ORAL at 05:09

## 2019-01-01 RX ADMIN — Medication 100 MILLIGRAM(S): at 18:17

## 2019-01-01 RX ADMIN — HUMAN INSULIN 10 UNIT(S): 100 INJECTION, SUSPENSION SUBCUTANEOUS at 17:42

## 2019-01-01 RX ADMIN — Medication 4: at 17:43

## 2019-01-01 RX ADMIN — Medication 4 MILLIGRAM(S): at 13:08

## 2019-01-01 RX ADMIN — Medication 400 MILLIGRAM(S): at 19:53

## 2019-01-01 RX ADMIN — Medication 4: at 17:46

## 2019-01-01 RX ADMIN — Medication 650 MILLIGRAM(S): at 17:00

## 2019-01-01 RX ADMIN — DULOXETINE HYDROCHLORIDE 60 MILLIGRAM(S): 30 CAPSULE, DELAYED RELEASE ORAL at 13:53

## 2019-01-01 RX ADMIN — Medication 100 MILLIGRAM(S): at 06:46

## 2019-01-01 RX ADMIN — Medication 40 MILLIEQUIVALENT(S): at 03:15

## 2019-01-01 RX ADMIN — Medication 2: at 13:01

## 2019-01-01 RX ADMIN — LEVETIRACETAM 750 MILLIGRAM(S): 250 TABLET, FILM COATED ORAL at 18:18

## 2019-01-01 RX ADMIN — Medication 4 MILLIGRAM(S): at 00:06

## 2019-01-01 RX ADMIN — LEVETIRACETAM 750 MILLIGRAM(S): 250 TABLET, FILM COATED ORAL at 21:22

## 2019-01-01 RX ADMIN — ENOXAPARIN SODIUM 40 MILLIGRAM(S): 100 INJECTION SUBCUTANEOUS at 21:22

## 2019-01-01 RX ADMIN — Medication 4 UNIT(S): at 18:18

## 2019-01-01 RX ADMIN — ENOXAPARIN SODIUM 40 MILLIGRAM(S): 100 INJECTION SUBCUTANEOUS at 18:18

## 2019-01-01 RX ADMIN — TRAMADOL HYDROCHLORIDE 25 MILLIGRAM(S): 50 TABLET ORAL at 16:20

## 2019-01-01 RX ADMIN — Medication 650 MILLIGRAM(S): at 06:05

## 2019-01-01 RX ADMIN — Medication 60 MILLIGRAM(S): at 06:59

## 2019-01-01 RX ADMIN — Medication 2: at 13:20

## 2019-01-01 RX ADMIN — TRAMADOL HYDROCHLORIDE 25 MILLIGRAM(S): 50 TABLET ORAL at 13:10

## 2019-01-01 RX ADMIN — Medication 100 MILLIGRAM(S): at 21:22

## 2019-01-01 RX ADMIN — Medication 2: at 06:16

## 2019-01-01 RX ADMIN — Medication 25 MILLIGRAM(S): at 06:58

## 2019-01-01 RX ADMIN — Medication 25 MICROGRAM(S): at 22:51

## 2019-01-01 RX ADMIN — Medication 2: at 17:29

## 2019-01-01 RX ADMIN — Medication 4 MILLIGRAM(S): at 23:04

## 2019-01-01 RX ADMIN — Medication 25 MILLIGRAM(S): at 17:35

## 2019-01-01 RX ADMIN — AMLODIPINE BESYLATE 5 MILLIGRAM(S): 2.5 TABLET ORAL at 06:31

## 2019-01-01 RX ADMIN — AMLODIPINE BESYLATE 5 MILLIGRAM(S): 2.5 TABLET ORAL at 05:09

## 2019-01-01 RX ADMIN — LEVETIRACETAM 400 MILLIGRAM(S): 250 TABLET, FILM COATED ORAL at 10:59

## 2019-01-01 RX ADMIN — Medication 25 MILLIGRAM(S): at 06:04

## 2019-01-01 RX ADMIN — SODIUM CHLORIDE 75 MILLILITER(S): 9 INJECTION INTRAMUSCULAR; INTRAVENOUS; SUBCUTANEOUS at 11:12

## 2019-01-01 RX ADMIN — Medication 200 GRAM(S): at 03:24

## 2019-01-01 RX ADMIN — Medication 4 MILLIGRAM(S): at 22:51

## 2019-01-01 RX ADMIN — Medication 4: at 00:17

## 2019-01-01 RX ADMIN — Medication 2 MILLIGRAM(S): at 06:57

## 2019-01-01 RX ADMIN — Medication 25 MILLIGRAM(S): at 06:46

## 2019-01-01 RX ADMIN — Medication 2 MILLIGRAM(S): at 21:26

## 2019-01-01 RX ADMIN — Medication 100 MILLIGRAM(S): at 17:47

## 2019-01-01 RX ADMIN — ENOXAPARIN SODIUM 40 MILLIGRAM(S): 100 INJECTION SUBCUTANEOUS at 17:17

## 2019-01-01 RX ADMIN — SENNA PLUS 2 TABLET(S): 8.6 TABLET ORAL at 22:07

## 2019-01-01 RX ADMIN — LEVETIRACETAM 400 MILLIGRAM(S): 250 TABLET, FILM COATED ORAL at 21:46

## 2019-01-01 RX ADMIN — Medication 100 MILLIGRAM(S): at 05:17

## 2019-01-01 RX ADMIN — Medication 25 MILLIGRAM(S): at 17:47

## 2019-01-01 RX ADMIN — Medication 2 MILLIGRAM(S): at 21:24

## 2019-01-01 RX ADMIN — Medication 4 MILLIGRAM(S): at 12:29

## 2019-01-01 RX ADMIN — POLYETHYLENE GLYCOL 3350 17 GRAM(S): 17 POWDER, FOR SOLUTION ORAL at 17:37

## 2019-01-01 RX ADMIN — Medication 2: at 06:45

## 2019-01-01 RX ADMIN — Medication 25 MILLIGRAM(S): at 05:09

## 2019-01-14 ENCOUNTER — FORM ENCOUNTER (OUTPATIENT)
Age: 79
End: 2019-01-14

## 2019-01-15 ENCOUNTER — APPOINTMENT (OUTPATIENT)
Dept: NUCLEAR MEDICINE | Facility: IMAGING CENTER | Age: 79
End: 2019-01-15
Payer: MEDICARE

## 2019-01-15 ENCOUNTER — OUTPATIENT (OUTPATIENT)
Dept: OUTPATIENT SERVICES | Facility: HOSPITAL | Age: 79
LOS: 1 days | End: 2019-01-15
Payer: MEDICARE

## 2019-01-15 DIAGNOSIS — S06.5X9A TRAUMATIC SUBDURAL HEMORRHAGE WITH LOSS OF CONSCIOUSNESS OF UNSPECIFIED DURATION, INITIAL ENCOUNTER: Chronic | ICD-10-CM

## 2019-01-15 DIAGNOSIS — E27.8 OTHER SPECIFIED DISORDERS OF ADRENAL GLAND: ICD-10-CM

## 2019-01-15 PROCEDURE — 78815 PET IMAGE W/CT SKULL-THIGH: CPT

## 2019-01-15 PROCEDURE — A9587: CPT

## 2019-01-15 PROCEDURE — 78815 PET IMAGE W/CT SKULL-THIGH: CPT | Mod: 26,PI

## 2019-01-16 ENCOUNTER — RX RENEWAL (OUTPATIENT)
Age: 79
End: 2019-01-16

## 2019-01-16 RX ORDER — PANTOPRAZOLE 40 MG/1
40 TABLET, DELAYED RELEASE ORAL
Qty: 90 | Refills: 3 | Status: ACTIVE | COMMUNITY
Start: 2017-11-02 | End: 1900-01-01

## 2019-01-17 LAB
CGA SERPL-MCNC: 148 NG/ML
GASTRIN SERPL-MCNC: 224 PG/ML
SEROTONIN BLD-MCNC: NORMAL NG/ML
VIP SERPL-MCNC: <50 PG/ML

## 2019-02-07 ENCOUNTER — RX RENEWAL (OUTPATIENT)
Age: 79
End: 2019-02-07

## 2019-02-08 LAB
DOPAMINE UR-MCNC: 126 UG/L
DOPAMINE, UR, 24HR: 265 UG/24 HR
EPINEPH UR-MCNC: 2 UG/L
EPINEPHRINE, U, 24HR: 4 UG/24 HR
NOREPINEPH UR-MCNC: 23 UG/L
NOREPINEPHRINE,U,24H: 48 UG/24 HR

## 2019-02-11 ENCOUNTER — APPOINTMENT (OUTPATIENT)
Dept: ENDOCRINOLOGY | Facility: CLINIC | Age: 79
End: 2019-02-11
Payer: MEDICARE

## 2019-02-11 ENCOUNTER — APPOINTMENT (OUTPATIENT)
Dept: GERIATRICS | Facility: CLINIC | Age: 79
End: 2019-02-11
Payer: MEDICARE

## 2019-02-11 VITALS
TEMPERATURE: 98 F | WEIGHT: 186 LBS | DIASTOLIC BLOOD PRESSURE: 64 MMHG | BODY MASS INDEX: 35.14 KG/M2 | SYSTOLIC BLOOD PRESSURE: 122 MMHG | HEART RATE: 76 BPM | OXYGEN SATURATION: 95 %

## 2019-02-11 VITALS
OXYGEN SATURATION: 99 % | DIASTOLIC BLOOD PRESSURE: 80 MMHG | WEIGHT: 186 LBS | BODY MASS INDEX: 35.12 KG/M2 | SYSTOLIC BLOOD PRESSURE: 120 MMHG | HEART RATE: 77 BPM | HEIGHT: 61 IN

## 2019-02-11 DIAGNOSIS — R29.6 REPEATED FALLS: ICD-10-CM

## 2019-02-11 PROCEDURE — 99215 OFFICE O/P EST HI 40 MIN: CPT

## 2019-02-11 PROCEDURE — 99213 OFFICE O/P EST LOW 20 MIN: CPT | Mod: GC

## 2019-02-11 NOTE — PHYSICAL EXAM
[Well Nourished] : well nourished [Well Developed] : well developed [Normal Hearing] : hearing was normal [No LAD] : no lymphadenopathy [Thyroid Not Enlarged] : the thyroid was not enlarged [No Thyroid Nodules] : there were no palpable thyroid nodules [No Accessory Muscle Use] : no accessory muscle use [Clear to Auscultation] : lungs were clear to auscultation bilaterally [Normal Rate] : heart rate was normal  [Normal S1, S2] : normal S1 and S2 [Regular Rhythm] : with a regular rhythm [Pedal Pulses Normal] : the pedal pulses are present [No Edema] : there was no peripheral edema [Not Tender] : non-tender [Soft] : abdomen soft [Not Distended] : not distended [Post Cervical Nodes] : posterior cervical nodes [Anterior Cervical Nodes] : anterior cervical nodes [No Spinal Tenderness] : no spinal tenderness [Cranial Nerves Intact] : cranial nerves 2-12 were intact [No Motor Deficits] : the motor exam was normal [No Tremors] : no tremors [de-identified] : Left eye is s/p surgery with bandages on.   [de-identified] : AMBULATES WTIH WALKER  [de-identified] : POOR RECENT MEMORY

## 2019-02-11 NOTE — DATA REVIEWED
[FreeTextEntry1] : \par EXAM: CT ABDOMEN AND PELVIS OC IC \par \par \par PROCEDURE DATE: 12/09/2018 \par \par \par \par INTERPRETATION: CLINICAL INFORMATION: Right upper quadrant pain. \par \par COMPARISON: None. \par \par PROCEDURE: \par CT of the Abdomen and Pelvis was performed with intravenous contrast. \par Intravenous contrast: 90 ml Omnipaque 350. 10 ml discarded. \par Oral contrast: positive contrast was administered. \par Sagittal and coronal reformats were performed. \par \par FINDINGS: \par \par LOWER CHEST: Within normal limits. \par \par LIVER: Within normal limits. \par BILE DUCTS: Normal caliber. \par GALLBLADDER: Status post cholecystectomy. \par SPLEEN: Within normal limits. \par PANCREAS: Within normal limits. \par ADRENALS: A 3 x 2.7 cm left adrenal nodule. Additional subcentimeter nodules \par bilaterally. \par KIDNEYS/URETERS: Subcentimeter hypodense left renal lesion is too small to \par characterize. \par \par BLADDER: Within normal limits. \par REPRODUCTIVE ORGANS: Hysterectomy. No adnexal mass. \par \par BOWEL: No bowel obstruction. The appendix is normal. \par PERITONEUM: No ascites. \par VESSELS: Within normal limits. \par RETROPERITONEUM: No lymphadenopathy. \par ABDOMINAL WALL: Within normal limits. \par BONES: Within normal limits. \par \par IMPRESSION: A 3 cm left adrenal mass. Additional subcentimeter adrenal \par nodules; findings are concerning for neoplastic disease. \par \par The findings were discussed with Dr. Gutiérrez 9:45 AM on 12 and 18. \par

## 2019-02-11 NOTE — ASSESSMENT
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 \par \par 3)HLD\par Continue with simvastatin \par \par 4)History of pituitary tumor\par Prolactin level had been stable\par \par 5) Hypothyroidism \par TSH normal on oct 2018, continue with thyroid medication at current does. \par \par 6)Adrenal incidentaloma\par Will check adrenal nodules, noel/renin. \par Will check salivary cortisol \par will do dexamethasone suppression test\par Will e-mail radiology for Hounsfield unit of the adrenal nodule. \par Given diarrhea and mildly elevated chromogranin, will also check 5HIAA to rule out carcinoid syndrome.  \par \par Diabetes Health Care Maintenance\par - Opthalmology up to date: Yes\par -Podiatry up to date: Yes\par -Antiplatelet agent: No\par  -Urine microalbumin: No recent labs. check today \par -ACE-I/ARB: No\par -Patient to call for persistent glucose < 70 or > 300\par -Discussed hypoglycemic protocol. \par -Yearly flu vaccine recommended \par \par EDUCATION: Reviewed 'ABC' of diabetes management (respective goals in parentheses): A1C (<7), blood pressure (<140/90), and cholesterol (LDL <100).\par \par Will follow up in 1 month for close follow up of adrenal nodule.  \par \par  \par .

## 2019-02-11 NOTE — HISTORY OF PRESENT ILLNESS
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 \par \par Her current DM regimen is: Victoza 1.2mg daily, Metformin 1500mg daily, (500 in the morning and 1000mg at night)\par Lantus SoloSTAR 13 units.  \par \par Since last visit, patient reported that she has had diarrhea for a long time, she went to see Dr. Gutiérrez, and they found a  3 cm left adrenal mass. Additional subcentimeter adrenal nodules; findings are concerning for neoplastic disease.  Dr. Gutiérrez sent off blood work which included 24 hour catecholamines which were negative, Chromogranin A was very mildly elevated Chromogranin A 148 ng/dl (<93).  Gastrin level was elevated.  Serotonin and VIP within normal limits.  \par \par She also underwent a PET dotate scan which \par \par She reports she had neuropathy, she is uncertain is she has nephropathy. \par \par She has left eye surgery, due tumor removal.   Does not know if there's retinopathy. \par \par She reports a prior history of heart attack or heart failure.  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  She recently had surgery on the left eye.  Going to follow up with eye doctor soon\par Didn't go to podiatry because the distance was too far for her. \par Current diabetes symptoms/problems include: There is no polyuria, no polydipsia or blurry vision.  \par Patient reports adherence to prescribed medical regimen. \par Patient reports adherence to prescribed physical activity and dietary recommendations.  \par \par In regards to Her DM health maintenance: \par Last visit to ophthalmology was: Date - Yes\par Last visit to podiatry was: Date - No\par The patient is adherent to diabetic foot precautions:Yes\par Last A1c was: Sept 2018 5.6%\par Last LDL was: Juen 2018 62 mg/dl\par Last urine micro-albumin was: July 2018 negative\par \par Regarding hypertension:She's taking amlodipine 5mg daily and metoprolol 25mg daily \par \par Regarding hyperlipidemia, she is currently on simvastatin 20mg daily \par \par Regarding elevated prolactin level, she had a prior history of pituitary lesion s/p surgery.  \par

## 2019-02-11 NOTE — ASSESSMENT
[FreeTextEntry1] : discussed fall prevention and need for improvement of balance.  she has multiple causes for fall risk including poor balance and poor vision.  discussed starting PT (last performed several years ago per Pratima).  Patient is reluctant at first, but agrees.  we discussed possibly using a folding walker instead of rollator, as patient with difficulty managing the rollator in office today.\par \par rtc 3 months with Dr. Caballero.\par \par \par \par

## 2019-02-11 NOTE — HISTORY OF PRESENT ILLNESS
[FreeTextEntry1] : 78 years old female with PMH of HTN, HLD, meningioma, seizure disorder, T2DM, and adrenal lesions who presents for routine follow up. 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 She was told by GI that her Endocrine is not keen on a biopsy as this time but she will follow up with her endocrine physician. \par \par She reports two falls in the last month. She uses a walker/Cane to ambulate and at times feels like her body cannot support her weight. She describes her upper extremities shaking and is not sure about any other associated symptoms. She is unclear about her last seizure. Daughter Pratima who is here today does not live with her and does report fasciculations which she has witnessed but is not sure about much else. Last seen by neurology last month on the 16th in [facility] and was told that’s he will discuss She tends to fall backwards without any prodromal symptoms. \par \par Ophthalmology. Dr Rambo Gil-neuophthalmology

## 2019-02-11 NOTE — PHYSICAL EXAM
[General Appearance - Alert] : alert [General Appearance - In No Acute Distress] : in no acute distress [Outer Ear] : the ears and nose were normal in appearance [Hearing Threshold Finger Rub Not [place]] : hearing was normal [Examination Of The Oral Cavity] : the lips and gums were normal [Neck Appearance] : the appearance of the neck was normal [Respiration, Rhythm And Depth] : normal respiratory rhythm and effort [Exaggerated Use Of Accessory Muscles For Inspiration] : no accessory muscle use [Auscultation Breath Sounds / Voice Sounds] : lungs were clear to auscultation bilaterally [Heart Rate And Rhythm] : heart rate was normal and rhythm regular [Heart Sounds] : normal S1 and S2 [Murmurs] : no murmurs [Edema] : there was no peripheral edema [Bowel Sounds] : normal bowel sounds [Abdomen Soft] : soft [Abdomen Tenderness] : non-tender [No Spinal Tenderness] : no spinal tenderness [Involuntary Movements] : no involuntary movements were seen [] : no rash [No Focal Deficits] : no focal deficits [FreeTextEntry1] : unable to stand without assistance, slow unsteady gait, difficulty with use of rollator, poor balance

## 2019-02-12 LAB — ALDOSTERONE SERUM: 11.6 NG/DL

## 2019-02-13 ENCOUNTER — RX RENEWAL (OUTPATIENT)
Age: 79
End: 2019-02-13

## 2019-02-13 LAB — ANDROST SERPL-MCNC: 69 NG/DL

## 2019-02-14 LAB
METANEPHRINE, PL: 40 PG/ML
NORMETANEPHRINE, PL: 141 PG/ML

## 2019-02-15 LAB
DHEA-SULFATE, SERUM: 32 UG/DL
RENIN ACTIVITY, PLASMA: 0.21 NG/ML/HR

## 2019-02-16 ENCOUNTER — RX RENEWAL (OUTPATIENT)
Age: 79
End: 2019-02-16

## 2019-02-22 LAB
CORTIS SAL-MCNC: NORMAL
CORTIS SERPL-MCNC: 10.5 UG/DL

## 2019-02-25 LAB
5OH-INDOLEACETATE 24H UR-MRATE: 2.2 MG/24 H
5OH-INDOLEACETATE UR-MCNC: 0.88 G/24 H
SPECIMEN VOL 24H UR: 2800 ML

## 2019-02-26 ENCOUNTER — APPOINTMENT (OUTPATIENT)
Dept: CARDIOLOGY | Facility: CLINIC | Age: 79
End: 2019-02-26

## 2019-02-28 LAB — DEXAMETHASONE LEVEL: NORMAL

## 2019-03-05 ENCOUNTER — APPOINTMENT (OUTPATIENT)
Dept: ENDOCRINOLOGY | Facility: CLINIC | Age: 79
End: 2019-03-05
Payer: MEDICARE

## 2019-03-05 VITALS
HEART RATE: 80 BPM | SYSTOLIC BLOOD PRESSURE: 110 MMHG | OXYGEN SATURATION: 98 % | DIASTOLIC BLOOD PRESSURE: 80 MMHG | HEIGHT: 61 IN

## 2019-03-05 DIAGNOSIS — E22.9 HYPERFUNCTION OF PITUITARY GLAND, UNSPECIFIED: ICD-10-CM

## 2019-03-05 PROCEDURE — 99214 OFFICE O/P EST MOD 30 MIN: CPT

## 2019-03-06 ENCOUNTER — TRANSCRIPTION ENCOUNTER (OUTPATIENT)
Age: 79
End: 2019-03-06

## 2019-03-08 PROBLEM — E22.9 ELEVATED PROLACTIN LEVEL: Status: ACTIVE | Noted: 2018-10-30

## 2019-03-13 NOTE — ASSESSMENT
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 \par \par 3)HLD\par Continue with simvastatin \par \par 4)History of pituitary tumor\par Prolactin level mildly elevated. \par \par \par 5) Hypothyroidism \par TSH normal on oct 2018\par Keep same medication\par \par 6)Adrenal incidentaloma\par AM cortisol was not suppressed\par Salivary cortisol mildly elevated. \par Will repeat AM cortisol\par Will check free cortisol in 24 hour urine. \par The Hounsfield unit of the largest nodule was 90. \par Recommend to establish care with urology.  \par \par \par Diabetes Health Care Maintenance\par - Opthalmology up to date: Yes\par -Podiatry up to date: Last seen in may 2018, referral given today 10/30/2018 \par -Antiplatelet agent: No\par  -Urine microalbumin: No recent labs. check today \par -ACE-I/ARB: No\par -Patient to call for persistent glucose < 70 or > 300\par -Discussed hypoglycemic protocol. \par -Yearly flu vaccine recommended \par \par EDUCATION: Reviewed 'ABC' of diabetes management (respective goals in parentheses): A1C (<7), blood pressure (<140/90), and cholesterol (LDL <100).\par \par COMPLIANCE at present is estimated to be: Good\par FOLLOW UP: I recommend that frequency of visits for diabetes care be every 3 month \par \par  \par .

## 2019-03-13 NOTE — HISTORY OF PRESENT ILLNESS
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 \par \par Her current DM regimen is: Victoza 1.2mg daily, Metformin 1500mg daily, (500 in the morning and 1000mg at night)\par Lantus SoloSTAR 12 units.  \par \par Since last visit, patient reported that she has had diarrhea for a long time, she went to see Dr. Gutiérrez, and they found a  3 cm left adrenal mass. Additional subcentimeter adrenal nodules; findings are concerning for neoplastic disease.  Dr. Gutiérrez sent off blood work which included 24 hour catecholamines which were negative, Chromogranin A was very mildly elevated Chromogranin A 148 ng/dl (<93).  Gastrin level was elevated.  Serotonin and VIP within normal limits.  24 hour 5HIAA was done and was found to be within normal limits as well by me.  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  Her salivary cortisol level was also elevated to 140 ng/dl (normal <100).  She denies any history of osteoporosis. No easy bruising, no pigmented straie.  \par \par She also underwent a PET dotate scan which physiologic radiotracer activity in bilateral adrenal glands limits evaluation of a 3.1x2.7cm left adrenal mass and subcentimeter bilateral adrenal nodules which are unchanged as compared to CT-dated 12/9/2018.  \par Left left adrenal mass arises from the anterior limb of the left adrenal gland where there is increased DOTATATE activity which may be related to cheryl adrenal lesion vs. physiologic activity (SUV 18.4).  The reminder of the left adrenal mass does not demonstrate increased radiotracer activity.  Subcentimeter bilateral adrenal nodules are difficult to resolve on PET due to their small size and physiologic adrenal activity \par \par She reports she had neuropathy, she is uncertain is she has nephropathy. \par \par She has left eye surgery, due tumor removal.   Does not know if there's retinopathy. \par \par She reports a prior history of heart attack or heart failure.  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  She recently had surgery on the left eye.  Going to follow up with eye doctor soon\par Didn't go to podiatry because the distance was too far for her. \par Current diabetes symptoms/problems include: There is no polyuria, no polydipsia or blurry vision.  \par Patient reports adherence to prescribed medical regimen. \par Patient reports adherence to prescribed physical activity and dietary recommendations.  \par \par In regards to Her DM health maintenance: \par Last visit to ophthalmology was: Date - Yes\par Last visit to podiatry was: Date - No\par The patient is adherent to diabetic foot precautions:Yes\par Last A1c was: Sept 2018 5.6%\par Last LDL was: Juen 2018 62 mg/dl\par Last urine micro-albumin was: July 2018 negative\par \par Regarding hypertension:She's taking amlodipine 5mg daily and metoprolol 25mg daily \par \par Regarding hyperlipidemia, she is currently on simvastatin 20mg daily \par \par Regarding elevated prolactin level, she had a prior history of pituitary lesion s/p surgery.  \par

## 2019-03-19 LAB
ACTH-ESO: 5.1 PG/ML
CORTIS 24H UR-MCNC: 24 H
CORTIS 24H UR-MRATE: 23 MCG/24 H
CORTIS SERPL-MCNC: 10.5 UG/DL
CREAT 24H UR-MCNC: 0.8 G/24 H
CREAT ?TM UR-MCNC: 34 MG/DL
PROT ?TM UR-MCNC: 24 HR
SPECIMEN VOL 24H UR: 2300 ML
SPECIMEN VOL 24H UR: 2300 ML

## 2019-03-27 ENCOUNTER — APPOINTMENT (OUTPATIENT)
Dept: NEUROLOGY | Facility: CLINIC | Age: 79
End: 2019-03-27
Payer: MEDICARE

## 2019-03-27 VITALS
DIASTOLIC BLOOD PRESSURE: 72 MMHG | WEIGHT: 186 LBS | HEIGHT: 61 IN | HEART RATE: 73 BPM | SYSTOLIC BLOOD PRESSURE: 132 MMHG | BODY MASS INDEX: 35.12 KG/M2

## 2019-03-27 DIAGNOSIS — Z82.0 FAMILY HISTORY OF EPILEPSY AND OTHER DISEASES OF THE NERVOUS SYSTEM: ICD-10-CM

## 2019-03-27 PROCEDURE — 99205 OFFICE O/P NEW HI 60 MIN: CPT

## 2019-03-29 ENCOUNTER — RX RENEWAL (OUTPATIENT)
Age: 79
End: 2019-03-29

## 2019-04-09 LAB — CORTIS SERPL-MCNC: 12.4 UG/DL

## 2019-04-11 NOTE — REVIEW OF SYSTEMS
[Feeling Poorly] : feeling poorly [Depression] : depression [As Noted in HPI] : as noted in HPI [Negative] : Endocrine

## 2019-04-12 NOTE — ASSESSMENT
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 pseudodementia in the setting of depression.

## 2019-04-12 NOTE — DATA REVIEWED
[de-identified] : MRI Brain & Orbits +/- Gadolinium (5/19/18): Left cavernous sinus meningioma, extending into left orbit\par PET CT Skull to Thigh (1/5/19): Possibly increased side of left cavernous sinus meningioma; Left adrenal mass

## 2019-04-12 NOTE — PHYSICAL EXAM
[General Appearance - Alert] : alert [General Appearance - In No Acute Distress] : in no acute distress [Oriented To Time, Place, And Person] : oriented to person, place, and time [Over the Past 2 Weeks, Have You Felt Down, Depressed, or Hopeless?] : 1.) Over the past 2 weeks, have you felt down, depressed, or hopeless? Yes [Over the Past 2 Weeks, Have You Felt Little Interest or Pleasure Doing Things?] : 2.) Over the past 2 weeks, have you felt little interest or pleasure doing things? Yes [Person] : oriented to person [Place] : oriented to place [Cranial Nerves Trigeminal (V)] : facial sensation intact symmetrically [Cranial Nerves Facial (VII)] : face symmetrical [Cranial Nerves Vestibulocochlear (VIII)] : hearing was intact bilaterally [Cranial Nerves Accessory (XI - Cranial And Spinal)] : head turning and shoulder shrug symmetric [Cranial Nerves Glossopharyngeal (IX)] : tongue and palate midline [Peripheral Vision Was Full To Confrontation Right Eye] : peripheral vision was full to confrontation on the right [Cranial Nerves Hypoglossal (XII)] : there was no tongue deviation with protrusion [Pupil Nonreactive To Light - Direct, Right Only] : reacts to light [Central Scotomata Right Eye] : central scotoma present in the right eye [Pupil Accommodation Impaired Right Eye] : reacts to accommodation [Motor Tone] : muscle tone was normal in all four extremities [Motor Handedness Right-Handed] : the patient is right hand dominant [No Muscle Atrophy] : normal bulk in all four extremities [Motor Strength] : muscle strength was normal in all four extremities [Sensation Tactile Decrease] : light touch was intact [Sensation Pain / Temperature Decrease] : pain and temperature was intact [Abnormal Walk] : normal gait [2+] : Ankle jerk left 2+ [Conjunctiva Right] : the right conjunctiva was normal [Outer Ear] : the ears and nose were normal in appearance [Oropharynx] : the oropharynx was normal [Neck Appearance] : the appearance of the neck was normal [Auscultation Breath Sounds / Voice Sounds] : lungs were clear to auscultation bilaterally [Heart Rate And Rhythm] : heart rate was normal and rhythm regular [Full Pulse] : the pedal pulses are present [Murmurs] : no murmurs [Heart Sounds] : normal S1 and S2 [Abdomen Tenderness] : non-tender [Bowel Sounds] : normal bowel sounds [Abdomen Soft] : soft [Nail Clubbing] : no clubbing  or cyanosis of the fingernails [No Spinal Tenderness] : no spinal tenderness [] : no rash [Skin Color & Pigmentation] : normal skin color and pigmentation [Short Term Intact] : short term memory impaired [Time] : disoriented to time [Remote Intact] : remote memory impaired [Naming Objects] : difficulty naming common objects [Repeating Phrases] : difficulty repeating a phrase [Fluency] : fluency not intact [___ / 30] : the patient achieved a total score of [unfilled] /30 [___ / 5] : Visuospatial / Executive: [unfilled] / 5 [0 / 0] : Memory: 0 / 0 [___ / 3] : Attention (Serial 7 subtraction): [unfilled] / 3 [___ / 1] : Fluency: [unfilled] / 1 [___ / 2] : Abstraction: [unfilled] / 2 [___ / 5] : Delayed Recall: [unfilled] / 5 [___ / 6] : Orientation: [unfilled] / 6 [Cranial Nerves Trochlear - Right Only] : 4th cranial nerve was intact [Cranial Nerves Oculomotor - Right Only] : 3rd cranial nerve was intact [Cranial Nerves Abducens - Right Only] : the 6th cranial nerve was intact [Paresis Pronator Drift Right-Sided] : no pronator drift on the right [Paresis Pronator Drift Left-Sided] : no pronator drift on the left [Motor Strength Upper Extremities Bilaterally] : strength was normal in both upper extremities [Romberg's Sign] : Romberg's sign was negtive [Motor Strength Lower Extremities Bilaterally] : strength was normal in both lower extremities [Past-pointing] : there was no past-pointing [Dysdiadochokinesia Bilaterally] : not present [Tremor] : no tremor present [Coordination - Dysmetria Impaired Finger-to-Nose Bilateral] : not present [Coordination - Dysmetria Impaired Heel-to-Shin Bilateral] : not present [Plantar Reflex Right Only] : normal on the right [Plantar Reflex Left Only] : normal on the left [FreeTextEntry8] : Normal, narrow-based gait. Has difficulty with initiating tiptoe and heel gaits, and cannot complete tandem gait.

## 2019-04-12 NOTE — HISTORY OF PRESENT ILLNESS
[FreeTextEntry1] : This is a 78-year-old right-handed woman who presents today stating that she has been stressed and upset for a long time since she has gotten sick, making her wonder what the purpose of life is. She has had friends visit her and health aides come to help her, but she finds that they are not helpful and that they do not behave as they should. She has noticed that she sometimes behaves strangely. 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 She had another brain tumor identified in November 2010, gradually increasing in size and causing her left eye to bulge out. Her left eye was surgically removed in July 2018. The patient has also had diminished hearing on the left side since this surgery. The daughter states that the patient has had difficulty with remembering names since 2014, but over the past few months, she has also been confusing past events with recent events. She has also been saying that she sees people who are not there, and she wonders whether the patient may be confusing recent memory with past memories of people visiting her home when she used to live in [facility]. The patient seems to remember dreams and gets upset by them when she wakes up. Even when she sleeps, she often talks, and even sometimes sleepwalks.\par \par The patient has followed with a neurologist at [facility] and an ophthalmologist at [facility] for her brain tumor and effects on the eye. At present, radiation treatment for the tumor is on hold due to concern that it may be affecting the patient's memory, as well as vision out of her right eye.\par \par The patient had a fall with head trauma in December 2013, causing a likely subdural hemorrhage that required surgical evacuation at [facility]. While in the ICU, she had witnessed seizures, so she was started on levetiracetam, initially at 750mg BID, and subsequently increased to 1000mg BID.

## 2019-04-17 LAB — DEXAMETHASONE LEVEL: NORMAL

## 2019-05-14 PROBLEM — M25.552 HIP PAIN, LEFT: Status: ACTIVE | Noted: 2018-01-30

## 2019-05-14 NOTE — PHYSICAL EXAM
[Well Nourished] : well nourished [Well Developed] : well developed [Normal Hearing] : hearing was normal [No LAD] : no lymphadenopathy [Thyroid Not Enlarged] : the thyroid was not enlarged [No Thyroid Nodules] : there were no palpable thyroid nodules [No Accessory Muscle Use] : no accessory muscle use [Clear to Auscultation] : lungs were clear to auscultation bilaterally [Normal Rate] : heart rate was normal  [Normal S1, S2] : normal S1 and S2 [Regular Rhythm] : with a regular rhythm [Pedal Pulses Normal] : the pedal pulses are present [No Edema] : there was no peripheral edema [Not Tender] : non-tender [Soft] : abdomen soft [Post Cervical Nodes] : posterior cervical nodes [Not Distended] : not distended [Anterior Cervical Nodes] : anterior cervical nodes [Cranial Nerves Intact] : cranial nerves 2-12 were intact [No Spinal Tenderness] : no spinal tenderness [No Motor Deficits] : the motor exam was normal [No Tremors] : no tremors [de-identified] : left eye lid swelling [de-identified] : POOR RECENT MEMORY [de-identified] : AMBULATES WTIH WALKER

## 2019-05-14 NOTE — ASSESSMENT
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 \par \par 3)HLD\par Continue with simvastatin \par \par 4)History of pituitary tumor\par Prolactin level mildly elevated. \par \par 5) Hypothyroidism \par TSH normal on oct 2018\par Keep same medication\par \par 6)Adrenal incidentaloma\par AM cortisol was not suppressed x3 tests\par Salivary cortisol mildly elevated. \par Urine cortisol was normal. \par The Hounsfield unit of the largest nodule was 90. \par s/p MRI of abdomen and will be following up with Urology.  \par \par \par \par Diabetes Health Care Maintenance\par - Opthalmology up to date: Yes\par -Podiatry up to date: Last seen in may 2018, referral given today 10/30/2018 \par -Antiplatelet agent: No\par  -Urine microalbumin: No recent labs. check today \par -ACE-I/ARB: No\par -Patient to call for persistent glucose < 70 or > 300\par -Discussed hypoglycemic protocol. \par -Yearly flu vaccine recommended \par \par EDUCATION: Reviewed 'ABC' of diabetes management (respective goals in parentheses): A1C (<7), blood pressure (<140/90), and cholesterol (LDL <100).\par \par FU in 2-3 months. \par \par  \par .  [Action and use of Insulin] : action and use of short and long-acting insulin [Self Monitoring of Blood Glucose] : self monitoring of blood glucose [Injection Technique, Storage, Sharps Disposal] : injection technique, storage, and sharps disposal [Insulin Self-Administration] : insulin self-administration

## 2019-05-14 NOTE — HISTORY OF PRESENT ILLNESS
[___] : [unfilled] [FreeTextEntry1] : LAURENCE KEY is a 79 year old female with PMHx of mild dementia, cavernous sinus/pituitary mass-suspected, SDH s/p craniotomy, L eye blindness, depression, anxiety, HTN, HDL, Type 2 DM, adrenal adenoma here for endocrine follow up. \par \par Her current DM regimen is: Victoza 1.2mg daily, Metformin 1500mg daily, (500 in the morning and 1000mg at night)\par Lantus SoloSTAR 12 units.  \par \par Since last visit, patient reported that she has had diarrhea for a long time, she went to see Dr. Gutiérrez, and they found a  3 cm left adrenal mass. Additional subcentimeter adrenal nodules; findings are concerning for neoplastic disease.  Dr. Gutiérrez sent off blood work which included 24 hour catecholamines which were negative, Chromogranin A was very mildly elevated Chromogranin A 148 ng/dl (<93).  Gastrin level was elevated.  Serotonin and VIP within normal limits.  24 hour 5HIAA was done and was found to be within normal limits as well by me.  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  Her salivary cortisol level was also elevated to 140 ng/dl (normal <100).  She denies any history of osteoporosis. No easy bruising, no pigmented striae. The Hounsfield Unit  of the largest mass was 90. She established care with Dr. Mendoza, repeat salivary cortisol 152 ng/dl (<100 is normal) and 24 hour urine for free cortisol again is normal 17 mcg/24h.  She has a follow up visit in June 2019. MRI of the adrenal gland was ordered and just completed, pending read\par \par She also underwent a PET dotate scan which physiologic radiotracer activity in bilateral adrenal glands limits evaluation of a 3.1x2.7cm left adrenal mass and subcentimeter bilateral adrenal nodules which are unchanged as compared to CT-dated 12/9/2018.  \par Left left adrenal mass arises from the anterior limb of the left adrenal gland where there is increased DOTATATE activity which may be related to cheryl adrenal lesion vs. physiologic activity (SUV 18.4).  The reminder of the left adrenal mass does not demonstrate increased radiotracer activity.  Subcentimeter bilateral adrenal nodules are difficult to resolve on PET due to their small size and physiologic adrenal activity \par \par She reports she had neuropathy, she is uncertain is she has nephropathy. \par \par She reports a prior history of heart attack or heart failure.  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  She recently had surgery on the left eye.  Going to follow up with eye doctor soon\par \par Regarding hypertension:She's taking amlodipine 5mg daily and metoprolol 25mg daily \par \par Regarding hyperlipidemia, she is currently on simvastatin 20mg daily \par \par Regarding elevated prolactin level, she had a prior history of pituitary lesion s/p surgery.  \par

## 2019-05-16 NOTE — PHYSICAL EXAM
[General Appearance - Well Nourished] : well nourished [General Appearance - Well Developed] : well developed [Edema] : no peripheral edema [Exaggerated Use Of Accessory Muscles For Inspiration] : no accessory muscle use [] : no respiratory distress [Oriented To Time, Place, And Person] : oriented to person, place, and time [Affect] : the affect was normal [FreeTextEntry1] : wheelchair bound

## 2019-05-16 NOTE — ASSESSMENT
[FreeTextEntry1] : 77 y/o F w/ L adrenal node, possibly functionally positive causing Cushing's syndrome\par - MRI to better characterize the adrenal mass\par - will d/w Dr. Kang regarding functional work up

## 2019-05-16 NOTE — HISTORY OF PRESENT ILLNESS
[FreeTextEntry1] : 79 y/o F h/o dementia, s/p craniotomy for SDH, depression, anxiety, HTN, HLD, DM, here for L adrenal mass. had a CT scan for w/u of diarrhea, found to have a L adrenal mass, functional w/u Dr. Kang (endocrinology). She had a dexamethasone suppression, appears to be low dose dexamethasone suppression which was positive, 24h urine cortisol was normal.\par \par Denies F/C, N/V, CP, SOB, abd pain, constipation, dysuria, hematuria.

## 2019-05-29 NOTE — REVIEW OF SYSTEMS
[Feeling Fatigued] : feeling fatigued [Eyeglasses] : currently wearing eyeglasses [Chest Pain] : chest pain [Palpitations] : palpitations [Change In The Stool] : change in stool [Incontinence] : incontinence [Negative] : Heme/Lymph

## 2019-05-30 NOTE — HISTORY OF PRESENT ILLNESS
[FreeTextEntry1] : 78 year old [place] female with history of sinus/pituitary brain tumor excision (1982), s/p subdural hematoma -> craniotomy in 2013, poor gait and balance, cognitive impairment, left eye blindness and decreased vision in the right eye, seizure disorder, HTN, Hyperlipidemia, type 2 diabetes and central obesity.  \par \par She reports that since August of 2017 she has intermittently been experiencing episodes of sharp, midsternal chest pain and diaphoresis.\par \par Initially, patient reported to her urgent care facility in August and an EKG was taken only. She traveled to [place] for a few months and when she returned she continued to experience chest discomfort. \par \par Presented to the [facility] ER on November 2, 2017. Cardiac enzymes were negative and  transthoracic echocardiogram was normal with no LV dysfunction or valvular disease. EKG were negative for active ischemia.\par  No stress test ing was performed. Patient reports that [facility] believed her symptoms were gastric in nature.\par \par She does report that she has an appointment with Dr. Gutiérrez, gastroenterologist on December 13, 2017 for a further evaluation. Patient does report that she has intermittent diarrhea with mucous.\par \par She presents today for a cardiac evaluation. 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 Scheduled for July 13, 2018. Patient's left eye is blind and covered secondary to protruding tumor. \par \par Patient's blood pressure today is: 131/78. EKG is stable and unchanged in sinus rhythm @ 66 bpm. \par Of note, patient underwent a cardiac CT in February of 2018. Calcium score was zero. She was found to have a normal coronary CT angiogram with left coronary artery dominance. \par \par She reports that she has not been experiencing any chest discomfort or pain in the recent past, however, when she lies down flat, occasionally she feels as if she needs to "catch her breath". \par \par Reviewed patient's recent blood work which was drawn two days ago. All labs within acceptable limits. Hgb A1C has improved from 9.3% in April 2018  to 6.3 this June. CBC with good H/H, WBC count mildly elevated which is likely secondary to prednisone "pulse". \par \par Patient is conversant, good historian with unsteady gait secondary to brain tumor.\par \par \par interval f/u 8/24/2018\par doing well after surgery\par here with daughter\par \par Interval Note\par May 29, 2019\par \par 79 year old [place] female with history of sinus/pituitary brain tumor excision (1982), s/p subdural hematoma -> craniotomy in 2013, poor gait and balance, cognitive impairment, left eye blindness and decreased vision in the right eye, seizure disorder, HTN, Hyperlipidemia, type 2 diabetes and central obesity.      \par \par Patient is now s/p left eye enucleation. She reports that she is having difficulty with prosthetic eye not "remaining in place". To be followed up by Dr. Sage, opthalmic surgeon. \par \par Patient remains on daily low dose steroids and as a result is having difficulty with blood sugar control. She is followed by Dr. Kang.\par \par In addition, according to endocrine notes patient has been identified with an adrenal adenoma:\par - Subcm L renal hypodensity, L adrenal mass, and smaller b/l nodules - suspicious for malignancy. \par - Seen by uro on 4/23/19 - MRI performed on May 11, 2019. \par - Results not completely conclusive due to patient's inability to lie still. Report indicates a lipid rich adenoma.\par \par Otherwise, blood pressure today and EKG are stable. She reports that her "chest hurts" when she coughs, mostly in the night time hours and she feels that  "something is dripping into her throat"\par \par \par \par \par

## 2019-05-30 NOTE — ASSESSMENT
[FreeTextEntry1] : 79 year old [place] citizen female with history of sinus/pituitary brain tumor excision (1982), s/p subdural hematoma -> craniotomy in 2013, poor gait and balance, cognitive impairment, left eye blindness and decreased vision in the right eye, seizure disorder, HTN, Hyperlipidemia, type 2 diabetes and central obesity.      \par \par Assessment/ Plan\par \par Blood pressure today is in acceptable control\par EKG in sinus rhythm, stable and unchanged\par Recent lab work demonstrated lipid panel in good control- drawn on May 14, 2019\par \par Total: 152\par HDL    36\par LDL 79\par \par Hgb A1C  6.1\par \par PLAN\par Continue on Amlodipine 5 mg daily and Metoprolol 25 mg 1/2 tablet BID\par Continue Simvastatin 20 mg QHS\par Continue with endocrine regimen:\par Victoza, Metformin, Lantus\par \par Patient supervised closely by gerontology and endocrinology\par \par \par Follow up 6 months with cardiology\par \par

## 2019-05-30 NOTE — PHYSICAL EXAM
[Normal Oral Mucosa] : normal oral mucosa [No Oral Pallor] : no oral pallor [No Oral Cyanosis] : no oral cyanosis [Normal Jugular Venous A Waves Present] : normal jugular venous A waves present [Normal Jugular Venous V Waves Present] : normal jugular venous V waves present [No Jugular Venous Prieto A Waves] : no jugular venous prieto A waves [Respiration, Rhythm And Depth] : normal respiratory rhythm and effort [Exaggerated Use Of Accessory Muscles For Inspiration] : no accessory muscle use [Auscultation Breath Sounds / Voice Sounds] : lungs were clear to auscultation bilaterally [Abnormal Walk] : normal gait [Gait - Sufficient For Exercise Testing] : the gait was sufficient for exercise testing [Nail Clubbing] : no clubbing of the fingernails [Cyanosis, Localized] : no localized cyanosis [Petechial Hemorrhages (___cm)] : no petechial hemorrhages [Skin Color & Pigmentation] : normal skin color and pigmentation [] : no rash [No Venous Stasis] : no venous stasis [Skin Lesions] : no skin lesions [No Skin Ulcers] : no skin ulcer [No Xanthoma] : no  xanthoma was observed [Oriented To Time, Place, And Person] : oriented to person, place, and time [Affect] : the affect was normal [Mood] : the mood was normal [No Anxiety] : not feeling anxious [Not Palpable] : not palpable [Normal Rate] : normal [Rhythm Regular] : regular [Normal S1] : normal S1 [Normal S2] : normal S2 [1+] : left 1+ [___ +] : [unfilled]U+ pitting edema to L ankle [FreeTextEntry1] : left leg weakness, poor balance and gait

## 2019-05-30 NOTE — REASON FOR VISIT
[Initial Evaluation] : an initial evaluation of [FreeTextEntry2] : history of sinus/pituitary brain tumor excision, legally blind, s/p subdural hematoma-> craniotomy 12/2013, cognitive impairment, HTN, HLD, Type ll diabetes,

## 2019-06-05 PROBLEM — Z91.89 NEVER USES SUNSCREEN: Status: ACTIVE | Noted: 2019-01-01

## 2019-06-05 PROBLEM — L81.8 IDIOPATHIC GUTTATE HYPOMELANOSIS: Status: ACTIVE | Noted: 2019-01-01

## 2019-06-05 PROBLEM — D48.5 NEOPLASM OF UNCERTAIN BEHAVIOR OF SKIN: Status: ACTIVE | Noted: 2019-01-01

## 2019-06-05 PROBLEM — L81.4 SOLAR LENTIGO: Status: ACTIVE | Noted: 2019-01-01

## 2019-06-07 NOTE — REVIEW OF SYSTEMS
[As Noted in HPI] : as noted in HPI [Negative] : Heme/Lymph [FreeTextEntry3] : S/p left globe removal

## 2019-06-07 NOTE — PHYSICAL EXAM
[General Appearance - Alert] : alert [General Appearance - In No Acute Distress] : in no acute distress [Over the Past 2 Weeks, Have You Felt Down, Depressed, or Hopeless?] : 1.) Over the past 2 weeks, have you felt down, depressed, or hopeless? Yes [Oriented To Time, Place, And Person] : oriented to person, place, and time [Over the Past 2 Weeks, Have You Felt Little Interest or Pleasure Doing Things?] : 2.) Over the past 2 weeks, have you felt little interest or pleasure doing things? Yes [Person] : oriented to person [Place] : oriented to place [___ / 30] : the patient achieved a total score of [unfilled] /30 [___ / 5] : Visuospatial / Executive: [unfilled] / 5 [0 / 0] : Memory: 0 / 0 [___ / 3] : Attention (Serial 7 subtraction): [unfilled] / 3 [___ / 1] : Fluency: [unfilled] / 1 [___ / 2] : Abstraction: [unfilled] / 2 [___ / 5] : Delayed Recall: [unfilled] / 5 [___ / 6] : Orientation: [unfilled] / 6 [Cranial Nerves Facial (VII)] : face symmetrical [Cranial Nerves Vestibulocochlear (VIII)] : hearing was intact bilaterally [Cranial Nerves Accessory (XI - Cranial And Spinal)] : head turning and shoulder shrug symmetric [Cranial Nerves Glossopharyngeal (IX)] : tongue and palate midline [Cranial Nerves Hypoglossal (XII)] : there was no tongue deviation with protrusion [Peripheral Vision Was Full To Confrontation Right Eye] : peripheral vision was full to confrontation on the right [Central Scotomata Right Eye] : central scotoma present in the right eye [Pupil Nonreactive To Light - Direct, Right Only] : reacts to light [Pupil Accommodation Impaired Right Eye] : reacts to accommodation [Motor Tone] : muscle tone was normal in all four extremities [Motor Strength] : muscle strength was normal in all four extremities [No Muscle Atrophy] : normal bulk in all four extremities [Motor Handedness Right-Handed] : the patient is right hand dominant [Abnormal Walk] : normal gait [2+] : Ankle jerk left 2+ [Outer Ear] : the ears and nose were normal in appearance [Oropharynx] : the oropharynx was normal [Neck Appearance] : the appearance of the neck was normal [] : no respiratory distress [Auscultation Breath Sounds / Voice Sounds] : lungs were clear to auscultation bilaterally [Heart Rate And Rhythm] : heart rate was normal and rhythm regular [Heart Sounds] : normal S1 and S2 [Murmurs] : no murmurs [Arterial Pulses Carotid] : carotid pulses were normal with no bruits [Full Pulse] : the pedal pulses are present [Bowel Sounds] : normal bowel sounds [Abdomen Soft] : soft [Abdomen Tenderness] : non-tender [No CVA Tenderness] : no ~M costovertebral angle tenderness [No Spinal Tenderness] : no spinal tenderness [Nail Clubbing] : no clubbing  or cyanosis of the fingernails [Skin Color & Pigmentation] : normal skin color and pigmentation [Time] : disoriented to time [Short Term Intact] : short term memory impaired [Remote Intact] : remote memory impaired [Naming Objects] : difficulty naming common objects [Repeating Phrases] : difficulty repeating a phrase [Fluency] : fluency not intact [Cranial Nerves Oculomotor - Right Only] : 3rd cranial nerve was intact [Cranial Nerves Trochlear - Right Only] : 4th cranial nerve was intact [Cranial Nerves Abducens - Right Only] : the 6th cranial nerve was intact [Paresis Pronator Drift Right-Sided] : no pronator drift on the right [Paresis Pronator Drift Left-Sided] : no pronator drift on the left [Motor Strength Upper Extremities Bilaterally] : strength was normal in both upper extremities [Motor Strength Lower Extremities Bilaterally] : strength was normal in both lower extremities [Romberg's Sign] : Romberg's sign was negtive [Tactile Decrease Shoulders Right] : normal right shoulder [Tactile Decrease Shoulders Left] : normal left shoulder [Tactile Decrease Entire Right Arm] : normal right arm [Tactile Decrease Entire Left Arm] : normal left arm [Tactile Decrease Hand] : normal left hand [Tactile Decrease Entire Leg Left] : normal left leg [Tactile Decrease Entire Leg Right] : normal right leg [Pain / Temperature Decrease Shoulders Right Only] : normal right shoulder [Pain / Temperature Decrease Shoulders Left Only] : normal left shoulder [Pain / Temperature Decrease Entire Arm Right] : normal right arm [Pain / Temperature Decrease Entire Arm Left] : normal left arm [Pain / Temp Decrease Hand] : normal left hand [Pain / Temp Decrease Entire Leg - Right] : normal right leg [Pain / Temp Decrease Entire Leg - Left] : normal left leg [Past-pointing] : there was no past-pointing [Tremor] : no tremor present [Dysdiadochokinesia Bilaterally] : not present [Coordination - Dysmetria Impaired Finger-to-Nose Bilateral] : not present [Coordination - Dysmetria Impaired Heel-to-Shin Bilateral] : not present [Plantar Reflex Right Only] : normal on the right [Plantar Reflex Left Only] : normal on the left [___] : absent on the right [___] : absent on the left [FreeTextEntry8] : Normal, narrow-based gait. Has difficulty with initiating tiptoe and heel gaits, and cannot complete tandem gait. [FreeTextEntry1] : Left cheek hyperpigmented growth; Browna nd whie spots at b/l forearms and b/l legs

## 2019-06-07 NOTE — HISTORY OF PRESENT ILLNESS
[FreeTextEntry1] : FROM 3/27/19:\par \par This is a 78-year-old right-handed woman who presents today stating that she has been stressed and upset for a long time since she has gotten sick, making her wonder what the purpose of life is. She has had friends visit her and health aides come to help her, but she finds that they are not helpful and that they do not behave as they should. She has noticed that she sometimes behaves strangely. For example, she has been having vivid dreams, and finds herself talking, crying, and fighting with other people in her sleep.\par \par The daughter states the patient had a brain (pituitary) tumor, diagnosed in  and removed in . She had another brain tumor identified in 2010, gradually increasing in size and causing her left eye to bulge out. Her left eye was surgically removed in 2018. The patient has also had diminished hearing on the left side since this surgery. The daughter states that the patient has had difficulty with remembering names since , but over the past few months, she has also been confusing past events with recent events. She has also been saying that she sees people who are not there, and she wonders whether the patient may be confusing recent memory with past memories of people visiting her home when she used to live in [facility]. The patient seems to remember dreams and gets upset by them when she wakes up. Even when she sleeps, she often talks, and even sometimes sleepwalks.\par \par The patient has followed with a neurologist at [facility] and an ophthalmologist at [facility] for her brain tumor and effects on the eye. At present, radiation treatment for the tumor is on hold due to concern that it may be affecting the patient's memory, as well as vision out of her right eye.\par \par The patient had a fall with head trauma in 2013, causing a likely subdural hemorrhage that required surgical evacuation at [facility]. While in the ICU, she had witnessed seizures, so she was started on levetiracetam, initially at 750mg BID, and subsequently increased to 1000mg BID.\par \par \par UPDATES ON 19:\par \par The patient's other daughter, who lives in [place], visited the patient in 2019 and has been visiting again since May 23. She has noticed that the patient has been forgetting things that have been said to her, and often asks people to repeat things. She will often seem to daydream, and then ask about things or people she has seen in her dream, as though they were real (e.g., asking where her brother was, although he  many years ago).

## 2019-06-07 NOTE — DATA REVIEWED
[de-identified] : MRI Brain & Orbits +/- Gadolinium (5/19/18): Left cavernous sinus meningioma, extending into left orbit\par PET CT Skull to Thigh (1/5/19): Possibly increased side of left cavernous sinus meningioma; Left adrenal mass [de-identified] : MRI Abdomen +/- Mk (5/11/19):\par B/l adrenal nodules, largest on left (2.7 cm diameter)

## 2019-06-07 NOTE — ASSESSMENT
[FreeTextEntry1] : 78 RHF with left cavernous sinus meningioma leading to left eye surgical removal and subdural hemorrhage, now resolved but resulting in seizures, also with concern for mild cognitive impairment with memory loss, less likely pseudodementia given improvement in depressed mood since last clinic visit.

## 2019-06-14 NOTE — HISTORY OF PRESENT ILLNESS
[FreeTextEntry1] : 80 y/o F h/o dementia, s/p carniotomy for SDH, depression, anxiety, HTN, HLD, DM, here for f/u regarding L adrenal mass. She had an MRI, still inconclusive. w/u from Dr. Kang concerning for primary Cushing's syndrome. Started taking donepizil recent b/c of her dementia and had her Keppra adjusted.\par \par Denies F/C, N/V, CP, SOB, hematuria. She does admit to epigastric discomfort which has been worked up by GI.

## 2019-06-14 NOTE — ASSESSMENT
[FreeTextEntry1] : 78 y/o F w/ L adrenal nodule, suggestive for hypercortisolism, MRI inconclusive\par - discussed risk and benefits \par - repeat MRI in 6 months to reassess size, if continues to grow then will proceed to extirpation\par - f/u with Dr. Kang

## 2019-06-14 NOTE — PHYSICAL EXAM
[Edema] : no peripheral edema [] : no respiratory distress [Exaggerated Use Of Accessory Muscles For Inspiration] : no accessory muscle use [No Focal Deficits] : no focal deficits [Normal Station and Gait] : the gait and station were normal for the patient's age [FreeTextEntry1] : no JVD

## 2019-06-20 PROBLEM — H47.20 OPTIC ATROPHY OF RIGHT EYE: Status: ACTIVE | Noted: 2019-01-01

## 2019-08-20 PROBLEM — Z13.820 OSTEOPOROSIS SCREENING: Status: ACTIVE | Noted: 2018-01-30

## 2019-08-20 PROBLEM — B07.8 VERRUCA VULGARIS: Status: ACTIVE | Noted: 2019-01-01

## 2019-08-20 NOTE — REASON FOR VISIT
[Initial Consultation] : an initial consultation for [Family Member] : family member [FreeTextEntry2] : referred by Alysia Jo for left hearing loss and nasal discharge

## 2019-08-20 NOTE — HISTORY OF PRESENT ILLNESS
[de-identified] : 79 year old female referred by Alysia Jo for left hearing loss and nasal discharge. Reports intermittent nasal congestion with intermittent thick yellow/brown nasal discharge present for about a week then goes away, but reports constant clear nasal discharge, for the past two years. Reports intermittent use of saline spray. Reports constant sinus pain/pressure, has a brain tumor takes Pregabalin for the pain with relief. Pituitary tumor removed 1992, went through the nose, loss sense of smell since surgery. Loss left eye sight in 2010. Left eye removed 7/2018 due to growing brain tumor (meningioma). \par Reports left ear hearing loss with gradual onset since 2011/2012. Reports history of left ear tinnitus that has since result.  Denies ear infections, otalgia.  Was told hearing loss was due to brain tumor. Denies recent audiogram. Denies use of hearing aids. \par \par \par

## 2019-08-20 NOTE — REVIEW OF SYSTEMS
[Ear Pain] : ear pain [Hearing Loss] : hearing loss [Nasal Congestion] : nasal congestion [Problem Snoring] : problem snoring [Sinus Pain] : sinus pain [Sinus Pressure] : sinus pressure [Sense Of Smell Problem] : sense of smell problem [Wheezing] : wheezing [Shortness Of Breath] : shortness of breath [As Noted in HPI] : as noted in HPI [Cough] : cough [Depression] : depression [Anxiety] : anxiety [Negative] : Heme/Lymph

## 2019-08-20 NOTE — PHYSICAL EXAM
[Midline] : trachea located in midline position [Normal] : no rashes [de-identified] : L OME present

## 2019-08-20 NOTE — DATA REVIEWED
[de-identified] : Audio - Bilateral SNHL with 40 to 50 dB Conductive loss on the L as well. \par Tymps - Type A on R and Type B on L.

## 2019-08-20 NOTE — CONSULT LETTER
[Dear  ___] : Dear  [unfilled], [Consult Letter:] : I had the pleasure of evaluating your patient, [unfilled]. [Please see my note below.] : Please see my note below. [Consult Closing:] : Thank you very much for allowing me to participate in the care of this patient.  If you have any questions, please do not hesitate to contact me. [Sincerely,] : Sincerely, [FreeTextEntry2] : Alysia Caballero MD [FreeTextEntry3] : Jonatan Abdul MD, FACS\par Clinical \par Dept. of Otolaryngology\par [facility]\par \par

## 2019-09-13 NOTE — PHYSICAL EXAM
[Well Nourished] : well nourished [Well Developed] : well developed [Normal Hearing] : hearing was normal [Thyroid Not Enlarged] : the thyroid was not enlarged [No LAD] : no lymphadenopathy [No Accessory Muscle Use] : no accessory muscle use [No Thyroid Nodules] : there were no palpable thyroid nodules [Clear to Auscultation] : lungs were clear to auscultation bilaterally [Normal Rate] : heart rate was normal  [Normal S1, S2] : normal S1 and S2 [Regular Rhythm] : with a regular rhythm [Pedal Pulses Normal] : the pedal pulses are present [No Edema] : there was no peripheral edema [Not Tender] : non-tender [Soft] : abdomen soft [Not Distended] : not distended [Post Cervical Nodes] : posterior cervical nodes [Anterior Cervical Nodes] : anterior cervical nodes [No Spinal Tenderness] : no spinal tenderness [Normal] : normal [Full ROM] : with full range of motion [Diminished Throughout Both Feet] : normal tactile sensation with monofilament testing throughout both feet [Cranial Nerves Intact] : cranial nerves 2-12 were intact [No Motor Deficits] : the motor exam was normal [No Tremors] : no tremors [de-identified] : AMBULATES WTIH WALKER  [de-identified] : left eye lid swelling [de-identified] : POOR RECENT MEMORY

## 2019-09-13 NOTE — HISTORY OF PRESENT ILLNESS
[FreeTextEntry1] : LAURENCE KEY is a 79 year old female with PMHx of mild dementia, cavernous sinus/pituitary mass-suspected, SDH s/p craniotomy, L eye blindness, depression, anxiety, HTN, HDL, Type 2 DM, adrenal adenoma here for endocrine follow up. \par \par Her current DM regimen is: Victoza 1.2mg daily, Metformin 1500mg daily, (500 in the morning and 1000mg at night)\par Lantus SoloSTAR 12-13 units.  Her A1C is well controlled, most recent A1C in Aug 2019 was 6.5%\par \par She had a history of diarrhea, she went to see Dr. Gutiérrez, and they found a  3 cm left adrenal mass. Additional subcentimeter adrenal nodules; findings are concerning for neoplastic disease.  Dr. Gutiérrez sent off blood work which included 24 hour catecholamines which were negative, Chromogranin A was very mildly elevated Chromogranin A 148 ng/dl (<93).  Gastrin level was elevated.  Serotonin and VIP within normal limits.  24 hour 5HIAA was done and was found to be within normal limits as well by me.  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  Her salivary cortisol level was also elevated to 140 ng/dl (normal <100).  She denies any history of osteoporosis. No easy bruising, no pigmented striae. The Hounsfield Unit  of the largest mass was 90. She established care with Dr. Mendoza, repeat salivary cortisol 152 ng/dl (<100 is normal) and 24 hour urine for free cortisol again is normal 17 mcg/24h.  She has an upcoming repeat MRI.  \par \par She also underwent a PET dotate scan which physiologic radiotracer activity in bilateral adrenal glands limits evaluation of a 3.1x2.7cm left adrenal mass and subcentimeter bilateral adrenal nodules which are unchanged as compared to CT-dated 12/9/2018.  \par Left left adrenal mass arises from the anterior limb of the left adrenal gland where there is increased DOTATATE activity which may be related to cheryl adrenal lesion vs. physiologic activity (SUV 18.4).  The reminder of the left adrenal mass does not demonstrate increased radiotracer activity.  Subcentimeter bilateral adrenal nodules are difficult to resolve on PET due to their small size and physiologic adrenal activity. \par \par She reports she had neuropathy, she is uncertain is she has nephropathy. \par \par She reports a prior history of heart attack or heart failure.  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  She recently had surgery on the left eye.  Going to follow up with eye doctor soon\par \par Regarding hypertension:She's taking amlodipine 5mg daily and metoprolol 25mg daily \par \par Regarding hyperlipidemia, she is currently on simvastatin 20mg daily \par \par Regarding elevated prolactin level, she had a prior history of pituitary lesion s/p surgery.  \par \par Regardiing hypothyroidism, she's currently taking LT4 50mcg daily.  \par

## 2019-09-13 NOTE — ASSESSMENT
[FreeTextEntry1] : LAURENCE KEY is a 79 year old female with PMHx of mild dementia, cavernous sinus/pituitary mass-suspected meningioma, SDH s/p craniotomy, L eye blindness, depression, anxiety, HTN, HDL, Type 2 DM presents for an initial evaluation of Type 2 diabetes mellitus.\par \par 1)Type 2 DM\par Well controlled, A1C at goal for patient's age\par Recommend to continue with lantus 13 units at bedtime, patient is unclaer of how much insulin she's taking. \par Recommend to continue with Victoza 1.2mg daily\par Recommend to continue with metformin 1500mg daily \par Check FSG 2-3 times daily\par \par 2)HTN\par Stable, continue with current regimen. Metoporlol 25mg 1/2 tablets once daily \par Amlodipine 5mg once daily  \par \par 3)HLD\par Continue with simvastatin 20mg daily \par \par 4)History of pituitary tumor\par Prolactin level mildly elevated, not going to repeat this visit.  \par \par 5) Hypothyroidism \par TSH normal March 2019, continue with current regimen.  \par Keep same medication\par \par 6)Adrenal incidentaloma\par AM cortisol was not suppressed x3 tests\par Salivary cortisol mildly elevated. \par Urine cortisol was normal. \par The Hounsfield unit of the largest nodule was 90. \par s/p MRI of abdomen and will be following up with Urology in 3 months, should the size increase urology team is considering surgical intervention. \par I'll repeat 24 hour urine cortisol and salivary cortisol test as the previous test is inconclusive.  \par DXA done for bone density and is within normal limits. \par \par \par \par Diabetes Health Care Maintenance\par - Opthalmology up to date: Yes\par -Podiatry up to date: Last seen in may 2018, referral given today 10/30/2018 \par -Antiplatelet agent: No\par  -Urine microalbumin: No recent labs. check today \par -ACE-I/ARB: No\par -Patient to call for persistent glucose < 70 or > 300\par -Discussed hypoglycemic protocol. \par -Yearly flu vaccine recommended \par \par EDUCATION: Reviewed 'ABC' of diabetes management (respective goals in parentheses): A1C (<7), blood pressure (<140/90), and cholesterol (LDL <100).\par \par FU in 2-3 months. \par \par  \par .  [Carbohydrate Consistent Diet] : carbohydrate consistent diet [Hypoglycemia Management] : hypoglycemia management [Diabetes Foot Care] : diabetes foot care [Long Term Vascular Complications] : long term vascular complications of diabetes [Action and use of Insulin] : action and use of short and long-acting insulin [Insulin Self-Administration] : insulin self-administration [Self Monitoring of Blood Glucose] : self monitoring of blood glucose [Injection Technique, Storage, Sharps Disposal] : injection technique, storage, and sharps disposal [Retinopathy Screening] : Patient was referred to ophthalmology for retinopathy screening

## 2019-09-13 NOTE — RESULTS/DATA
[Hologic] : hologic [L1 - L4] : L1 - L4 [T-Score ___] : T-score: [unfilled] [BMD ___ g/cm2] : BMD: [unfilled] g/cm2 [Z-Score ___] : Z-score: [unfilled] [FreeTextEntry2] : 09/13/2019

## 2019-09-17 NOTE — REASON FOR VISIT
[Subsequent Evaluation] : a subsequent evaluation for [Family Member] : family member [FreeTextEntry2] : follow up for Left otitis media with effusion and intranasal Left orbital mass/meningioma, possible myringotomy with tube placement, history of pituitary tumor removed 1992, anosmia since, Left eye sight gone 2010, Left eye removed July 2018 secondary to grown brain tumor/meningioma.

## 2019-09-17 NOTE — PROCEDURE
[Same] : same as the Pre Op Dx. [] : M & T [FreeTextEntry1] : L Eustachian tube compression secondary to a meningioma invading NP, causing secondary L OME. [FreeTextEntry4] : Phenol [FreeTextEntry6] : Serous effusion in L middle ear.  Moreno beveled tube placed.

## 2019-09-17 NOTE — PHYSICAL EXAM
[de-identified] : L OME persists [Midline] : trachea located in midline position [Normal] : no rashes

## 2019-09-17 NOTE — CONSULT LETTER
[Dear  ___] : Dear  [unfilled], [Please see my note below.] : Please see my note below. [Courtesy Letter:] : I had the pleasure of seeing your patient, [unfilled], in my office today. [Consult Closing:] : Thank you very much for allowing me to participate in the care of this patient.  If you have any questions, please do not hesitate to contact me. [Sincerely,] : Sincerely, [FreeTextEntry2] : Alysia Caballero MD [FreeTextEntry3] : Jonatan Abdul MD, FACS\par Clinical \par Dept. of Otolaryngology\par [facility]\par

## 2019-09-17 NOTE — HISTORY OF PRESENT ILLNESS
[de-identified] : 79 year old female follow up for Left otitis media with effusion and intranasal Left orbital mass/meningioma, possible myringotomy with tube placement, history of pituitary tumor removed 1992, anosmia since, Left eye sight gone 2010, Left eye removed July 2018 secondary to grown brain tumor/meningioma.  Patient states symptoms remain the same, continues to have otalgia and hearing loss.  States continues to have Right nostril rhinorrhea, mostly green-yellow colored, with constant sinus pain/pressure.  Reports uses Saline nasal spray some of the time, with moderate relief.

## 2019-09-24 NOTE — PHYSICAL EXAM
[General Appearance - Alert] : alert [General Appearance - In No Acute Distress] : in no acute distress [General Appearance - Well Nourished] : well nourished [General Appearance - Well-Appearing] : healthy appearing [Oriented To Time, Place, And Person] : oriented to person, place, and time [Impaired Insight] : insight and judgment were intact [Affect] : the affect was normal [Memory Recent] : recent memory was not impaired [Person] : oriented to person [Place] : oriented to place [Time] : oriented to time [Cranial Nerves Facial (VII)] : face symmetrical [Cranial Nerves Trigeminal (V)] : facial sensation intact symmetrically [Cranial Nerves Glossopharyngeal (IX)] : tongue and palate midline [Cranial Nerves Hypoglossal (XII)] : there was no tongue deviation with protrusion [Cranial Nerves Accessory (XI - Cranial And Spinal)] : head turning and shoulder shrug symmetric [Motor Handedness Right-Handed] : the patient is right hand dominant [Limited Balance] : the patient's balance was impaired [Outer Ear] : the ears and nose were normal in appearance [Hearing Threshold Finger Rub Not [place]] : hearing was normal [Oropharynx] : the oropharynx was normal [Neck Appearance] : the appearance of the neck was normal [Respiration, Rhythm And Depth] : normal respiratory rhythm and effort [Exaggerated Use Of Accessory Muscles For Inspiration] : no accessory muscle use [Heart Rate And Rhythm] : heart rate was normal and rhythm regular [Involuntary Movements] : no involuntary movements were seen [Skin Color & Pigmentation] : normal skin color and pigmentation [] : no rash [FreeTextEntry1] : came to visit via wheelchair

## 2019-09-24 NOTE — ASSESSMENT
[FreeTextEntry1] : 2019\par \par Hayder Hilliard MD\par Director of Neurooncology, Brain Tumor [place]\par [facility]\par [address], [address]\[place] [address]\par \par Re:	Yolis Chacko \par :	[birth year]\par \par Dear Hayder \par \par Thanks for referring to my clinic this 79-year-old woman who has a complex history of a brain tumor.  She was operated for supposedly a pituitary adenoma in  at the [facility] in [place] followed by radiation.  We do not have exact detail of those procedures.  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  The patient did not undergo any treatment until  where she had a subdural hematoma that was drained by Dr. Art Pérez and at that time, she also presented with seizure where she was put on antiepileptic drugs.  Her left-sided vision progressively went into blindness and in 2018 because of a significant proptosis of the left eye underwent enucleation of the left eye.  The exact circumstance of this decision making is not clear to me.  However, the patient was told that surgery on the meningioma would be too risky for her and therefore mostly a cosmetic surgery on the eye was performed to improve the amount of proptosis considering the fact that the vision was already gone of the left eye.  The patient was also provided with a prosthesis; however, because of the presence of the tumor, the prosthesis will create a proptosis and therefore the patient does not use the prosthesis.  \par \par At the same time, she also suffers from a significant left-sided periorbital pain for which she is referred to us for consideration of surgical resection of this tumor.  \par \par The past medical history is positive for hypertension, hypercholesterolemia, however, she does not have any cardiac ischemic history.  She is not taking any blood thinners.  \par \par The details of past medical history, social history and review of systems is in [employer].   \par \par Neurological examination reveals a patient who is in a wheelchair, but according to her daughter, the patient can walk at home, however, for long distances uses a wheelchair for the convenience.  She is completely oriented.  She has left visual loss.  No other gross cranial nerve deficits.  Sensory and motor examination of the extremities to the extent that I can test is grossly unremarkable.  \par \par Imaging:  I reviewed in detail the recent brain MRI showing a contrast-enhancing lesion in the spheno-orbital region with completion invasion into the orbit, encasement of the carotid artery and invasion to the sella.  This is characteristic of a meningioma.  Differential diagnosis between a radiation induced meningioma or a primary meningioma could be discussed.  Considering the fact that this patient had supposedly a pituitary adenoma could bring up the likelihood of an invasive pituitary adenoma; however, it is very unlikely for a pituitary adenoma to have such a significant invasion into the orbit.  \par \par Recommendations:  This 79-year-old woman presents with a significant spheno-orbital meningioma with complete invasion of the orbit, displacement of intraocular structures and encasement of the carotid artery with medial extension toward the sella.  As discussed above, a meningioma whether primary or radiation induced is clearly the primary diagnosis.  Because of the patient’s significant left-sided periorbital pain and the fact that the presence of the tumor inside the orbit does not allow her to use the prosthesis, I think it is reasonable to consider resection of this lesion.  I would, however, remain conservative when it comes to the invasion of the tumor around the cavernous sinus and carotid artery as the main goal of the surgery here would be to decrease the mass of the tumor in the orbit, hopefully relieve her periorbital pain, allow for a better prosthesis to be accommodated and also perform a good partial resection which will alleviate this patient’s symptoms for the next years to come.  I think the other concern would be the invasion of the tumor toward the contralateral side.  At this point, the contralateral optic nerve or optic chiasm are not threatened by the tumor; however, during the surgery if it seems that the midline part of the tumor can be resected safely, we will do so.  \par \par I suggested the patient see the oculoplastic surgeon for consideration of a better cosmesis of the eyelid as the lower eyelid is rotated on the mucosal side and it is not optimal.  We would also like to obtain medical and cardiac clearance and we would like to obtain an MRI with navigation protocol.  We will plan all these preliminary tests and set up presurgical testing for the surgery that I will schedule for the end of October.  I will likely get my ENT/skull base colleague, Dr. Delaney, involved in terms of the orbit reconstruction after resection of the tumor inside the orbit.  \par \par Thanks for sharing this challenging case with us.  I hope that we will be able to help her and improve her symptoms.  I discussed the risk of surgery including stroke, hematoma, infection, residual tumor with need for further treatment and remote risk of major neurological deficit.  The patient and the daughter are very eager to proceed, and we will plan according. \par \par Thanks for the referral. \par \par Sincerely,\par \par \par \par Cherie Culp MD, FACS\par \par \par IMPRESSION:\par 1. Left eye tumor that requires surgical intervention.\par 2. Brain tumor measures 6 cm (L) x 2.5 cm (W).\par \par \par PLAN:\par 1. Advised patient to follow up with eye surgeon Dr. Sage and fax report of visit to us.'\par 2. Medical and cardiac clearance prior to surgery.\par 3. PST.\par 4. Surgery tentative date end of 2019.\par 5. Advised patient not to take any blood thinners at least 10 days prior to surgery.\par 6. MRI brain w/wo - Navigation and sella protocol.\par 7. Advised pt and daughter that even after surgical resection of tumor that facial pain and headaches may still persist.\par

## 2019-09-24 NOTE — REASON FOR VISIT
[New Patient Visit] : a new patient visit [Referred By: _________] : Patient was referred by MARLEY [Family Member] : family member [FreeTextEntry3] : Patient and daughter speaks English fluently [TWNoteComboBox1] : Govind

## 2019-09-24 NOTE — REVIEW OF SYSTEMS
[Seizures] : convulsions [Anxiety] : anxiety [Depression] : depression [Eye Pain] : eye pain [Diarrhea] : diarrhea [Negative] : Heme/Lymph [FreeTextEntry2] : fatigue, weakness [de-identified] : headaches [FreeTextEntry3] : left eye excised 2018 [FreeTextEntry9] : muscle pain

## 2019-09-27 NOTE — PHYSICAL EXAM
[General Appearance - Alert] : alert [General Appearance - In No Acute Distress] : in no acute distress [Impaired Insight] : insight and judgment were intact [Affect] : the affect was normal [Person] : oriented to person [Place] : oriented to place [Remote Intact] : remote memory intact [Concentration Intact] : normal concentrating ability [Span Intact] : the attention span was normal [Naming Objects] : no difficulty naming common objects [Repeating Phrases] : no difficulty repeating a phrase [Fluency] : fluency intact [Comprehension] : comprehension intact [Reading] : reading intact [Cranial Nerves Oculomotor (III)] : extraocular motion intact [Cranial Nerves Trigeminal (V)] : facial sensation intact symmetrically [Cranial Nerves Glossopharyngeal (IX)] : tongue and palate midline [Cranial Nerves Facial (VII)] : face symmetrical [Cranial Nerves Accessory (XI - Cranial And Spinal)] : head turning and shoulder shrug symmetric [Cranial Nerves Hypoglossal (XII)] : there was no tongue deviation with protrusion [Involuntary Movements] : no involuntary movements were seen [Sensation Tactile Decrease] : light touch was intact [Limited Balance] : the patient's balance was impaired [Coordination - Dysmetria Impaired Finger-to-Nose Bilateral] : not present [Dysdiadochokinesia Bilaterally] : not present [Time] : disoriented to time [FreeTextEntry4] : reads with some difficulty [FreeTextEntry5] : Torres Martinez left ear; EOMI intact right eye [FreeTextEntry6] : subtle weakness RLE [FreeTextEntry8] : ambulates with walker [FreeTextEntry1] : left eye removed

## 2019-09-27 NOTE — DISCUSSION/SUMMARY
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 As she has already had a left eye enucleation, perhaps re-resection would not be unreasonable, although any intervention would be palliative and not expected to improve her major complaints, which are probably unrelated to this mass lesion. Nonetheless, a re-resection may very well improve her pain and would absolutely improve the cosmesis.\par COGNITIVE DECLINE – Given her age and other risk factors (DM, HTN, hyperlipidemia), I suspect cognitive decline is related to alzheimer’s. I agree with the recommendation for cognitive behavioral neurology input. Memantadine and the SSRI may help. \par SEIZURES – THESE have several potential etiologies, as the meningioma approaches the medial temporal lobe, it is possible that it, too, is contributing. The neurodegeneration and the history of subdural hematoma are more likely to be proximate causes. 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 There is no tumor in this region on the MRI and I would not expect the tumor to cause the mastoiditis. I think draining that fluid is a good idea. Apparently, they have seen ENT and it has been proposed. I am in favor of ENT draining that fluid.\par DISPO – All questions answered. They understand the importance of seeing Dr Bang on 10/14/19.\par

## 2019-09-27 NOTE — REASON FOR VISIT
[Consultation] : a consultation visit [Family Member] : family member [FreeTextEntry1] : brain tumor

## 2019-09-27 NOTE — HISTORY OF PRESENT ILLNESS
[FreeTextEntry1] : This is a pleasant 79 year old woman who presents today for consultation for meningioma. She is accompanied by her daughter Araceli, who is her HCP. \par She has been followed by Dr. Mar at [facility] and wishes to transfer care to [facility] for convenience of location and communication with other providers. \par \par Briefly, pt was diagnosed with pituitary tumor in 1982, and underwent resection at [facility] in 1992, eventually followed by radiation therapy. \par \par She began to develop vision loss in the left eye in 2010. MRI brain showed a cavernous sinus mass, which was presumed to be a radiation-induced meningioma. Surgery and radiation were discussed, but deferred due to potential for complications. \par \par She is s/p fall in 2013, when a subdural hemmorhage was surgically evacuated by Dr. Pérez. During her hospitalization, she had witnessed seizures and was started on keppra 750mg, eventually increased to 1000mg BID. \par \par In 2018, her left eye was surgically removed due to continued tumor growth and eye bulging. She continues to have left orbital and facial pain, controlled with pregabalin. \par \par Her daughter notes her cognition has declined in recent years and she was diagnosed with Alzheimers and started on memantine, which she feels has been ineffective. She is scheduled to see Dr. Bang in October for further management. \par \par She notes her mother has become increasingly forgetful, anxious, and depressed. \par \par They are willing to consider treatment options. \par \par

## 2019-10-08 NOTE — ASSESSMENT
[FreeTextEntry1] : 1. irregular bms,diarrhea at times with mucous, h/o symptoms for years, seen by  in the past for similar symptoms. will not pursue colonoscopy at this time as patient is scheduled for neurosurgery end of this month. no alarm symptoms, no weight loss, no bleeding, hgb stable\par \par plan : increase water intake,\par          cologuard for crc screeening\par           when patient cleared by neurosurgery, consider colonoscopy at this time\par \par 2. obesity\par \par plan diet and exercise for weight loss.

## 2019-10-08 NOTE — HISTORY OF PRESENT ILLNESS
[FreeTextEntry1] : 80 yo female with h/o chronic diarrhea for few  years,no brbpr, no melena. Daughter by bedside. patient  reports normal bms., brown.  patient scheduled for brain sugery 10/31/2019. patient with h/o adrenal tumor being followed by urology. no weight loss, no n/v no gerd.\par \par h/o colonoscopy done 2012  results not available.\par \par h/o seizure, few days ago,  followed by neurology.\par \par no family h/o  colon cancer

## 2019-10-08 NOTE — PHYSICAL EXAM
[General Appearance - Alert] : alert [General Appearance - Well Nourished] : well nourished [General Appearance - In No Acute Distress] : in no acute distress [General Appearance - Well Developed] : well developed [Auscultation Breath Sounds / Voice Sounds] : lungs were clear to auscultation bilaterally [Heart Sounds] : normal S1 and S2 [Bowel Sounds] : normal bowel sounds [Abdomen Soft] : soft [Abdomen Tenderness] : non-tender [Abnormal Walk] : normal gait [No CVA Tenderness] : no ~M costovertebral angle tenderness [Nail Clubbing] : no clubbing  or cyanosis of the fingernails [Oriented To Time, Place, And Person] : oriented to person, place, and time [Skin Color & Pigmentation] : normal skin color and pigmentation [] : no rash [Skin Lesions] : no skin lesions [FreeTextEntry1] : walks with walker

## 2019-10-08 NOTE — REVIEW OF SYSTEMS
[As Noted in HPI] : as noted in HPI [Dizziness] : dizziness [Limb Weakness] : limb weakness [Difficulty Walking] : difficulty walking [Fever] : no fever [Chills] : no chills [Chest Pain] : no chest pain [Cough] : no cough [SOB on Exertion] : no shortness of breath during exertion [Dysuria] : no dysuria [Arthralgias] : no arthralgias [Anxiety] : no anxiety [Skin Lesions] : no skin lesions [Muscle Weakness] : no muscle weakness [Easy Bleeding] : no tendency for easy bleeding [Easy Bruising] : no tendency for easy bruising

## 2019-10-11 PROBLEM — N18.3 CKD (CHRONIC KIDNEY DISEASE), STAGE III: Status: ACTIVE | Noted: 2018-06-20

## 2019-10-11 PROBLEM — D49.6 BRAIN TUMOR: Status: RESOLVED | Noted: 2019-01-01 | Resolved: 2019-01-01

## 2019-10-11 PROBLEM — Z92.29 HISTORY OF PNEUMOCOCCAL VACCINATION: Status: RESOLVED | Noted: 2018-12-14 | Resolved: 2019-01-01

## 2019-10-11 PROBLEM — J39.2 NASOPHARYNGEAL MASS: Status: ACTIVE | Noted: 2019-01-01

## 2019-10-11 PROBLEM — Z23 NEED FOR INFLUENZA VACCINATION: Status: ACTIVE | Noted: 2018-09-28

## 2019-10-11 PROBLEM — Z01.419 WELL FEMALE EXAM WITH ROUTINE GYNECOLOGICAL EXAM: Status: RESOLVED | Noted: 2019-01-01 | Resolved: 2019-01-01

## 2019-10-11 PROBLEM — Z87.09 HISTORY OF NASAL DISCHARGE: Status: RESOLVED | Noted: 2019-01-01 | Resolved: 2019-01-01

## 2019-10-11 PROBLEM — M79.2 NEUROPATHIC PAIN: Status: RESOLVED | Noted: 2017-12-22 | Resolved: 2019-01-01

## 2019-10-11 PROBLEM — Z87.898 HISTORY OF DIARRHEA: Status: RESOLVED | Noted: 2018-10-02 | Resolved: 2019-01-01

## 2019-10-11 PROBLEM — Z87.19 HISTORY OF EPIGASTRIC PAIN: Status: RESOLVED | Noted: 2018-11-27 | Resolved: 2019-01-01

## 2019-10-11 PROBLEM — E66.9 OBESITY (BMI 30-39.9): Status: ACTIVE | Noted: 2017-12-13

## 2019-10-11 PROBLEM — K52.9 CHRONIC DIARRHEA: Status: ACTIVE | Noted: 2017-12-13

## 2019-10-11 PROBLEM — Z86.69 HISTORY OF SEIZURE DISORDER: Status: RESOLVED | Noted: 2017-10-23 | Resolved: 2019-01-01

## 2019-10-11 PROBLEM — Z87.720 HISTORY OF CONGENITAL ABNORMALITY OF EYE: Status: RESOLVED | Noted: 2019-01-01 | Resolved: 2019-01-01

## 2019-10-11 PROBLEM — F33.1 MODERATE EPISODE OF RECURRENT MAJOR DEPRESSIVE DISORDER: Status: RESOLVED | Noted: 2017-10-23 | Resolved: 2019-01-01

## 2019-10-11 PROBLEM — R19.8 IRREGULAR BOWEL HABITS: Status: RESOLVED | Noted: 2019-01-01 | Resolved: 2019-01-01

## 2019-10-14 PROBLEM — F51.04 CHRONIC INSOMNIA: Status: ACTIVE | Noted: 2019-01-01

## 2019-10-14 PROBLEM — E53.8 VITAMIN B12 DEFICIENCY: Status: ACTIVE | Noted: 2019-01-01

## 2019-10-14 PROBLEM — Z87.898 HISTORY OF UNSTEADY GAIT: Status: RESOLVED | Noted: 2018-04-12 | Resolved: 2019-01-01

## 2019-10-14 PROBLEM — R44.3 HALLUCINATIONS: Status: ACTIVE | Noted: 2019-01-01

## 2019-10-14 NOTE — HISTORY OF PRESENT ILLNESS
[FreeTextEntry1] : HPI: 80yo RHAAW, with PMH, of DM, HTN, HLD, Hypothyroidism, PMHof pituitary adenoma s/p sx and RTx, L side left cavernous sinus meningioma, compressing L ON, with loss of vision and eye in L side, here for evaluation of cognitive decline. \par \par Language: Creole-speaking Patient and daughter speaks English fluently. \par \par PMH:\par Brain Tumor, per Dr. Watts 9/19/2019:\par Tumor has been present since 2010. She has a prior Transphenoidal pituitary tumor resection in 1992 done at [facility]. Surgery was followed by RT. Patient also developed seizures after an emergency surgery for SDH after a fall in 2013 (by Dr. Pérez) and remains on Keppra for occasional left upper extremity twitching. Her left eye was removed at 7/8/2018 at [facility] by Dr. Sage because pt became blind in left eye and the eye started protruding from the eye socket as the tumor grew. Patient has an eye prosthesis however she is unable to wear because it keeps popping out when place in eye socket. Per daughter patient also developed difficulty hearing from left ear and very diminished sense of smell since then. She recently had a tube placed in left ear and reports that her hearing has improved since then. The brain tumor has since then been monitored with neuro imaging by Dr. Alma Delia Cox, neuro oncologist at [facility]. \par Per daughter patient also has an adrenal tumor that is being managed by Dr. Shree Mendoza, [facility], [address], [facility] [address], [place] [address].\par Surgical history includes cholecystectomy, hysterectomy 1982 , left oophorectomy 1982, tubal ligation 1972. \par Scheduled NSX for meningioma removal on 10.31.\par \par Since 2014, family has noted some forgetfulness, mostly recent events and forgetting names of a few people.\par This has been very slow, and only in the last 2 years her daughter has noted more STM issues, confusing the present with the past.\par She has had visual hallucinations, seeing people that are not there. Of note, her R eye vision was gone in 2010, and removal of the R eye happened in 2018.\par She also has Auditory hallucinations, and things at time the TV is talking to her.\par She tends to get agitated and frustrated about her condition and lots of other issues. \par She is asking for clothes that she thinks she is missing, or confuses other people's clothes as hers.\par Per daughter, there are fluctuations of her cognitive state.\par \par Sleep: chronic insomnia, at times she does not sleep during the night, naps a lot in the day.\par \par Appetite: elevated, wants to eat all the times.\par \par Motor symptoms: poor balance, for many years, tends to fall frequently, bw and fw. Uses a cane. Has chronic sciatica pain on R side. \par Poor activities, spends a lot of time in bed.\par \par B/B: chronic diarrhea, uses diapers.\par \par Psychiatric symptoms: as above. PMH of depression and one SA. \par \par ADL: limited, can wash herself but needs assistance for showering; can eat; can dress but needs physical help\par IADL: poor\par CDR: 1.5.\par \par Professional status: retired, former RN.\par \par PCP and other physicians:\par -PCP: Dr. Vinson, [address], [facility] [address], [phone].\par -Neuro: [facility]\par -NSX: Dedashti\par -Endo: Pearl.\par \par Workup done: MRI brain, labs: low B12, elevated gastrin and chromogranin.\par \par Started on Memantine 5mg BID by Joseph Mckeon, no benefits, pt seems to be more confused, and has more hallucinations since then.\par Tried Aricept prior, not working. \par

## 2019-10-14 NOTE — REASON FOR VISIT
[Family Member] : family member [Initial Evaluation] : an initial evaluation [FreeTextEntry1] : LOAD

## 2019-10-14 NOTE — PHYSICAL EXAM
[General Appearance - In No Acute Distress] : in no acute distress [General Appearance - Alert] : alert [Impaired Insight] : insight and judgment were intact [Affect] : the affect was normal [Person] : oriented to person [Remote Intact] : remote memory intact [Registration Intact] : recent registration memory intact [Concentration Intact] : normal concentrating ability [Naming Objects] : no difficulty naming common objects [Repeating Phrases] : no difficulty repeating a phrase [Visual Intact] : visual attention was ~T not ~L decreased [Writing A Sentence] : no difficulty writing a sentence [Comprehension] : comprehension intact [Fluency] : fluency intact [Reading] : reading intact [Past History] : adequate knowledge of personal past history [Date / Time ___ / 5] : date / time [unfilled] / 5 [Vocabulary] : adequate range of vocabulary [Serial Sevens ___/5] : serial sevens [unfilled] / 5 [Registration ___ / 3] : registration [unfilled] / 3 [Place ___ / 5] : place [unfilled] / 5 [Naming 2 Objects ___ / 2] : naming two objects [unfilled] / 2 [Repeating a Sentence ___ / 1] : repeating a sentence [unfilled] / 1 [3-stage Verbal Command ___ / 3] : three-stage verbal command [unfilled] / 3 [Written Command ___ / 1] : written command [unfilled] / 1 [Copy a Design ___ / 1] : copy a design [unfilled] / 1 [Cranial Nerves Trigeminal (V)] : facial sensation intact symmetrically [Cranial Nerves Optic (II)] : visual acuity intact bilaterally,  visual fields full to confrontation, pupils equal round and reactive to light [Cranial Nerves Oculomotor (III)] : extraocular motion intact [Cranial Nerves Facial (VII)] : face symmetrical [Cranial Nerves Vestibulocochlear (VIII)] : hearing was intact bilaterally [Cranial Nerves Accessory (XI - Cranial And Spinal)] : head turning and shoulder shrug symmetric [Cranial Nerves Glossopharyngeal (IX)] : tongue and palate midline [Motor Strength] : muscle strength was normal in all four extremities [Cranial Nerves Hypoglossal (XII)] : there was no tongue deviation with protrusion [No Muscle Atrophy] : normal bulk in all four extremities [Sensation Tactile Decrease] : light touch was intact [Balance] : balance was intact [Optic Disc Abnormality] : the optic disc were normal in size and color [Extraocular Movements] : extraocular movements were intact [Full Visual Field] : full visual field [Outer Ear] : the ears and nose were normal in appearance [Neck Appearance] : the appearance of the neck was normal [Oropharynx] : the oropharynx was normal [Thyroid Diffuse Enlargement] : the thyroid was not enlarged [Neck Cervical Mass (___cm)] : no neck mass was observed [Jugular Venous Distention Increased] : there was no jugular-venous distention [Thyroid Nodule] : there were no palpable thyroid nodules [Writing a Sentence ___ / 1] : write sentence [unfilled] / 1 [Total Score ___ / 30] : the patient achieved a score of [unfilled] /30 [Recall ___ / 3] : recall [unfilled] / 3 [Motor Handedness Right-Handed] : the patient is right hand dominant [Sensation Pain / Temperature Decrease] : pain and temperature was intact [1+] : Brachioradialis left 1+ [0] : Ankle jerk left 0 [PERRL With Normal Accommodation] : pupils were equal in size, round, reactive to light, with normal accommodation [Sclera] : the sclera and conjunctiva were normal [Auscultation Breath Sounds / Voice Sounds] : lungs were clear to auscultation bilaterally [Heart Rate And Rhythm] : heart rate was normal and rhythm regular [Heart Sounds] : normal S1 and S2 [Murmurs] : no murmurs [Heart Sounds Gallop] : no gallops [Arterial Pulses Carotid] : carotid pulses were normal with no bruits [Heart Sounds Pericardial Friction Rub] : no pericardial rub [Full Pulse] : the pedal pulses are present [Edema] : there was no peripheral edema [Bowel Sounds] : normal bowel sounds [Abdomen Tenderness] : non-tender [Abdomen Soft] : soft [Abdomen Mass (___ Cm)] : no abdominal mass palpated [No CVA Tenderness] : no ~M costovertebral angle tenderness [No Spinal Tenderness] : no spinal tenderness [Abnormal Walk] : normal gait [Motor Tone] : muscle strength and tone were normal [Musculoskeletal - Swelling] : no joint swelling seen [Nail Clubbing] : no clubbing  or cyanosis of the fingernails [Skin Color & Pigmentation] : normal skin color and pigmentation [Skin Turgor] : normal skin turgor [] : no rash [Place] : disoriented to place [Short Term Intact] : short term memory impaired [Time] : disoriented to time [Span Intact] : the attention span was decreased [Romberg's Sign] : Romberg's sign was negtive [Current Events] : inadequate knowledge of current events [Dysesthesia] : no dysesthesia [Allodynia] : no ~T allodynia present [Past-pointing] : there was no past-pointing [Tremor] : no tremor present [Hyperesthesia] : no hyperesthesia [Plantar Reflex Right Only] : normal on the right [Plantar Reflex Left Only] : normal on the left [FreeTextEntry5] : Slight asymmetry of NLF, L lower than R, with mild dynamic deficit [FreeTextEntry4] : Mental Status Exam\par Presidents: 3/5\par Exam very limited by poor vision\par Alternating Pattern: ok\par Spiral: ok\par Clock: 2/3\par Repetition: ok\par R/L discrimination on self and examiner: ok\par Cross-line commands: ok\par Praxis: grossly intact. [FreeTextEntry7] : reduced vib and position sense in LE [FreeTextEntry6] : slight deficits of LLE-foot dorsal flexion [FreeTextEntry1] : R

## 2019-10-14 NOTE — ASSESSMENT
[FreeTextEntry1] : Assessment:\par 80yo RH AAW, with above pmh and concerns for ongoing cognitive decline. STM and VS deficits are present, and mild. Pt partially oriented. Slow processing as well is diffuse.\par Mild L side facial and LLE motor deficit, NOS. Will have MRI repeat in a week or so before the sx. Of note, on MRI from June 2019, there are a few vascular lacunar lesions on the R frontal cortical-subcortical areas.\par Gait impairment is multifactorial, possible DM neuropathy, and deconditioning is a factor.\par Sleep pattern issues. 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 \par \par A thorough discussion was entertained with the patient/caregiver regarding the use of psychoactive medications, their possible benefits and AE profile, including the risk of cardiovascular complications.\par We discussed the benefits of being active, physically and mentally, and the need to to establish a routine in this respect.\par Driving abilities and firearms possession and use were discussed, in relation to progression of the cognitive decline, and the need to assess them periodically.\par Patient/caregiver understand and agree with the plan.\par

## 2019-10-16 PROBLEM — R00.2 PALPITATION: Status: ACTIVE | Noted: 2019-01-01

## 2019-10-16 NOTE — ASSESSMENT
[FreeTextEntry1] : 79 year old [place] female with history of sinus/pituitary brain tumor excision (1982), s/p subdural hematoma -> craniotomy in 2013, poor gait and balance, cognitive impairment, left eye blindness and decreased vision in the right eye, seizure disorder, HTN, Hyperlipidemia, type 2 diabetes and central obesity.      \par \par Assessment/ Plan\par \par Blood pressure today is in acceptable control\par EKG in sinus rhythm, stable and unchanged\par Recent lab work demonstrated lipid panel in good control- drawn on May 14, 2019\par \par Total: 152\par HDL    36\par LDL 79\par \par Hgb A1C  6.1\par \par PLAN\par Continue on Amlodipine 5 mg daily and Metoprolol 25 mg 1/2 tablet BID\par Continue Simvastatin 20 mg QHS\par Continue with endocrine regimen:\par Victoza, Metformin, Lantus\par \par Patient supervised closely by gerontology and endocrinology\par \par \par Follow up 6 months with cardiology\par \par

## 2019-10-16 NOTE — ASSESSMENT
[FreeTextEntry1] : 79 year old Indonesian female with history of sinus/pituitary brain tumor excision (1982), s/p subdural hematoma -> craniotomy in 2013, poor gait and balance, cognitive impairment, left eye blindness and decreased vision in the right eye, seizure disorder, HTN, Hyperlipidemia, type 2 diabetes and central obesity.      \par \par Assessment/ Plan\par \par Blood pressure today is in acceptable control\par EKG in sinus rhythm, stable and unchanged\par Recent lab work demonstrated lipid panel in good control- drawn on May 14, 2019\par \par Total: 152\par HDL    36\par LDL 79\par \par Hgb A1C  6.1\par \par PLAN\par Continue on Amlodipine 5 mg daily and Metoprolol 25 mg 1/2 tablet BID\par Continue Simvastatin 20 mg QHS\par Continue with endocrine regimen:\par Victoza, Metformin, Lantus\par \par Patient supervised closely by gerontology and endocrinology\par \par \par Follow up 6 months with cardiology\par \par

## 2019-10-17 NOTE — H&P PST ADULT - NSICDXPROBLEM_GEN_ALL_CORE_FT
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    Pre-op education provided - all questions answered   Cardiac Eval; pending     Problem: T2DM (type 2 diabetes mellitus)  Assessment and Plan: Finger stick on day of surgery     Problem: HTN (hypertension)  Assessment and Plan: continue on antihypertensive medications     Problem: History of seizure disorder  Assessment and Plan: continue on current  regimen     Problem: Need for prophylactic measure  Assessment and Plan:

## 2019-10-17 NOTE — H&P PST ADULT - EYES COMMENTS
Left lower lid is everted with conjunctival exposure. Incomplete eye closure due to eversion of lower lid

## 2019-10-17 NOTE — H&P PST ADULT - NSANTHOSAYNRD_GEN_A_CORE
No. KODAK screening performed.  STOP BANG Legend: 0-2 = LOW Risk; 3-4 = INTERMEDIATE Risk; 5-8 = HIGH Risk

## 2019-10-17 NOTE — H&P PST ADULT - ASSESSMENT
AMYI VTE 2.0 SCORE [CLOT updated 2019]    AGE RELATED RISK FACTORS                                                       MOBILITY RELATED FACTORS  [ ] Age 41-60 years                                            (1 Point)                    [ ] Bed rest                                                        (1 Point)  [ ] Age: 61-74 years                                           (2 Points)                  [ ] Plaster cast                                                   (2 Points)  [x ] Age= 75 years                                              (3 Points)                    [ ] Bed bound for more than 72 hours                 (2 Points)    DISEASE RELATED RISK FACTORS                                               GENDER SPECIFIC FACTORS  [ x] Edema in the lower extremities                       (1 Point)              [ ] Pregnancy                                                     (1 Point)  [ ] Varicose veins                                               (1 Point)                     [ ] Post-partum < 6 weeks                                   (1 Point)             [x ] BMI > 25 Kg/m2                                            (1 Point)                     [ ] Hormonal therapy  or oral contraception          (1 Point)                 [ ] Sepsis (in the previous month)                        (1 Point)               [ ] History of pregnancy complications                 (1 point)  [ ] Pneumonia or serious lung disease                                               [ ] Unexplained or recurrent                     (1 Point)           (in the previous month)                               (1 Point)  [ ] Abnormal pulmonary function test                     (1 Point)                 SURGERY RELATED RISK FACTORS  [ ] Acute myocardial infarction                              (1 Point)               [ ]  Section                                             (1 Point)  [ ] Congestive heart failure (in the previous month)  (1 Point)      [ ] Minor surgery                                                  (1 Point)   [ ] Inflammatory bowel disease                             (1 Point)               [ ] Arthroscopic surgery                                        (2 Points)  [ ] Central venous access                                      (2 Points)                [x ] General surgery lasting more than 45 minutes (2 points)  [ ] Malignancy- Present or previous                   (2 Points)                [ ] Elective arthroplasty                                         (5 points)    [ ] Stroke (in the previous month)                          (5 Points)                                                                                                                                                           HEMATOLOGY RELATED FACTORS                                                 TRAUMA RELATED RISK FACTORS  [ ] Prior episodes of VTE                                     (3 Points)                [ ] Fracture of the hip, pelvis, or leg                       (5 Points)  [ ] Positive family history for VTE                         (3 Points)             [ ] Acute spinal cord injury (in the previous month)  (5 Points)  [ ] Prothrombin 05398 A                                     (3 Points)               [ ] Paralysis  (less than 1 month)                             (5 Points)  [ ] Factor V Leiden                                             (3 Points)                  [ ] Multiple Trauma within 1 month                        (5 Points)  [ ] Lupus anticoagulants                                     (3 Points)                                                           [ ] Anticardiolipin antibodies                               (3 Points)                                                       [ ] High homocysteine in the blood                      (3 Points)                                             [ ] Other congenital or acquired thrombophilia      (3 Points)                                                [ ] Heparin induced thrombocytopenia                  (3 Points)                                     Total Score [   7       ]

## 2019-10-17 NOTE — H&P PST ADULT - NSICDXPASTMEDICALHX_GEN_ALL_CORE_FT
PAST MEDICAL HISTORY:  Adrenal incidentaloma     Arthritis has pain in hips and back    Brain tumor sellar mass.  hx of L eye blidness/proptosis from a ?tumor    Chronic depression     Frequent falls     Gait instability     GERD (gastroesophageal reflux disease)     History of seizure disorder     HLD (Hyperlipidemia)     Teller (hard of hearing)     Hypertension     Irritable bowel syndrome with diarrhea     Legally blind     Non-Insulin Dependent Diabetes Mellitus diagnosed in 1992    Sciatica both hips    Sedentary lifestyle

## 2019-10-17 NOTE — H&P PST ADULT - NSICDXPASTSURGICALHX_GEN_ALL_CORE_FT
PAST SURGICAL HISTORY:  H/O: Hysterectomy abdominal with right oophorectomy in 1982    History of eye enucleation 2018 s/p Left orbital enucleation    Pituitary Tumor excision in 1992    S/P Cholecystectomy open in 1979    S/P myringotomy with insertion of tube 2019    S/P Tubal Ligation in 1978    Traumatic subdural hematoma s/p evacuation in 2013

## 2019-10-17 NOTE — H&P PST ADULT - HISTORY OF PRESENT ILLNESS
79 y/o Filipino female presents to PST accompanied by daughter with H/O HTN, T2DM, Hyperlipidemia, Sinus/pituitary brain tumor s/p excision (1982), s/p fall ->subdural hematoma s/p craniotomy  (2013), seizure disorder,  poor gait and balance, dementia, C/O Skull base meningioma since 2010, it has gradually enlarged in size over the years, s/p left orbital enucleation (2018). The skull base meningioma has experienced significant growth and now involving the cavernous sinus and wrapping around supraclinoid segments of left ICA, expending to orbit and filling it's content from apex to anterior portion of the orbit. 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

## 2019-10-24 PROBLEM — K58.0 IRRITABLE BOWEL SYNDROME WITH DIARRHEA: Chronic | Status: ACTIVE | Noted: 2019-01-01

## 2019-10-24 PROBLEM — R26.81 UNSTEADINESS ON FEET: Chronic | Status: ACTIVE | Noted: 2019-01-01

## 2019-10-24 PROBLEM — F32.9 MAJOR DEPRESSIVE DISORDER, SINGLE EPISODE, UNSPECIFIED: Chronic | Status: ACTIVE | Noted: 2019-01-01

## 2019-10-24 PROBLEM — E27.8 OTHER SPECIFIED DISORDERS OF ADRENAL GLAND: Chronic | Status: ACTIVE | Noted: 2019-01-01

## 2019-10-24 PROBLEM — K21.9 GASTRO-ESOPHAGEAL REFLUX DISEASE WITHOUT ESOPHAGITIS: Chronic | Status: ACTIVE | Noted: 2019-01-01

## 2019-10-24 PROBLEM — Z86.69 PERSONAL HISTORY OF OTHER DISEASES OF THE NERVOUS SYSTEM AND SENSE ORGANS: Chronic | Status: ACTIVE | Noted: 2019-01-01

## 2019-10-24 PROBLEM — H91.90 UNSPECIFIED HEARING LOSS, UNSPECIFIED EAR: Chronic | Status: ACTIVE | Noted: 2019-01-01

## 2019-10-24 PROBLEM — R29.6 REPEATED FALLS: Chronic | Status: ACTIVE | Noted: 2019-01-01

## 2019-10-24 PROBLEM — H54.8 LEGAL BLINDNESS, AS DEFINED IN USA: Chronic | Status: ACTIVE | Noted: 2019-01-01

## 2019-10-24 PROBLEM — Z91.89 OTHER SPECIFIED PERSONAL RISK FACTORS, NOT ELSEWHERE CLASSIFIED: Chronic | Status: ACTIVE | Noted: 2019-01-01

## 2019-10-29 NOTE — PHYSICAL EXAM
[Awake] : awake [Alert] : alert [Acute Distress] : no acute distress [Mass] : no breast mass [Nipple Discharge] : no nipple discharge [Axillary LAD] : no axillary lymphadenopathy [Soft] : soft [Tender] : non tender [Oriented x3] : oriented to person, place, and time [Normal] : vagina [No Bleeding] : there was no active vaginal bleeding [Absent] : absent [Adnexa Absent] : absent bilaterally

## 2019-10-29 NOTE — COUNSELING
[Breast Self Exam] : breast self exam [Nutrition] : nutrition [Vitamins/Supplements] : vitamins/supplements

## 2019-10-30 PROBLEM — Z01.810 PREOP CARDIOVASCULAR EXAM: Status: ACTIVE | Noted: 2018-07-06

## 2019-10-30 NOTE — DISCUSSION/SUMMARY
[Optimized for Surgery] : the patient is optimized for surgery [As per surgery] : as per surgery [FreeTextEntry1] : 78 yo F pre- op Neurosurgery procedure \par \par c/o palpitations on 10/16/2019 and since then  Holter received 10/30/2019: 6 beats PAT, Ventricular bigeminy, no significant arrhythmia. No active CHF,  CAD, ischemia\par \par EKG stable \par BP stable\par \par no cardiac contraindication planned neurosurgical procedure.\par \par

## 2019-10-30 NOTE — HISTORY OF PRESENT ILLNESS
[Preoperative Visit] : for a medical evaluation prior to surgery [Scheduled Procedure ___] : a [unfilled] [Date of Surgery ___] : on [unfilled] [Stable] : Stable [Electrocardiogram] : ~T an ECG ~C was performed [Echocardiogram] : ~T an echocardiogram ~C was performed [de-identified] : cardiac CT with nonobs CAD [FreeTextEntry1] : 79  year old [place] female with history of sinus/pituitary brain tumor excision (1982), s/p subdural hematoma -> craniotomy in 2013, poor gait and balance, cognitive impairment, left eye blindness and decreased vision in the right eye, seizure disorder, HTN, Hyperlipidemia, type 2 diabetes and central obesity.  s/p enucleation of her left eye with orbitotomy of malignant orbital tumor and removal of orbital bone at [facility] in [facility].\par \par cardiac CT in February of 2018. Calcium score was zero. She was found to have a normal coronary CT angiogram with left coronary artery dominance. \par \par Patient is conversant, good historian with unsteady gait secondary to brain tumor.\par \par F/U 10/16/2019\par here  for pre-op evaluation \par Ekg sinus unchanged; BP stable\par \par recent complains of significant palpitations.\par \par ROS: Denies Chest pain, dyspnea, dizziness or syncope.\par \par

## 2019-10-30 NOTE — REVIEW OF SYSTEMS
[Feeling Fatigued] : feeling fatigued [Palpitations] : palpitations [Negative] : Heme/Lymph [Eyeglasses] : not currently wearing eyeglasses [Chest Pain] : no chest pain [Change In The Stool] : no change in stool [Incontinence] : no incontinence

## 2019-10-30 NOTE — DISCUSSION/SUMMARY
[Optimized for Surgery] : the patient is optimized for surgery [As per surgery] : as per surgery [FreeTextEntry1] : 80 yo F pre- op Neurosurgery procedure \par \par c/o palpitations on 10/16/2019 and since then  Holter received 10/30/2019: 6 beats PAT, Ventricular bigeminy, no significant arrhythmia. No active CHF,  CAD, ischemia\par \par EKG stable \par BP stable\par \par no cardiac contraindication planned neurosurgical procedure.\par \par

## 2019-10-30 NOTE — HISTORY OF PRESENT ILLNESS
[Preoperative Visit] : for a medical evaluation prior to surgery [Scheduled Procedure ___] : a [unfilled] [Date of Surgery ___] : on [unfilled] [Stable] : Stable [Electrocardiogram] : ~T an ECG ~C was performed [Echocardiogram] : ~T an echocardiogram ~C was performed [de-identified] : cardiac CT with nonobs CAD [FreeTextEntry1] : 79  year old [place] Islander female with history of sinus/pituitary brain tumor excision (1982), s/p subdural hematoma -> craniotomy in 2013, poor gait and balance, cognitive impairment, left eye blindness and decreased vision in the right eye, seizure disorder, HTN, Hyperlipidemia, type 2 diabetes and central obesity.  s/p enucleation of her left eye with orbitotomy of malignant orbital tumor and removal of orbital bone at [facility] in [facility].\par \par cardiac CT in February of 2018. Calcium score was zero. She was found to have a normal coronary CT angiogram with left coronary artery dominance. \par \par Patient is conversant, good historian with unsteady gait secondary to brain tumor.\par \par F/U 10/16/2019\par here  for pre-op evaluation \par Ekg sinus unchanged; BP stable\par \par recent complains of significant palpitations.\par \par ROS: Denies Chest pain, dyspnea, dizziness or syncope.\par \par

## 2019-10-31 NOTE — BRIEF OPERATIVE NOTE - NSICDXBRIEFPROCEDURE_GEN_ALL_CORE_FT
PROCEDURES:  Craniotomy for resection of tumor of left side of brain 31-Oct-2019 18:03:09  Nabeel Velez

## 2019-10-31 NOTE — PROGRESS NOTE ADULT - ASSESSMENT
79 YO F with PMH of  HTN, T2DM, Hyperlipidemia, Sinus/pituitary brain tumor s/p excision (1982), s/p fall ->subdural hematoma s/p craniotomy  (2013), seizure disorder,  poor gait and balance, dementia, C/O Skull base meningioma since 2010, it has gradually enlarged in size over the years, s/p left orbital enucleation (2018). The skull base meningioma has experienced significant growth and now involving the cavernous sinus and wrapping around supraclinoid segments of left ICA, expending to orbit and filling it's content from apex to anterior portion of the orbit. 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 79 YO F with  Skull base meningioma since 2010, it has gradually enlarged in size over the years, s/p left orbital enucleation (2018). The skull base meningioma has experienced significant growth and now involving the cavernous sinus and wrapping around supraclinoid segments of left ICA, expending to orbit and filling it's content from apex to anterior portion of the orbit. 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

## 2019-10-31 NOTE — PROGRESS NOTE ADULT - ASSESSMENT
ASSESSMENT/PLAN:    NEURO:    Neurochecks q1 hrs,  CT Head in the AM,  Decadron Taper   Activity: [] mobilize as tolerated [] Bedrest [] PT [] OT [] PMNR    PULM:  O2 sat > 92%    CV:  SBP goal 100 - 160  MAP > 65    RENAL:  Fluids:  NS @ 75    GI:    Diet: advance diet as tolerated   GI prophylaxis [] not indicated [] PPI [] other:  Bowel regimen [x] colace [x] senna [] other:    ENDO:   Goal euglycemia (-180)    HEME/ONC:  VTE prophylaxis: [] SCDs [] chemoprophylaxis [x] hold chemoprophylaxis due to:   [] high risk of DVT/PE on admission due to:    ID:  afebrile     SOCIAL/FAMILY:  [x] awaiting [] updated at bedside [] family meeting    CODE STATUS:  [x] Full Code [] DNR [] DNI [] Palliative/Comfort Care    DISPOSITION:  [x] ICU [] Stroke Unit [] Floor [] EMU [] RCU [] PCU    [x] Patient is at high risk of neurologic deterioration/death due to:  seziures, hemorrhage, herniation       Time spent: 45 critical care minutes    Contact: [phone] 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 on 10/31/19   patient had CTA post op     NEURO:    Neurochecks q1 hrs,  CT Head in the AM,  Decadron Taper   keppra for seziure ppx   Activity: [] mobilize as tolerated [x] Bedrest [] PT [] OT [] PMNR    PULM:  O2 sat > 92%    CV:  SBP goal 100 - 160  HTN: c/w beta blockers, amlodipine   HLD - c/w statin     RENAL:  Fluids:  NS @ 75    GI:    Diet: dysphagia when more awake   GI prophylaxis [] not indicated [] PPI [] other:  Bowel regimen [x] colace [x] senna [] other:    ENDO:   Goal euglycemia (-180)    HEME/ONC:  VTE prophylaxis: [] SCDs [] chemoprophylaxis [x] hold chemoprophylaxis due to: fresh postop     ID:  afebrile     SOCIAL/FAMILY:  [x] awaiting [] updated at bedside [] family meeting    CODE STATUS:  [x] Full Code [] DNR [] DNI [] Palliative/Comfort Care    DISPOSITION:  [x] ICU [] Stroke Unit [] Floor [] EMU [] RCU [] PCU    [x] Patient is at high risk of neurologic deterioration/death due to:  seziures, hemorrhage, herniation       Time spent: 45 critical care minutes    Contact: [phone]

## 2019-10-31 NOTE — PRE-ANESTHESIA EVALUATION ADULT - NSANTHPEFT_GEN_ALL_CORE
General: Alert and oriented x 3, well-groomed  Heart: RRR  Lungs: CTABL  Neuro: Grossly intact General: Alert and oriented x 3, well-groomed, L eye with tumor protruding  Heart: RRR  Lungs: CTABL  Neuro: Grossly intact

## 2019-10-31 NOTE — PROGRESS NOTE ADULT - SUBJECTIVE AND OBJECTIVE BOX
ENT ISSUE/POD: S/P Left  orbital craniotomy for meningioma with left eye closure POD # 0.    HPI: 77 YO F with PMH of  HTN, T2DM, Hyperlipidemia, Sinus/pituitary brain tumor s/p excision (1982), s/p fall ->subdural hematoma s/p craniotomy  (2013), seizure disorder,  poor gait and balance, dementia, C/O Skull base meningioma since 2010, it has gradually enlarged in size over the years, s/p left orbital enucleation (2018). The skull base meningioma has experienced significant growth and now involving the cavernous sinus and wrapping around supraclinoid segments of left ICA, expending to orbit and filling it's content from apex to anterior portion of the orbit. 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     PAST MEDICAL & SURGICAL HISTORY:  History of seizure disorder  Irritable bowel syndrome with diarrhea  [place] (hard of hearing)  GERD (gastroesophageal reflux disease)  Frequent falls  Gait instability  Chronic depression  Legally blind  Sedentary lifestyle  Adrenal incidentaloma  Brain tumor: sellar mass.  hx of L eye blidness/proptosis from a ?tumor  Sciatica: both hips  Arthritis: has pain in hips and back  HLD (Hyperlipidemia)  Non-Insulin Dependent Diabetes Mellitus: diagnosed in 1992  Hypertension  S/P myringotomy with insertion of tube: 2019  History of eye enucleation: 2018 s/p Left orbital enucleation  Traumatic subdural hematoma: s/p evacuation in 2013  Pituitary Tumor: excision in 1992  S/P Cholecystectomy: open in 1979  H/O: Hysterectomy: abdominal with right oophorectomy in 1982  S/P Tubal Ligation: in 1978.    Allergies.  aspirin (Nausea; Other)  D.A. Chewable (Other)  erythromycin (Rash)  Naprosyn (Other)  Talwin (Unknown)  Talwin Compound (Urticaria; Rash)  Talwin Lactate (Other)    Intolerances.  MEDICATIONS  (STANDING):  alteplase for catheter clearance 2 milliGRAM(s) Catheter once  amLODIPine   Tablet 5 milliGRAM(s) Oral daily  ceFAZolin   IVPB 2000 milliGRAM(s) IV Intermittent once  chlorhexidine 4% Liquid 1 Application(s) Topical <User Schedule>  chlorhexidine 4% Liquid 1 Application(s) Topical <User Schedule>  dexAMETHasone  Injectable 4 milliGRAM(s) IV Push every 6 hours  DULoxetine 60 milliGRAM(s) Oral daily  insulin lispro (HumaLOG) corrective regimen sliding scale   SubCutaneous every 6 hours  levETIRAcetam  IVPB 750 milliGRAM(s) IV Intermittent every 12 hours  levothyroxine Injectable 25 MICROGram(s) IV Push at bedtime  magnesium sulfate  IVPB 1 Gram(s) IV Intermittent once  metoprolol tartrate 25 milliGRAM(s) Oral two times a day  polyethylene glycol 3350 17 Gram(s) Oral every 12 hours  potassium chloride   Solution 40 milliEquivalent(s) Oral once  pregabalin 100 milliGRAM(s) Oral every 12 hours  senna 2 Tablet(s) Oral at bedtime  simvastatin 20 milliGRAM(s) Oral at bedtime  sodium chloride 0.9%. 1000 milliLiter(s) (75 mL/Hr) IV Continuous <Continuous>    MEDICATIONS  (PRN):  acetaminophen   Tablet .. 650 milliGRAM(s) Oral every 6 hours PRN Temp greater or equal to 38C (100.4F), Mild Pain (1 - 3)  sodium chloride 0.9% lock flush 10 milliLiter(s) IV Push every 1 hour PRN Pre/post blood products, medications, blood draw, and to maintain line patency.    Social History: SEE CONSULT.  Family history: SEE CONSULT.  ROS:   ENT: all negative except as noted in HPI     Vital Signs Last 24 Hrs  T(C): 36.8 (31 Oct 2019 23:00), Max: 36.9 (31 Oct 2019 19:00)  T(F): 98.3 (31 Oct 2019 23:00), Max: 98.5 (31 Oct 2019 19:00)  HR: 99 (01 Nov 2019 03:00) (68 - 112)  BP: 134/84 (31 Oct 2019 07:28) (134/84 - 134/84)  BP(mean): --  RR: 19 (01 Nov 2019 03:00) (16 - 25)  SpO2: 98% (01 Nov 2019 03:00) (97% - 100%)                        12.2   17.95 )-----------( 210      ( 31 Oct 2019 21:41 )             37.1    10-31    142  |  104  |  10  ----------------------------<  229<H>  3.4<L>   |  22  |  0.86    Ca    8.3<L>      31 Oct 2019 21:41  Phos  3.6     10-31  Mg     1.7     10-31     PT/INR - ( 31 Oct 2019 06:42 )   PT: 11.6 sec;   INR: 1.01 ratio      PHYSICAL EXAM:  Gen: NAD.  Skin: No rashes, bruises, or lesions.  Head:   Face: no edema, erythema, or fluctuance. Parotid glands soft without mass.  Eyes:   Nose: Nares bilaterally patent, no discharge.  Mouth: No Stridor / Drooling / Trismus.  Mucosa moist, tongue/uvula midline, oropharynx clear.  Neck: Flat, supple, no lymphadenopathy, trachea midline, no masses.  Lymphatic: No lymphadenopathy.  Resp: breathing easily, no stridor.  Neuro: ENT ISSUE/POD: S/P Left  orbital craniotomy for meningioma with left eye closure POD # 0.    HPI: 77 YO F with PMH of  HTN, T2DM, Hyperlipidemia, Sinus/pituitary brain tumor s/p excision (1982), s/p fall ->subdural hematoma s/p craniotomy  (2013), seizure disorder,  poor gait and balance, dementia, C/O Skull base meningioma since 2010, it has gradually enlarged in size over the years, s/p left orbital enucleation (2018). The skull base meningioma has experienced significant growth and now involving the cavernous sinus and wrapping around supraclinoid segments of left ICA, expending to orbit and filling it's content from apex to anterior portion of the orbit. 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     PAST MEDICAL & SURGICAL HISTORY:  History of seizure disorder  Irritable bowel syndrome with diarrhea  [place] (hard of hearing)  GERD (gastroesophageal reflux disease)  Frequent falls  Gait instability  Chronic depression  Legally blind  Sedentary lifestyle  Adrenal incidentaloma  Brain tumor: sellar mass.  hx of L eye blidness/proptosis from a ?tumor  Sciatica: both hips  Arthritis: has pain in hips and back  HLD (Hyperlipidemia)  Non-Insulin Dependent Diabetes Mellitus: diagnosed in 1992  Hypertension  S/P myringotomy with insertion of tube: 2019  History of eye enucleation: 2018 s/p Left orbital enucleation  Traumatic subdural hematoma: s/p evacuation in 2013  Pituitary Tumor: excision in 1992  S/P Cholecystectomy: open in 1979  H/O: Hysterectomy: abdominal with right oophorectomy in 1982  S/P Tubal Ligation: in 1978.    Allergies.  aspirin (Nausea; Other)  D.A. Chewable (Other)  erythromycin (Rash)  Naprosyn (Other)  Talwin (Unknown)  Talwin Compound (Urticaria; Rash)  Talwin Lactate (Other)    Intolerances.  MEDICATIONS  (STANDING):  alteplase for catheter clearance 2 milliGRAM(s) Catheter once  amLODIPine   Tablet 5 milliGRAM(s) Oral daily  ceFAZolin   IVPB 2000 milliGRAM(s) IV Intermittent once  chlorhexidine 4% Liquid 1 Application(s) Topical <User Schedule>  chlorhexidine 4% Liquid 1 Application(s) Topical <User Schedule>  dexAMETHasone  Injectable 4 milliGRAM(s) IV Push every 6 hours  DULoxetine 60 milliGRAM(s) Oral daily  insulin lispro (HumaLOG) corrective regimen sliding scale   SubCutaneous every 6 hours  levETIRAcetam  IVPB 750 milliGRAM(s) IV Intermittent every 12 hours  levothyroxine Injectable 25 MICROGram(s) IV Push at bedtime  magnesium sulfate  IVPB 1 Gram(s) IV Intermittent once  metoprolol tartrate 25 milliGRAM(s) Oral two times a day  polyethylene glycol 3350 17 Gram(s) Oral every 12 hours  potassium chloride   Solution 40 milliEquivalent(s) Oral once  pregabalin 100 milliGRAM(s) Oral every 12 hours  senna 2 Tablet(s) Oral at bedtime  simvastatin 20 milliGRAM(s) Oral at bedtime  sodium chloride 0.9%. 1000 milliLiter(s) (75 mL/Hr) IV Continuous <Continuous>    MEDICATIONS  (PRN):  acetaminophen   Tablet .. 650 milliGRAM(s) Oral every 6 hours PRN Temp greater or equal to 38C (100.4F), Mild Pain (1 - 3)  sodium chloride 0.9% lock flush 10 milliLiter(s) IV Push every 1 hour PRN Pre/post blood products, medications, blood draw, and to maintain line patency.    Social History: SEE CONSULT.  Family history: SEE CONSULT.  ROS:   ENT: all negative except as noted in HPI     Vital Signs Last 24 Hrs  T(C): 36.8 (31 Oct 2019 23:00), Max: 36.9 (31 Oct 2019 19:00)  T(F): 98.3 (31 Oct 2019 23:00), Max: 98.5 (31 Oct 2019 19:00)  HR: 99 (01 Nov 2019 03:00) (68 - 112)  BP: 134/84 (31 Oct 2019 07:28) (134/84 - 134/84)  BP(mean): --  RR: 19 (01 Nov 2019 03:00) (16 - 25)  SpO2: 98% (01 Nov 2019 03:00) (97% - 100%)                        12.2   17.95 )-----------( 210      ( 31 Oct 2019 21:41 )             37.1    10-31    142  |  104  |  10  ----------------------------<  229<H>  3.4<L>   |  22  |  0.86    Ca    8.3<L>      31 Oct 2019 21:41  Phos  3.6     10-31  Mg     1.7     10-31     PT/INR - ( 31 Oct 2019 06:42 )   PT: 11.6 sec;   INR: 1.01 ratio      PHYSICAL EXAM:  Gen: NAD.  Skin: No rashes, bruises, or lesions.  Head: NC/AT.  Face: no edema, erythema, or fluctuance. Parotid glands soft without mass.  Eyes: Left eye closure with sutures.   Nose: Nares bilaterally patent, no discharge.  Mouth: No Stridor / Drooling / Trismus.  Mucosa moist, tongue/uvula midline, oropharynx clear.  Neck: Flat, supple, no lymphadenopathy, trachea midline, no masses.  Lymphatic: No lymphadenopathy.  Resp: breathing easily, no stridor.  Neuro: unable to obtain. ENT ISSUE/POD: S/P Left  orbital craniotomy for meningioma with left eye closure POD # 0.    HPI: 79 YO F with PMH of  HTN, T2DM, Hyperlipidemia, Sinus/pituitary brain tumor s/p excision (1982), s/p fall ->subdural hematoma s/p craniotomy  (2013), seizure disorder,  poor gait and balance, dementia, C/O Skull base meningioma since 2010, it has gradually enlarged in size over the years, s/p left orbital enucleation (2018). The skull base meningioma has experienced significant growth and now involving the cavernous sinus and wrapping around supraclinoid segments of left ICA, expending to orbit and filling it's content from apex to anterior portion of the orbit. 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     PAST MEDICAL & SURGICAL HISTORY:  History of seizure disorder  Irritable bowel syndrome with diarrhea  [place] (hard of hearing)  GERD (gastroesophageal reflux disease)  Frequent falls  Gait instability  Chronic depression  Legally blind  Sedentary lifestyle  Adrenal incidentaloma  Brain tumor: sellar mass.  hx of L eye blidness/proptosis from a ?tumor  Sciatica: both hips  Arthritis: has pain in hips and back  HLD (Hyperlipidemia)  Non-Insulin Dependent Diabetes Mellitus: diagnosed in 1992  Hypertension  S/P myringotomy with insertion of tube: 2019  History of eye enucleation: 2018 s/p Left orbital enucleation  Traumatic subdural hematoma: s/p evacuation in 2013  Pituitary Tumor: excision in 1992  S/P Cholecystectomy: open in 1979  H/O: Hysterectomy: abdominal with right oophorectomy in 1982  S/P Tubal Ligation: in 1978.    Allergies.  aspirin (Nausea; Other)  D.A. Chewable (Other)  erythromycin (Rash)  Naprosyn (Other)  Talwin (Unknown)  Talwin Compound (Urticaria; Rash)  Talwin Lactate (Other)    Intolerances.  MEDICATIONS  (STANDING):  alteplase for catheter clearance 2 milliGRAM(s) Catheter once  amLODIPine   Tablet 5 milliGRAM(s) Oral daily  ceFAZolin   IVPB 2000 milliGRAM(s) IV Intermittent once  chlorhexidine 4% Liquid 1 Application(s) Topical <User Schedule>  chlorhexidine 4% Liquid 1 Application(s) Topical <User Schedule>  dexAMETHasone  Injectable 4 milliGRAM(s) IV Push every 6 hours  DULoxetine 60 milliGRAM(s) Oral daily  insulin lispro (HumaLOG) corrective regimen sliding scale   SubCutaneous every 6 hours  levETIRAcetam  IVPB 750 milliGRAM(s) IV Intermittent every 12 hours  levothyroxine Injectable 25 MICROGram(s) IV Push at bedtime  magnesium sulfate  IVPB 1 Gram(s) IV Intermittent once  metoprolol tartrate 25 milliGRAM(s) Oral two times a day  polyethylene glycol 3350 17 Gram(s) Oral every 12 hours  potassium chloride   Solution 40 milliEquivalent(s) Oral once  pregabalin 100 milliGRAM(s) Oral every 12 hours  senna 2 Tablet(s) Oral at bedtime  simvastatin 20 milliGRAM(s) Oral at bedtime  sodium chloride 0.9%. 1000 milliLiter(s) (75 mL/Hr) IV Continuous <Continuous>    MEDICATIONS  (PRN):  acetaminophen   Tablet .. 650 milliGRAM(s) Oral every 6 hours PRN Temp greater or equal to 38C (100.4F), Mild Pain (1 - 3)  sodium chloride 0.9% lock flush 10 milliLiter(s) IV Push every 1 hour PRN Pre/post blood products, medications, blood draw, and to maintain line patency.    Social History: SEE CONSULT.  Family history: SEE CONSULT.  ROS:   ENT: all negative except as noted in HPI     Vital Signs Last 24 Hrs  T(C): 36.8 (31 Oct 2019 23:00), Max: 36.9 (31 Oct 2019 19:00)  T(F): 98.3 (31 Oct 2019 23:00), Max: 98.5 (31 Oct 2019 19:00)  HR: 99 (01 Nov 2019 03:00) (68 - 112)  BP: 134/84 (31 Oct 2019 07:28) (134/84 - 134/84)  BP(mean): --  RR: 19 (01 Nov 2019 03:00) (16 - 25)  SpO2: 98% (01 Nov 2019 03:00) (97% - 100%)                        12.2   17.95 )-----------( 210      ( 31 Oct 2019 21:41 )             37.1    10-31    142  |  104  |  10  ----------------------------<  229<H>  3.4<L>   |  22  |  0.86    Ca    8.3<L>      31 Oct 2019 21:41  Phos  3.6     10-31  Mg     1.7     10-31     PT/INR - ( 31 Oct 2019 06:42 )   PT: 11.6 sec;   INR: 1.01 ratio      PHYSICAL EXAM:  Gen: NAD.  Skin: No rashes, bruises, or lesions.  Head: NC/AT.  Face: no edema, erythema, or fluctuance. Parotid glands soft without mass.  Eyes: Left eye closure with sutures in place.   Nose: Nares bilaterally patent, no discharge.  Mouth: No Stridor / Drooling / Trismus.  Mucosa moist, tongue/uvula midline, oropharynx clear.  Neck: Flat, supple, no lymphadenopathy, trachea midline, no masses.  Lymphatic: No lymphadenopathy.  Resp: breathing easily, no stridor.  Neuro: unable to obtain. ENT ISSUE/POD: S/P Left  orbital craniotomy for meningioma with left eye closure POD # 0.    HPI: 77 YO F with PMH of  HTN, T2DM, Hyperlipidemia, Sinus/pituitary brain tumor s/p excision (1982), s/p fall ->subdural hematoma s/p craniotomy  (2013), seizure disorder,  poor gait and balance, dementia, C/O Skull base meningioma since 2010, it has gradually enlarged in size over the years, s/p left orbital enucleation (2018). The skull base meningioma has experienced significant growth and now involving the cavernous sinus and wrapping around supraclinoid segments of left ICA, expending to orbit and filling it's content from apex to anterior portion of the orbit. 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     PAST MEDICAL & SURGICAL HISTORY:  History of seizure disorder  Irritable bowel syndrome with diarrhea  [place] (hard of hearing)  GERD (gastroesophageal reflux disease)  Frequent falls  Gait instability  Chronic depression  Legally blind  Sedentary lifestyle  Adrenal incidentaloma  Brain tumor: sellar mass.  hx of L eye blidness/proptosis from a ?tumor  Sciatica: both hips  Arthritis: has pain in hips and back  HLD (Hyperlipidemia)  Non-Insulin Dependent Diabetes Mellitus: diagnosed in 1992  Hypertension  S/P myringotomy with insertion of tube: 2019  History of eye enucleation: 2018 s/p Left orbital enucleation  Traumatic subdural hematoma: s/p evacuation in 2013  Pituitary Tumor: excision in 1992  S/P Cholecystectomy: open in 1979  H/O: Hysterectomy: abdominal with right oophorectomy in 1982  S/P Tubal Ligation: in 1978.    Allergies.  aspirin (Nausea; Other)  D.A. Chewable (Other)  erythromycin (Rash)  Naprosyn (Other)  Talwin (Unknown)  Talwin Compound (Urticaria; Rash)  Talwin Lactate (Other)    Intolerances.  MEDICATIONS  (STANDING):  alteplase for catheter clearance 2 milliGRAM(s) Catheter once  amLODIPine   Tablet 5 milliGRAM(s) Oral daily  ceFAZolin   IVPB 2000 milliGRAM(s) IV Intermittent once  chlorhexidine 4% Liquid 1 Application(s) Topical <User Schedule>  chlorhexidine 4% Liquid 1 Application(s) Topical <User Schedule>  dexAMETHasone  Injectable 4 milliGRAM(s) IV Push every 6 hours  DULoxetine 60 milliGRAM(s) Oral daily  insulin lispro (HumaLOG) corrective regimen sliding scale   SubCutaneous every 6 hours  levETIRAcetam  IVPB 750 milliGRAM(s) IV Intermittent every 12 hours  levothyroxine Injectable 25 MICROGram(s) IV Push at bedtime  magnesium sulfate  IVPB 1 Gram(s) IV Intermittent once  metoprolol tartrate 25 milliGRAM(s) Oral two times a day  polyethylene glycol 3350 17 Gram(s) Oral every 12 hours  potassium chloride   Solution 40 milliEquivalent(s) Oral once  pregabalin 100 milliGRAM(s) Oral every 12 hours  senna 2 Tablet(s) Oral at bedtime  simvastatin 20 milliGRAM(s) Oral at bedtime  sodium chloride 0.9%. 1000 milliLiter(s) (75 mL/Hr) IV Continuous <Continuous>    MEDICATIONS  (PRN):  acetaminophen   Tablet .. 650 milliGRAM(s) Oral every 6 hours PRN Temp greater or equal to 38C (100.4F), Mild Pain (1 - 3)  sodium chloride 0.9% lock flush 10 milliLiter(s) IV Push every 1 hour PRN Pre/post blood products, medications, blood draw, and to maintain line patency.    Social History: SEE CONSULT.  Family history: SEE CONSULT.  ROS:   ENT: all negative except as noted in HPI     Vital Signs Last 24 Hrs  T(C): 36.8 (31 Oct 2019 23:00), Max: 36.9 (31 Oct 2019 19:00)  T(F): 98.3 (31 Oct 2019 23:00), Max: 98.5 (31 Oct 2019 19:00)  HR: 99 (01 Nov 2019 03:00) (68 - 112)  BP: 134/84 (31 Oct 2019 07:28) (134/84 - 134/84)  BP(mean): --  RR: 19 (01 Nov 2019 03:00) (16 - 25)  SpO2: 98% (01 Nov 2019 03:00) (97% - 100%)                        12.2   17.95 )-----------( 210      ( 31 Oct 2019 21:41 )             37.1    10-31    142  |  104  |  10  ----------------------------<  229<H>  3.4<L>   |  22  |  0.86    Ca    8.3<L>      31 Oct 2019 21:41  Phos  3.6     10-31  Mg     1.7     10-31     PT/INR - ( 31 Oct 2019 06:42 )   PT: 11.6 sec;   INR: 1.01 ratio      PHYSICAL EXAM:  Gen: NAD.  Skin: No rashes, bruises, or lesions.  Head: NC/AT.  Face: no edema, erythema, or fluctuance. Parotid glands soft without mass.  Eyes: Left eye closure with sutures in place, site c/d/i. SUSAN x1 in place, draining serosanguinous output.   Nose: Nares bilaterally patent, no discharge.  Mouth: No Stridor / Drooling / Trismus.  Mucosa moist, tongue/uvula midline, oropharynx clear.  Neck: Flat, supple, no lymphadenopathy, trachea midline, no masses.  Lymphatic: No lymphadenopathy.  Resp: breathing easily, no stridor.  Neuro: unable to obtain.

## 2019-10-31 NOTE — PROGRESS NOTE ADULT - PROBLEM SELECTOR PLAN 1
-  S/P  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 with Left eye closure POD #0.   - Monitor SUSAN output.   - Pain meds prn.  - Neuro checks.   - Repeat CTH in AM.  - ENT will follow.   - Call with questions.     ARACELI YOUNG PA-C.   # [id number].  ENT PA.

## 2019-10-31 NOTE — PRE-ANESTHESIA EVALUATION ADULT - NSPROPOSEDPROCEDFT_GEN_ALL_CORE
L orbital craniotomy approch for skull base tumor, L orbital tumor resection, orbita and zygomatic osteotomies

## 2019-10-31 NOTE — PRE-ANESTHESIA EVALUATION ADULT - NSANTHADDINFOFT_GEN_ALL_CORE
GA, A-line, central line, possible prolonged intubation, Neuro ICU post op, pt and family aware of high risk of procedure and wish to proceed

## 2019-10-31 NOTE — PROGRESS NOTE ADULT - SUBJECTIVE AND OBJECTIVE BOX
77 y/o [place] female presents to [facility] accompanied by daughter with H/O HTN, T2DM, Hyperlipidemia, Sinus/pituitary brain tumor s/p excision (1982), s/p fall ->subdural hematoma s/p craniotomy  (2013), seizure disorder,  poor gait and balance, dementia, C/O Skull base meningioma since 2010, it has gradually enlarged in size over the years, s/p left orbital enucleation (2018). The skull base meningioma has experienced significant growth and now involving the cavernous sinus and wrapping around supraclinoid segments of left ICA, expending to orbit and filling it's content from apex to anterior portion of the orbit.     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     On admission, the patient was:  GCS:  Goldman-Szymanski:  modified Tijerina:  ICH score:  NIHSS:    *** HIGH RISK OF DVT PRESENT ON ADMISSION ***    VITALS:  Recent Vitals Reviewed   LABS:  Recent Labs Reviewed  MEDICATIONS: All Recent Medicaitons Reviewed   IMAGING:   Recent imaging studies were reviewed.    EXAMINATION:  General:  calm  HEENT:  left eye sowed shut,   Neuro:  moans, doesn't speak, not FC, HANSEN in the plane of the bed  Cards:  s1, s2 RRR  Respiratory:  lungs clear b/l   Adomen:  soft  Extremities:  no edema  Skin:  warm/dry 77 y/o [place] female presents to [facility] accompanied by daughter with H/O HTN, T2DM, Hyperlipidemia, Sinus/pituitary brain tumor s/p excision (1982), s/p fall ->subdural hematoma s/p craniotomy  (2013), seizure disorder,  poor gait and balance, dementia, C/O Skull base meningioma since 2010, it has gradually enlarged in size over the years, s/p left orbital enucleation (2018). The skull base meningioma has experienced significant growth and now involving the cavernous sinus and wrapping around supraclinoid segments of left ICA, expending to orbit and filling it's content from apex to anterior portion of the orbit.     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     On admission, the patient was:  GCS:  Goldman-Szymanski:  modified Tijerina:  ICH score:  NIHSS:    *** HIGH RISK OF DVT PRESENT ON ADMISSION ***    VITALS:  Recent Vitals Reviewed   LABS:  Recent Labs Reviewed  MEDICATIONS: All Recent Medicaitons Reviewed   IMAGING:   Recent imaging studies were reviewed.    EXAMINATION:  General:  calm  HEENT:  left eye sowed shut,   Neuro:  moans, doesn't speak but mumbles name, not FC, HANSEN in the plane of the bed  Cards:  s1, s2 RRR  Respiratory:  lungs clear b/l   Adomen:  soft  Extremities:  no edema  Skin:  warm/dry 77 y/o [place] female presents to [facility] accompanied by daughter with H/O HTN, T2DM, Hyperlipidemia, Sinus/pituitary brain tumor s/p excision (1982), s/p fall ->subdural hematoma s/p craniotomy  (2013), seizure disorder,  poor gait and balance, dementia, C/O Skull base meningioma since 2010, it has gradually enlarged in size over the years, s/p left orbital enucleation (2018). The skull base meningioma has experienced significant growth and now involving the cavernous sinus and wrapping around supraclinoid segments of left ICA, expending to orbit and filling it's content from apex to anterior portion of the orbit.     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     VITALS:  Recent Vitals Reviewed   LABS:  Recent Labs Reviewed  MEDICATIONS: All Recent Medicaitons Reviewed   IMAGING:   Recent imaging studies were reviewed.    EXAMINATION:  General:  calm  HEENT:  left eye sowed shut,   Neuro:  moans, doesn't speak but mumbles name, not FC, HANSEN in the plane of the bed  Cards:  s1, s2 RRR  Respiratory:  lungs clear b/l   Adomen:  soft  Extremities:  no edema  Skin:  warm/dry

## 2019-10-31 NOTE — CHART NOTE - NSCHARTNOTEFT_GEN_A_CORE
CAPRINI SCORE [CLOT] Score on Admission for     AGE RELATED RISK FACTORS                                                       MOBILITY RELATED FACTORS  [ ] Age 41-60 years                                            (1 Point)                  [ ] Bed rest                                                        (1 Point)  [ ] Age: 61-74 years                                           (2 Points)                 [ ] Plaster cast                                                   (2 Points)  [X ] Age= 75 years                                              (3 Points)                 [ ] Bed bound for more than 72 hours                 (2 Points)    DISEASE RELATED RISK FACTORS                                               GENDER SPECIFIC FACTORS  [ ] Edema in the lower extremities                       (1 Point)                  [ ] Pregnancy                                                     (1 Point)  [ ] Varicose veins                                               (1 Point)                  [ ] Post-partum < 6 weeks                                   (1 Point)             [X ] BMI > 25 Kg/m2                                            (1 Point)                  [ ] Hormonal therapy  or oral contraception          (1 Point)                 [ ] Sepsis (in the previous month)                        (1 Point)                  [ ] History of pregnancy complications                 (1 point)  [ ] Pneumonia or serious lung disease                                               [ ] Unexplained or recurrent                     (1 Point)           (in the previous month)                               (1 Point)  [ ] Abnormal pulmonary function test                     (1 Point)                 SURGERY RELATED RISK FACTORS (include planned surgeries)  [ ] Acute myocardial infarction                              (1 Point)                 [ ]  Section                                             (1 Point)  [ ] Congestive heart failure (in the previous month)  (1 Point)         [ ] Minor surgery                                                  (1 Point)   [ ] Inflammatory bowel disease                             (1 Point)                 [ ] Arthroscopic surgery                                        (2 Points)  [ ] Central venous access                                      (2 Points)                [X ] General surgery lasting more than 45 minutes   (2 Points)       [ ] Stroke (in the previous month)                          (5 Points)               [ ] Elective arthroplasty                                         (5 Points)            [ X] current or past malignancy                              (2 Points)                                                                                                       HEMATOLOGY RELATED FACTORS                                                 TRAUMA RELATED RISK FACTORS  [ ] Prior episodes of VTE                                     (3 Points)                [ ] Fracture of the hip, pelvis, or leg                       (5 Points)  [ ] Positive family history for VTE                         (3 Points)                 [ ] Acute spinal cord injury (in the previous month)  (5 Points)  [ ] Prothrombin 24205 A                                     (3 Points)                 [ ] Paralysis  (less than 1 month)                             (5 Points)  [ ] Factor V Leiden                                             (3 Points)                  [ ] Multiple Trauma within 1 month                        (5 Points)  [ ] Lupus anticoagulants                                     (3 Points)                                                           [ ] Anticardiolipin antibodies                               (3 Points)                                                       [ ] High homocysteine in the blood                      (3 Points)                                             [ ] Other congenital or acquired thrombophilia      (3 Points)                                                [ ] Heparin induced thrombocytopenia                  (3 Points)                                          Total Score [     8     ]    Risk:  Very low 0   Low 1 to 2   Moderate 3 to 4   High =5       VTE Prophylaxis Recommednations:  [ X] mechanical pneumatic compression devices                                      [ ] contraindicated: _____________________  [ ] chemo prophylaxis                                                                             [ X] contraindicated ___Post op__________________    **** HIGH LIKELIHOOD DVT PRESENT ON ADMISSION  [ X] (please order LE dopplers within 24 hours of admission)

## 2019-11-01 NOTE — PROGRESS NOTE ADULT - ASSESSMENT
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

## 2019-11-01 NOTE — PROGRESS NOTE ADULT - SUBJECTIVE AND OBJECTIVE BOX
Patient seen and examined at bedside.    --Anticoagulation--  alteplase for catheter clearance 2 milliGRAM(s) Catheter once    T(C): 36.8 (10-31-19 @ 23:00), Max: 36.9 (10-31-19 @ 19:00)  HR: 102 (11-01-19 @ 02:00) (68 - 112)  BP: 134/84 (10-31-19 @ 07:28) (134/84 - 134/84)  RR: 25 (11-01-19 @ 02:00) (16 - 25)  SpO2: 97% (11-01-19 @ 02:00) (97% - 100%)  Wt(kg): --    Exam:  Left eyes sutured closed, moaning, mumbles name, not FC, HANSEN equally and purposefully

## 2019-11-01 NOTE — PROGRESS NOTE ADULT - SUBJECTIVE AND OBJECTIVE BOX
ENT ISSUE/POD:  S/P Left  orbital craniotomy for meningioma with left eye closure POD # 1.    HPI: 77 YO F with PMH of  HTN, T2DM, Hyperlipidemia, Sinus/pituitary brain tumor s/p excision (1982), s/p fall ->subdural hematoma s/p craniotomy  (2013), seizure disorder,  poor gait and balance, dementia, C/O Skull base meningioma since 2010, it has gradually enlarged in size over the years, s/p left orbital enucleation (2018). The skull base meningioma has experienced significant growth and now involving the cavernous sinus and wrapping around supraclinoid segments of left ICA, expending to orbit and filling it's content from apex to anterior portion of the orbit. 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 No acute events overnight.          PAST MEDICAL & SURGICAL HISTORY:  History of seizure disorder  Irritable bowel syndrome with diarrhea  [place] (hard of hearing)  GERD (gastroesophageal reflux disease)  Frequent falls  Gait instability  Chronic depression  Legally blind  Sedentary lifestyle  Adrenal incidentaloma  Brain tumor: sellar mass.  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 Chewable (Other)  erythromycin (Rash)  Naprosyn (Other)  Talwin (Unknown)  Talwin Compound (Urticaria; Rash)  Talwin Lactate (Other)    Intolerances      MEDICATIONS  (STANDING):  alteplase for catheter clearance 2 milliGRAM(s) Catheter once  amLODIPine   Tablet 5 milliGRAM(s) Oral daily  ceFAZolin   IVPB 2000 milliGRAM(s) IV Intermittent once  chlorhexidine 4% Liquid 1 Application(s) Topical <User Schedule>  chlorhexidine 4% Liquid 1 Application(s) Topical <User Schedule>  dexAMETHasone  Injectable 4 milliGRAM(s) IV Push every 6 hours  DULoxetine 60 milliGRAM(s) Oral daily  insulin lispro (HumaLOG) corrective regimen sliding scale   SubCutaneous every 6 hours  levETIRAcetam  IVPB 750 milliGRAM(s) IV Intermittent every 12 hours  levothyroxine Injectable 25 MICROGram(s) IV Push at bedtime  metoprolol tartrate 25 milliGRAM(s) Oral two times a day  polyethylene glycol 3350 17 Gram(s) Oral every 12 hours  pregabalin 100 milliGRAM(s) Oral every 12 hours  senna 2 Tablet(s) Oral at bedtime  simvastatin 20 milliGRAM(s) Oral at bedtime  sodium chloride 0.9%. 1000 milliLiter(s) (75 mL/Hr) IV Continuous <Continuous>    MEDICATIONS  (PRN):  acetaminophen   Tablet .. 650 milliGRAM(s) Oral every 6 hours PRN Temp greater or equal to 38C (100.4F), Mild Pain (1 - 3)  sodium chloride 0.9% lock flush 10 milliLiter(s) IV Push every 1 hour PRN Pre/post blood products, medications, blood draw, and to maintain line patency      Social History: see consult    Family history: see consult    ROS:   ENT: all negative except as noted in HPI   Pulm: denies SOB, cough, hemoptysis  Neuro: denies numbness/tingling, loss of sensation  Endo: denies heat/cold intolerance, excessive sweating      Vital Signs Last 24 Hrs  T(C): 37.2 (01 Nov 2019 07:00), Max: 37.3 (01 Nov 2019 03:00)  T(F): 98.9 (01 Nov 2019 07:00), Max: 99.1 (01 Nov 2019 03:00)  HR: 100 (01 Nov 2019 07:00) (93 - 112)  BP: --  BP(mean): --  RR: 23 (01 Nov 2019 07:00) (16 - 25)  SpO2: 91% (01 Nov 2019 07:00) (91% - 100%)                          12.2   17.95 )-----------( 210      ( 31 Oct 2019 21:41 )             37.1    10-31    142  |  104  |  10  ----------------------------<  229<H>  3.4<L>   |  22  |  0.86    Ca    8.3<L>      31 Oct 2019 21:41  Phos  3.6     10-31  Mg     1.7     10-31     PT/INR - ( 31 Oct 2019 06:42 )   PT: 11.6 sec;   INR: 1.01 ratio       SUSAN serosanguinous,       PHYSICAL EXAM:  Gen: NAD.  Skin: No rashes, bruises, or lesions.  Head: NC/AT.  Face: no edema, erythema, or fluctuance. Parotid glands soft without mass.  Eyes: Left eye closure with sutures in place, site c/d/i. SUSAN x1 in place, draining serosanguinous output.   Nose: Nares bilaterally patent, no discharge.  Mouth: No Stridor / Drooling / Trismus.  Mucosa moist, tongue/uvula midline, oropharynx clear.  Neck: Flat, supple, no lymphadenopathy, trachea midline, no masses.  Lymphatic: No lymphadenopathy.  Resp: breathing easily, no stridor.  Neuro: unable to obtain. ENT ISSUE/POD:  S/P Left  orbital craniotomy for meningioma with left eye closure POD # 1.    HPI: 79 YO F with PMH of  HTN, T2DM, Hyperlipidemia, Sinus/pituitary brain tumor s/p excision (1982), s/p fall ->subdural hematoma s/p craniotomy  (2013), seizure disorder,  poor gait and balance, dementia, C/O Skull base meningioma since 2010, it has gradually enlarged in size over the years, s/p left orbital enucleation (2018). The skull base meningioma has experienced significant growth and now involving the cavernous sinus and wrapping around supraclinoid segments of left ICA, expending to orbit and filling it's content from apex to anterior portion of the orbit. 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 No acute events overnight.          PAST MEDICAL & SURGICAL HISTORY:  History of seizure disorder  Irritable bowel syndrome with diarrhea  [place] (hard of hearing)  GERD (gastroesophageal reflux disease)  Frequent falls  Gait instability  Chronic depression  Legally blind  Sedentary lifestyle  Adrenal incidentaloma  Brain tumor: sellar mass.  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 Chewable (Other)  erythromycin (Rash)  Naprosyn (Other)  Talwin (Unknown)  Talwin Compound (Urticaria; Rash)  Talwin Lactate (Other)    Intolerances      MEDICATIONS  (STANDING):  alteplase for catheter clearance 2 milliGRAM(s) Catheter once  amLODIPine   Tablet 5 milliGRAM(s) Oral daily  ceFAZolin   IVPB 2000 milliGRAM(s) IV Intermittent once  chlorhexidine 4% Liquid 1 Application(s) Topical <User Schedule>  chlorhexidine 4% Liquid 1 Application(s) Topical <User Schedule>  dexAMETHasone  Injectable 4 milliGRAM(s) IV Push every 6 hours  DULoxetine 60 milliGRAM(s) Oral daily  insulin lispro (HumaLOG) corrective regimen sliding scale   SubCutaneous every 6 hours  levETIRAcetam  IVPB 750 milliGRAM(s) IV Intermittent every 12 hours  levothyroxine Injectable 25 MICROGram(s) IV Push at bedtime  metoprolol tartrate 25 milliGRAM(s) Oral two times a day  polyethylene glycol 3350 17 Gram(s) Oral every 12 hours  pregabalin 100 milliGRAM(s) Oral every 12 hours  senna 2 Tablet(s) Oral at bedtime  simvastatin 20 milliGRAM(s) Oral at bedtime  sodium chloride 0.9%. 1000 milliLiter(s) (75 mL/Hr) IV Continuous <Continuous>    MEDICATIONS  (PRN):  acetaminophen   Tablet .. 650 milliGRAM(s) Oral every 6 hours PRN Temp greater or equal to 38C (100.4F), Mild Pain (1 - 3)  sodium chloride 0.9% lock flush 10 milliLiter(s) IV Push every 1 hour PRN Pre/post blood products, medications, blood draw, and to maintain line patency      Social History: see consult    Family history: see consult    ROS:   ENT: all negative except as noted in HPI   Pulm: denies SOB, cough, hemoptysis  Neuro: denies numbness/tingling, loss of sensation  Endo: denies heat/cold intolerance, excessive sweating      Vital Signs Last 24 Hrs  T(C): 37.2 (01 Nov 2019 07:00), Max: 37.3 (01 Nov 2019 03:00)  T(F): 98.9 (01 Nov 2019 07:00), Max: 99.1 (01 Nov 2019 03:00)  HR: 100 (01 Nov 2019 07:00) (93 - 112)  BP: --  BP(mean): --  RR: 23 (01 Nov 2019 07:00) (16 - 25)  SpO2: 91% (01 Nov 2019 07:00) (91% - 100%)                          12.2   17.95 )-----------( 210      ( 31 Oct 2019 21:41 )             37.1    10-31    142  |  104  |  10  ----------------------------<  229<H>  3.4<L>   |  22  |  0.86    Ca    8.3<L>      31 Oct 2019 21:41  Phos  3.6     10-31  Mg     1.7     10-31     PT/INR - ( 31 Oct 2019 06:42 )   PT: 11.6 sec;   INR: 1.01 ratio       SUSAN serosanguinous, 30 cc overnight      PHYSICAL EXAM:  Gen: NAD.  Skin: No rashes, bruises, or lesions.  Head: NC/AT.  Face: no edema, erythema, or fluctuance. Parotid glands soft without mass.  Eyes: Left eye closure with sutures in place, site c/d/i. SUSAN x1 in place, draining serosanguinous output.   Nose: Nares bilaterally patent, no discharge.  Mouth: No Stridor / Drooling / Trismus.  Mucosa moist, tongue/uvula midline, oropharynx clear.  Neck: Flat, supple, no lymphadenopathy, trachea midline, no masses.  Lymphatic: No lymphadenopathy.  Resp: breathing easily, no stridor.  Neuro: unable to obtain.

## 2019-11-01 NOTE — PROGRESS NOTE ADULT - SUBJECTIVE AND OBJECTIVE BOX
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   afebrile, started on lovenox     VITALS:  Recent Vitals Reviewed   LABS:  Recent Labs Reviewed  MEDICATIONS: All Recent Medications Reviewed   IMAGING:   Recent imaging studies were reviewed.    EXAMINATION:  General:  calm  HEENT:  left eye sowed shut,   Neuro:  moans, doesn't speak but mumbles name, not FC, HANSEN in the plane of the bed

## 2019-11-01 NOTE — PROGRESS NOTE ADULT - ASSESSMENT
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 on 10/31/19       NEURO:    Neurochecks q1 hrs,   CT Head this AM  Decadron Taper   keppra for seziure ppx   Activity: [X] mobilize as tolerated [] Bedrest [] PT [] OT [] PMNR    PULM:  O2 sat > 92%    CV:  SBP goal 100 - 160  HTN: c/w beta blockers, amlodipine   HLD - c/w statin     RENAL:  Fluids:  NS @ 75    GI:    Diet: dysphagia when more awake   GI prophylaxis [] not indicated [] PPI [] other:  Bowel regimen [x] colace [x] senna [] other:    ENDO:   Goal euglycemia (-180)    HEME/ONC:  VTE prophylaxis: [X] SCDs [X] chemoprophylaxis - if CT this AM stable    ID:  afebrile     SOCIAL/FAMILY:  [x] awaiting [] updated at bedside [] family meeting    CODE STATUS:  [x] Full Code [] DNR [] DNI [] Palliative/Comfort Care    DISPOSITION:  [x] ICU [] Stroke Unit [] Floor [] EMU [] RCU [] PCU    [x] Patient is at high risk of neurologic deterioration/death due to:  seziures, hemorrhage, herniation       Time spent: 45 critical care minutes    Contact: [phone] 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 on 10/31/19       NEURO:    Neurochecks q1 hrs,   CT Head this AM  Decadron Taper   keppra for seziure ppx   Activity: [X] mobilize as tolerated [] Bedrest [] PT [] OT [] PMNR    PULM:  O2 sat > 92%    CV:  SBP goal 100 - 160  HTN: c/w beta blockers, amlodipine   HLD - c/w statin     RENAL:  Fluids:  NS @ 75    GI:    Diet: dysphagia when more awake   GI prophylaxis [] not indicated [] PPI [] other:  Bowel regimen [x] colace [x] senna [] other:    ENDO:   Goal euglycemia (-180)  SSI / 18 hrs -10u    HEME/ONC:  VTE prophylaxis: [X] SCDs [X] chemoprophylaxis - if CT this AM stable    ID:  afebrile     SOCIAL/FAMILY:  [x] awaiting [] updated at bedside [] family meeting    CODE STATUS:  [x] Full Code [] DNR [] DNI [] Palliative/Comfort Care    DISPOSITION:  [x] ICU [] Stroke Unit [] Floor [] EMU [] RCU [] PCU    [x] Patient is at high risk of neurologic deterioration/death due to:  seziures, hemorrhage, herniation       Time spent: 45 critical care minutes    Contact: [phone] 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 on 10/31/19       NEURO:    Neurochecks q4 hrs,   CT Head - pneumocephalus  Decadron 4 q6H  keppra 750 q12H (home med)  Eye SUSAN - ENT  Also, contd on home med of Duloxetine 60 daily, Pregabalin 100 mg BID  Activity: [X] mobilize as tolerated [] Bedrest [] PT [] OT [] PMNR    PULM:  O2 sat > 92%  Incentive spirometry, if able    CV:  SBP goal 100 - 160  HTN: c/w beta blockers, amlodipine   HLD - c/w statin     RENAL:  Fluids:  IVL once TF at goal    GI:    Diet: Start TF  GI prophylaxis [] not indicated [X] PPI (home med)  Bowel regimen [x] miralax [x] senna [] other:    ENDO:   Goal euglycemia (-180)  Home dose Lantus 13 qHS  SSI / 18 hrs -10u  Will start patient on NPH 8q8, also on SSI    HEME/ONC:  VTE prophylaxis: [X] SCDs [X] start Lovenox 40 sc qHS    ID:  afebrile     SOCIAL/FAMILY:  [x] awaiting [] updated at bedside [] family meeting    CODE STATUS:  [x] Full Code [] DNR [] DNI [] Palliative/Comfort Care    DISPOSITION: Floor    [x] Patient is at high risk of neurologic deterioration/death due to:  seizures, hemorrhage, herniation       Time spent: 45 critical care minutes    Contact: [phone] 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 on 10/31/19       NEURO:    Neurochecks q4 hrs,   CT Head - pneumocephalus  Decadron 4 q6H  keppra 750 q12H (home med)  Eye SUSAN - ENT- monitor Output - 30 cc since OR  Also, contd on home med of Duloxetine 60 daily, Pregabalin 100 mg BID  Activity: [X] mobilize as tolerated [] Bedrest [X] PT [X] OT     PULM:  O2 sat > 92%  Incentive spirometry, if able    CV:  SBP goal 100 - 160  HTN: c/w beta blockers, amlodipine   HLD - c/w statin     RENAL: Urinary retention  Cardura 2mg q day - hold if SBP < 100   Fluids:  IVL once TF at goal    GI:    Diet: Start TF  GI prophylaxis [] not indicated [X] PPI (home med)  Bowel regimen [x] miralax [x] senna [] other:    ENDO:   Goal euglycemia (-180)  Home dose Lantus 13 qHS  SSI / 18 hrs -10u  Will start patient on NPH 8q8, also on SSI    HEME/ONC:  VTE prophylaxis: [X] SCDs [X] start Lovenox 40 sc qHS    ID:  afebrile     SOCIAL/FAMILY:  [x] awaiting [] updated at bedside [] family meeting    CODE STATUS:  [x] Full Code [] DNR [] DNI [] Palliative/Comfort Care    DISPOSITION: Floor    [x] Patient is at high risk of neurologic deterioration/death due to:  seizures, hemorrhage, herniation       Time spent: 45 critical care minutes    Contact: [phone] 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 on 10/31/19       NEURO:    Neurochecks q4 hrs,   CT Head - pneumocephalus  Decadron 4 q6H  keppra 750 q12H (home med)  Eye SUSAN - ENT- monitor Output - 30 cc since OR  Also, contd on home med of Duloxetine 60 daily, Pregabalin 100 mg BID  Activity: [X] mobilize as tolerated [] Bedrest [X] PT [X] OT     PULM:  O2 sat > 92%  Incentive spirometry, if able    CV:  SBP goal 100 - 160  HTN: c/w beta blockers, amlodipine   HLD - c/w statin     RENAL: Urinary retention  Cardura 2mg q day - hold if SBP < 100   Fluids:  IVL once TF at goal    GI:    Diet: Start TF  GI prophylaxis [] not indicated [X] PPI (home med)  Bowel regimen [x] miralax [x] senna [] other:    ENDO:   Goal euglycemia (-180)  Home dose Lantus 13 qHS  SSI / 18 hrs -10u  Will start patient on NPH 8q8, also on SSI    HEME/ONC:  VTE prophylaxis: [X] SCDs [X] start Lovenox 40 sc qHS    ID:  afebrile     SOCIAL/FAMILY:  [x] awaiting [] updated at bedside [] family meeting    CODE STATUS:  [x] Full Code [] DNR [] DNI [] Palliative/Comfort Care    DISPOSITION: Floor    [x] Patient is not critically ill yet medically complex     Critical Care Attestation:  Attending with resident/fellow:  I have personally provided 35 minutes of  time concurrently with the resident/fellow. This time excludes time spent on separate procedures and time spent teaching. I have reviewed the resident/fellow’s documentation and I agree with the assessment and plan of care.    Time spent: 35  minutes    Contact: [phone]

## 2019-11-01 NOTE — PROGRESS NOTE ADULT - SUBJECTIVE AND OBJECTIVE BOX
79 y/o [place] female presents to PST accompanied by daughter with H/O HTN, T2DM, Hyperlipidemia, Sinus/pituitary brain tumor s/p excision (1982), s/p fall ->subdural hematoma s/p craniotomy  (2013), seizure disorder,  poor gait and balance, dementia, C/O Skull base meningioma since 2010, it has gradually enlarged in size over the years, s/p left orbital enucleation (2018). The skull base meningioma has experienced significant growth and now involving the cavernous sinus and wrapping around supraclinoid segments of left ICA, expending to orbit and filling it's content from apex to anterior portion of the orbit.     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     Overnight Events: Not following commands, per night team.     ROS: Negative unless specified    VITALS:   T(C): 37.2 (11-01-19 @ 07:00), Max: 37.3 (11-01-19 @ 03:00)  HR: 100 (11-01-19 @ 07:00) (93 - 112)  BP: --  RR: 23 (11-01-19 @ 07:00) (16 - 25)  SpO2: 91% (11-01-19 @ 07:00) (91% - 100%)    10-31-19 @ 07:01  -  11-01-19 @ 07:00  --------------------------------------------------------  IN: 600 mL / OUT: 1020 mL / NET: -420 mL      LABS:  ABG - ( 31 Oct 2019 16:43 )  pH, Arterial: 7.39  pH, Blood: x     /  pCO2: 41    /  pO2: 129   / HCO3: 24    / Base Excess: -.3   /  SaO2: 99                            12.2   17.95 )-----------( 210      ( 31 Oct 2019 21:41 )             37.1     10-31    142  |  104  |  10  ----------------------------<  229<H>  3.4<L>   |  22  |  0.86    Ca    8.3<L>      31 Oct 2019 21:41  Phos  3.6     10-31  Mg     1.7     10-31      PT/INR - ( 31 Oct 2019 06:42 )   PT: 11.6 sec;   INR: 1.01 ratio           MEDS:  MEDICATIONS  (STANDING):  alteplase for catheter clearance 2 milliGRAM(s) Catheter once  amLODIPine   Tablet 5 milliGRAM(s) Oral daily  ceFAZolin   IVPB 2000 milliGRAM(s) IV Intermittent once  chlorhexidine 4% Liquid 1 Application(s) Topical <User Schedule>  chlorhexidine 4% Liquid 1 Application(s) Topical <User Schedule>  dexAMETHasone  Injectable 4 milliGRAM(s) IV Push every 6 hours  DULoxetine 60 milliGRAM(s) Oral daily  insulin lispro (HumaLOG) corrective regimen sliding scale   SubCutaneous every 6 hours  levETIRAcetam  IVPB 750 milliGRAM(s) IV Intermittent every 12 hours  levothyroxine Injectable 25 MICROGram(s) IV Push at bedtime  metoprolol tartrate 25 milliGRAM(s) Oral two times a day  polyethylene glycol 3350 17 Gram(s) Oral every 12 hours  pregabalin 100 milliGRAM(s) Oral every 12 hours  senna 2 Tablet(s) Oral at bedtime  simvastatin 20 milliGRAM(s) Oral at bedtime  sodium chloride 0.9%. 1000 milliLiter(s) (75 mL/Hr) IV Continuous <Continuous>    EXAMINATION:  General:  calm  HEENT:  left eye sowed shut,   Neuro:  moans, doesn't speak but mumbles name, not FC, HANSEN in the plane of the bed  Cards:  s1, s2 RRR  Respiratory:  lungs clear b/l   Adomen:  soft  Extremities:  no edema  Skin:  warm/dry 77 y/o [place] female presents to PST accompanied by daughter with H/O HTN, T2DM, Hyperlipidemia, Sinus/pituitary brain tumor s/p excision (1982), s/p fall ->subdural hematoma s/p craniotomy  (2013), seizure disorder,  poor gait and balance, dementia, C/O Skull base meningioma since 2010, it has gradually enlarged in size over the years, s/p left orbital enucleation (2018). The skull base meningioma has experienced significant growth and now involving the cavernous sinus and wrapping around supraclinoid segments of left ICA, expending to orbit and filling it's content from apex to anterior portion of the orbit.     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     Overnight Events: Not following commands, per night team.     ROS: Negative unless specified    ICU Vital Signs Last 24 Hrs  T(C): 37.5 (01 Nov 2019 11:00), Max: 37.5 (01 Nov 2019 11:00)  T(F): 99.5 (01 Nov 2019 11:00), Max: 99.5 (01 Nov 2019 11:00)  HR: 96 (01 Nov 2019 11:00) (93 - 112)  ABP: 139/63 (01 Nov 2019 11:00) (133/60 - 157/83)  ABP(mean): 94 (01 Nov 2019 11:00) (90 - 114)  RR: 17 (01 Nov 2019 11:00) (16 - 29)  SpO2: 97% (01 Nov 2019 11:00) (91% - 100%)        10-31-19 @ 07:01  -  11-01-19 @ 07:00  --------------------------------------------------------  IN: 600 mL / OUT: 1020 mL / NET: -420 mL      LABS:  ABG - ( 31 Oct 2019 16:43 )  pH, Arterial: 7.39  pH, Blood: x     /  pCO2: 41    /  pO2: 129   / HCO3: 24    / Base Excess: -.3   /  SaO2: 99                            12.2   17.95 )-----------( 210      ( 31 Oct 2019 21:41 )             37.1     10-31    142  |  104  |  10  ----------------------------<  229<H>  3.4<L>   |  22  |  0.86    Ca    8.3<L>      31 Oct 2019 21:41  Phos  3.6     10-31  Mg     1.7     10-31    CAPILLARY BLOOD GLUCOSE      POCT Blood Glucose.: 222 mg/dL (01 Nov 2019 11:05)  POCT Blood Glucose.: 194 mg/dL (01 Nov 2019 06:10)  POCT Blood Glucose.: 201 mg/dL (01 Nov 2019 00:08)        PT/INR - ( 31 Oct 2019 06:42 )   PT: 11.6 sec;   INR: 1.01 ratio           MEDS:  MEDICATIONS  (STANDING):  alteplase for catheter clearance 2 milliGRAM(s) Catheter once  amLODIPine   Tablet 5 milliGRAM(s) Oral daily  ceFAZolin   IVPB 2000 milliGRAM(s) IV Intermittent once  chlorhexidine 4% Liquid 1 Application(s) Topical <User Schedule>  chlorhexidine 4% Liquid 1 Application(s) Topical <User Schedule>  dexAMETHasone  Injectable 4 milliGRAM(s) IV Push every 6 hours  DULoxetine 60 milliGRAM(s) Oral daily  insulin lispro (HumaLOG) corrective regimen sliding scale   SubCutaneous every 6 hours  levETIRAcetam  IVPB 750 milliGRAM(s) IV Intermittent every 12 hours  levothyroxine Injectable 25 MICROGram(s) IV Push at bedtime  metoprolol tartrate 25 milliGRAM(s) Oral two times a day  polyethylene glycol 3350 17 Gram(s) Oral every 12 hours  pregabalin 100 milliGRAM(s) Oral every 12 hours  senna 2 Tablet(s) Oral at bedtime  simvastatin 20 milliGRAM(s) Oral at bedtime  sodium chloride 0.9%. 1000 milliLiter(s) (75 mL/Hr) IV Continuous <Continuous>    VA Duplex Lower Ext Vein Scan, Bilat (11.01.19 @ 11:33) >  IMPRESSION:     No evidence of deep venous thrombosis in either lower extremity.    CT Head No Cont (11.01.19 @ 08:47) >  IMPRESSION:    Markedly limited by patient motion.     Redemonstration of left pterional craniotomy. Evaluation of subjacent   extra axial collection is limited    Evaluation for acute intracranial hemorrhage or acute territorial infarct  is limited.    Slightly decreased rightward midline shift, currently 2 mm. No   hydrocephalus.            EXAMINATION:  General:  calm  HEENT:  left eye sowed shut,   Neuro:  moans, doesn't speak but mumbles name, not FC, HANSEN in the plane of the bed  Cards:  s1, s2 RRR  Respiratory:  lungs clear b/l   Adomen:  soft  Extremities:  no edema  Skin:  warm/dry 77 y/o Italian female presents to PST accompanied by daughter with H/O HTN, T2DM, Hyperlipidemia, Sinus/pituitary brain tumor s/p excision (1982), s/p fall ->subdural hematoma s/p craniotomy  (2013), seizure disorder,  poor gait and balance, dementia, C/O Skull base meningioma since 2010, it has gradually enlarged in size over the years, s/p left orbital enucleation (2018). The skull base meningioma has experienced significant growth and now involving the cavernous sinus and wrapping around supraclinoid segments of left ICA, expending to orbit and filling it's content from apex to anterior portion of the orbit.     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     Overnight Events: Not following commands, per night team.     ROS: Negative unless specified    ICU Vital Signs Last 24 Hrs  T(C): 37.5 (01 Nov 2019 11:00), Max: 37.5 (01 Nov 2019 11:00)  T(F): 99.5 (01 Nov 2019 11:00), Max: 99.5 (01 Nov 2019 11:00)  HR: 96 (01 Nov 2019 11:00) (93 - 112)  ABP: 139/63 (01 Nov 2019 11:00) (133/60 - 157/83)  ABP(mean): 94 (01 Nov 2019 11:00) (90 - 114)  RR: 17 (01 Nov 2019 11:00) (16 - 29)  SpO2: 97% (01 Nov 2019 11:00) (91% - 100%)        10-31-19 @ 07:01  -  11-01-19 @ 07:00  --------------------------------------------------------  IN: 600 mL / OUT: 1020 mL / NET: -420 mL      LABS:  ABG - ( 31 Oct 2019 16:43 )  pH, Arterial: 7.39  pH, Blood: x     /  pCO2: 41    /  pO2: 129   / HCO3: 24    / Base Excess: -.3   /  SaO2: 99                            12.2   17.95 )-----------( 210      ( 31 Oct 2019 21:41 )             37.1     10-31    142  |  104  |  10  ----------------------------<  229<H>  3.4<L>   |  22  |  0.86    Ca    8.3<L>      31 Oct 2019 21:41  Phos  3.6     10-31  Mg     1.7     10-31    CAPILLARY BLOOD GLUCOSE      POCT Blood Glucose.: 222 mg/dL (01 Nov 2019 11:05)  POCT Blood Glucose.: 194 mg/dL (01 Nov 2019 06:10)  POCT Blood Glucose.: 201 mg/dL (01 Nov 2019 00:08)        PT/INR - ( 31 Oct 2019 06:42 )   PT: 11.6 sec;   INR: 1.01 ratio           MEDS:  MEDICATIONS  (STANDING):  alteplase for catheter clearance 2 milliGRAM(s) Catheter once  amLODIPine   Tablet 5 milliGRAM(s) Oral daily  ceFAZolin   IVPB 2000 milliGRAM(s) IV Intermittent once  chlorhexidine 4% Liquid 1 Application(s) Topical <User Schedule>  chlorhexidine 4% Liquid 1 Application(s) Topical <User Schedule>  dexAMETHasone  Injectable 4 milliGRAM(s) IV Push every 6 hours  DULoxetine 60 milliGRAM(s) Oral daily  insulin lispro (HumaLOG) corrective regimen sliding scale   SubCutaneous every 6 hours  levETIRAcetam  IVPB 750 milliGRAM(s) IV Intermittent every 12 hours  levothyroxine Injectable 25 MICROGram(s) IV Push at bedtime  metoprolol tartrate 25 milliGRAM(s) Oral two times a day  polyethylene glycol 3350 17 Gram(s) Oral every 12 hours  pregabalin 100 milliGRAM(s) Oral every 12 hours  senna 2 Tablet(s) Oral at bedtime  simvastatin 20 milliGRAM(s) Oral at bedtime  sodium chloride 0.9%. 1000 milliLiter(s) (75 mL/Hr) IV Continuous <Continuous>    VA Duplex Lower Ext Vein Scan, Bilat (11.01.19 @ 11:33) >  IMPRESSION:     No evidence of deep venous thrombosis in either lower extremity.    CT Head No Cont (11.01.19 @ 08:47) >  IMPRESSION:    Markedly limited by patient motion.     Redemonstration of left pterional craniotomy. Evaluation of subjacent   extra axial collection is limited    Evaluation for acute intracranial hemorrhage or acute territorial infarct  is limited.    Slightly decreased rightward midline shift, currently 2 mm. No   hydrocephalus.            EXAMINATION:  General:  calm  HEENT:  left eye sowed shut, R pupil sluggishly reactive  Neuro:  Oriented x 1. Following commands.  RUE - 5/5. LUE 4/5 bicep flexion, 4+/5 tricep extension  RLE - 4+/5  LLE - 4/5  Cards:  s1, s2 RRR  Respiratory:  lungs clear b/l   Abdomen:  soft  Extremities:  no edema  Skin:  warm/dry

## 2019-11-01 NOTE — PROGRESS NOTE ADULT - ASSESSMENT
79 YO F with  Skull base meningioma since 2010, it has gradually enlarged in size over the years, s/p left orbital enucleation (2018). 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 SUSAN in place. No hematoma

## 2019-11-01 NOTE — PROGRESS NOTE ADULT - ASSESSMENT
79F s/p L orbital crani for meningioma with left eye closure  - Post op CTH / CTA wnl, patient slowly improving  - Repeat CTH in AM  - Cont neurochecks  - Keppra

## 2019-11-01 NOTE — PROGRESS NOTE ADULT - PROBLEM SELECTOR PLAN 1
-  S/P  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 with Left eye closure POD #1.   - Monitor SUSAN output.   - Pain meds prn.  - Neuro checks.   - Repeat CTH in AM.  - ENT will follow.   - Call with questions.

## 2019-11-02 NOTE — PROGRESS NOTE ADULT - SUBJECTIVE AND OBJECTIVE BOX
79 y/o [place] female presents to PST accompanied by daughter with H/O HTN, T2DM, Hyperlipidemia, Sinus/pituitary brain tumor s/p excision (1982), s/p fall ->subdural hematoma s/p craniotomy  (2013), seizure disorder,  poor gait and balance, dementia, C/O Skull base meningioma since 2010, it has gradually enlarged in size over the years, s/p left orbital enucleation (2018). The skull base meningioma has experienced significant growth and now involving the cavernous sinus and wrapping around supraclinoid segments of left ICA, expending to orbit and filling it's content from apex to anterior portion of the orbit.     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     Overnight Events:     ROS: negative [] unable to obtain as patient is comatose/intubated/aphasic []   VITALS:   T(C): 36.3 (11-02-19 @ 07:00), Max: 37.5 (11-01-19 @ 11:00)  HR: 91 (11-02-19 @ 08:00) (87 - 107)  BP: 133/98 (11-02-19 @ 08:00) (129/74 - 175/95)  RR: 22 (11-02-19 @ 08:00) (14 - 33)  SpO2: 100% (11-02-19 @ 08:00) (95% - 100%)    11-01-19 @ 07:01  -  11-02-19 @ 07:00  --------------------------------------------------------  IN: 2825 mL / OUT: 915 mL / NET: 1910 mL    11-02-19 @ 08:01  -  11-02-19 @ 09:08  --------------------------------------------------------  IN: 140 mL / OUT: 0 mL / NET: 140 mL      LABS:  ABG - ( 31 Oct 2019 16:43 )  pH, Arterial: 7.39  pH, Blood: x     /  pCO2: 41    /  pO2: 129   / HCO3: 24    / Base Excess: -.3   /  SaO2: 99                                      10.1   17.45 )-----------( 190      ( 01 Nov 2019 23:47 )             31.3     11-01    143  |  109<H>  |  10  ----------------------------<  203<H>  3.2<L>   |  24  |  0.74    Ca    7.9<L>      01 Nov 2019 23:47  Phos  2.5     11-01  Mg     1.9     11-01      MEDS:  MEDICATIONS  (STANDING):  alteplase for catheter clearance 2 milliGRAM(s) Catheter once  amLODIPine   Tablet 5 milliGRAM(s) Oral daily  chlorhexidine 4% Liquid 1 Application(s) Topical <User Schedule>  chlorhexidine 4% Liquid 1 Application(s) Topical <User Schedule>  dexAMETHasone  Injectable 4 milliGRAM(s) IV Push every 6 hours  dextrose 5%. 1000 milliLiter(s) (50 mL/Hr) IV Continuous <Continuous>  dextrose 50% Injectable 12.5 Gram(s) IV Push once  dextrose 50% Injectable 25 Gram(s) IV Push once  dextrose 50% Injectable 25 Gram(s) IV Push once  doxazosin 2 milliGRAM(s) Oral at bedtime  DULoxetine 60 milliGRAM(s) Oral daily  enoxaparin Injectable 40 milliGRAM(s) SubCutaneous <User Schedule>  insulin lispro (HumaLOG) corrective regimen sliding scale   SubCutaneous every 6 hours  insulin NPH human recombinant 8 Unit(s) SubCutaneous every 6 hours  levETIRAcetam  IVPB 750 milliGRAM(s) IV Intermittent every 12 hours  levothyroxine Injectable 25 MICROGram(s) IV Push at bedtime  metoprolol tartrate 25 milliGRAM(s) Oral two times a day  pantoprazole  Injectable 40 milliGRAM(s) IV Push daily  polyethylene glycol 3350 17 Gram(s) Oral every 12 hours  pregabalin 100 milliGRAM(s) Oral every 12 hours  senna 2 Tablet(s) Oral at bedtime  simvastatin 20 milliGRAM(s) Oral at bedtime          EXAMINATION:  General:  calm  HEENT:  left eye sowed shut, R pupil sluggishly reactive  Neuro:  Oriented x 1. Following commands.  RUE - 5/5. LUE 4/5 bicep flexion, 4+/5 tricep extension  RLE - 4+/5  LLE - 4/5  Cards:  s1, s2 RRR  Respiratory:  lungs clear b/l   Abdomen:  soft  Extremities:  no edema  Skin:  warm/dry 79 y/o [place] female presents to PST accompanied by daughter with H/O HTN, T2DM, Hyperlipidemia, Sinus/pituitary brain tumor s/p excision (1982), s/p fall ->subdural hematoma s/p craniotomy  (2013), seizure disorder,  poor gait and balance, dementia, C/O Skull base meningioma since 2010, it has gradually enlarged in size over the years, s/p left orbital enucleation (2018). The skull base meningioma has experienced significant growth and now involving the cavernous sinus and wrapping around supraclinoid segments of left ICA, expending to orbit and filling it's content from apex to anterior portion of the orbit.     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     Overnight Events:     ROS: negative     VITALS:   T(C): 36.3 (11-02-19 @ 07:00), Max: 37.5 (11-01-19 @ 11:00)  HR: 91 (11-02-19 @ 08:00) (87 - 107)  BP: 133/98 (11-02-19 @ 08:00) (129/74 - 175/95)  RR: 22 (11-02-19 @ 08:00) (14 - 33)  SpO2: 100% (11-02-19 @ 08:00) (95% - 100%)    11-01-19 @ 07:01  -  11-02-19 @ 07:00  --------------------------------------------------------  IN: 2825 mL / OUT: 915 mL / NET: 1910 mL- incontant    11-02-19 @ 08:01  -  11-02-19 @ 09:08  --------------------------------------------------------  IN: 140 mL / OUT: 0 mL / NET: 140 mL      LABS:  ABG - ( 31 Oct 2019 16:43 )  pH, Arterial: 7.39  pH, Blood: x     /  pCO2: 41    /  pO2: 129   / HCO3: 24    / Base Excess: -.3   /  SaO2: 99                                      10.1   17.45 )-----------( 190      ( 01 Nov 2019 23:47 )             31.3     11-01    143  |  109<H>  |  10  ----------------------------<  203<H>  3.2<L>   |  24  |  0.74    Ca    7.9<L>      01 Nov 2019 23:47  Phos  2.5     11-01  Mg     1.9     11-01    CAPILLARY BLOOD GLUCOSE      POCT Blood Glucose.: 219 mg/dL (02 Nov 2019 12:04)  POCT Blood Glucose.: 197 mg/dL (02 Nov 2019 05:47)  POCT Blood Glucose.: 186 mg/dL (01 Nov 2019 23:13)  POCT Blood Glucose.: 185 mg/dL (01 Nov 2019 17:12)        MEDS:  MEDICATIONS  (STANDING):  alteplase for catheter clearance 2 milliGRAM(s) Catheter once  amLODIPine   Tablet 5 milliGRAM(s) Oral daily  chlorhexidine 4% Liquid 1 Application(s) Topical <User Schedule>  chlorhexidine 4% Liquid 1 Application(s) Topical <User Schedule>  dexAMETHasone  Injectable 4 milliGRAM(s) IV Push every 8 hours  dextrose 5%. 1000 milliLiter(s) (50 mL/Hr) IV Continuous <Continuous>  dextrose 50% Injectable 12.5 Gram(s) IV Push once  dextrose 50% Injectable 25 Gram(s) IV Push once  dextrose 50% Injectable 25 Gram(s) IV Push once  doxazosin 2 milliGRAM(s) Oral at bedtime  DULoxetine 60 milliGRAM(s) Oral daily  enoxaparin Injectable 40 milliGRAM(s) SubCutaneous <User Schedule>  insulin lispro (HumaLOG) corrective regimen sliding scale   SubCutaneous every 6 hours  insulin NPH human recombinant 10 Unit(s) SubCutaneous every 6 hours  levETIRAcetam  IVPB 750 milliGRAM(s) IV Intermittent every 12 hours  levothyroxine Injectable 25 MICROGram(s) IV Push at bedtime  metoprolol tartrate 25 milliGRAM(s) Oral two times a day  pantoprazole  Injectable 40 milliGRAM(s) po Push daily  polyethylene glycol 3350 17 Gram(s) Oral every 12 hours  pregabalin 100 milliGRAM(s) Oral every 12 hours  senna 2 Tablet(s) Oral at bedtime  simvastatin 20 milliGRAM(s) Oral at bedtime          EXAMINATION:  General:  calm  HEENT:  left eye sowed shut, R pupil sluggishly reactive  Neuro:  Oriented x 1. Following commands.  RUE - 5/5. LUE 4/5 bicep flexion, 4+/5 tricep extension  RLE - 4+/5  LLE - 4/5  Cards:  s1, s2 RRR  Respiratory:  lungs clear b/l   Abdomen:  soft  Extremities:  no edema  Skin:  warm/dry 77 y/o Afghan female presents to PST accompanied by daughter with H/O HTN, T2DM, Hyperlipidemia, Sinus/pituitary brain tumor s/p excision (1982), s/p fall ->subdural hematoma s/p craniotomy  (2013), seizure disorder,  poor gait and balance, dementia, C/O Skull base meningioma since 2010, it has gradually enlarged in size over the years, s/p left orbital enucleation (2018). The skull base meningioma has experienced significant growth and now involving the cavernous sinus and wrapping around supraclinoid segments of left ICA, expending to orbit and filling it's content from apex to anterior portion of the orbit.     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     Overnight Events: Family reports patient sees rats on wall - concern for ICU delirium vs 2/2 to steroids.     ROS: negative     VITALS:   T(C): 36.3 (11-02-19 @ 07:00), Max: 37.5 (11-01-19 @ 11:00)  HR: 91 (11-02-19 @ 08:00) (87 - 107)  BP: 133/98 (11-02-19 @ 08:00) (129/74 - 175/95)  RR: 22 (11-02-19 @ 08:00) (14 - 33)  SpO2: 100% (11-02-19 @ 08:00) (95% - 100%)    11-01-19 @ 07:01  -  11-02-19 @ 07:00  --------------------------------------------------------  IN: 2825 mL / OUT: 915 mL / NET: 1910 mL- incontinent    11-02-19 @ 08:01  -  11-02-19 @ 09:08  --------------------------------------------------------  IN: 140 mL / OUT: 0 mL / NET: 140 mL      LABS:  ABG - ( 31 Oct 2019 16:43 )  pH, Arterial: 7.39  pH, Blood: x     /  pCO2: 41    /  pO2: 129   / HCO3: 24    / Base Excess: -.3   /  SaO2: 99                            10.1   17.45 )-----------( 190      ( 01 Nov 2019 23:47 )             31.3     11-01    143  |  109<H>  |  10  ----------------------------<  203<H>  3.2<L>   |  24  |  0.74    Ca    7.9<L>      01 Nov 2019 23:47  Phos  2.5     11-01  Mg     1.9     11-01    CAPILLARY BLOOD GLUCOSE      POCT Blood Glucose.: 219 mg/dL (02 Nov 2019 12:04)  POCT Blood Glucose.: 197 mg/dL (02 Nov 2019 05:47)  POCT Blood Glucose.: 186 mg/dL (01 Nov 2019 23:13)  POCT Blood Glucose.: 185 mg/dL (01 Nov 2019 17:12)        MEDS:  MEDICATIONS  (STANDING):  alteplase for catheter clearance 2 milliGRAM(s) Catheter once  amLODIPine   Tablet 5 milliGRAM(s) Oral daily  chlorhexidine 4% Liquid 1 Application(s) Topical <User Schedule>  chlorhexidine 4% Liquid 1 Application(s) Topical <User Schedule>  dexAMETHasone  Injectable 4 milliGRAM(s) IV Push every 8 hours  dextrose 5%. 1000 milliLiter(s) (50 mL/Hr) IV Continuous <Continuous>  dextrose 50% Injectable 12.5 Gram(s) IV Push once  dextrose 50% Injectable 25 Gram(s) IV Push once  dextrose 50% Injectable 25 Gram(s) IV Push once  doxazosin 2 milliGRAM(s) Oral at bedtime  DULoxetine 60 milliGRAM(s) Oral daily  enoxaparin Injectable 40 milliGRAM(s) SubCutaneous <User Schedule>  insulin lispro (HumaLOG) corrective regimen sliding scale   SubCutaneous every 6 hours  insulin NPH human recombinant 10 Unit(s) SubCutaneous every 6 hours  levETIRAcetam  IVPB 750 milliGRAM(s) IV Intermittent every 12 hours  levothyroxine Injectable 25 MICROGram(s) IV Push at bedtime  metoprolol tartrate 25 milliGRAM(s) Oral two times a day  pantoprazole  Injectable 40 milliGRAM(s) po Push daily  polyethylene glycol 3350 17 Gram(s) Oral every 12 hours  pregabalin 100 milliGRAM(s) Oral every 12 hours  senna 2 Tablet(s) Oral at bedtime  simvastatin 20 milliGRAM(s) Oral at bedtime          EXAMINATION:  General:  calm  HEENT:  left eye sowed shut, R pupil sluggishly reactive  Neuro:  Oriented x 1. Following commands.  RUE - 5/5. LUE 4/5 bicep flexion, 4+/5 tricep extension  RLE - 4+/5  LLE - 4/5  Cards:  s1, s2 RRR  Respiratory:  lungs clear b/l   Abdomen:  soft  Extremities:  no edema  Skin:  warm/dry

## 2019-11-02 NOTE — OCCUPATIONAL THERAPY INITIAL EVALUATION ADULT - PERTINENT HX OF CURRENT PROBLEM, REHAB EVAL
77 y/o F C/O Skull base meningioma since 2010, it has gradually enlarged in size over the years, s/p left orbital enucleation (2018). The skull base meningioma has experienced significant growth and now involving the cavernous sinus and wrapping around supraclinoid segments of left ICA, expending to orbit and filling it's content from apex to anterior portion of the orbit. Now s/p for 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 on 10/31/19.

## 2019-11-02 NOTE — PHYSICAL THERAPY INITIAL EVALUATION ADULT - PRECAUTIONS/LIMITATIONS, REHAB EVAL
fall precautions/CTH: Markedly limited by patient motion. Redemonstration of left pterional craniotomy. Evaluation of subjacent extra axial collection is limited. Evaluation for acute intracranial hemorrhage or acute territorial infarct is limited.Slightly decreased rightward midline shift, currently 2 mm. No hydrocephalus.

## 2019-11-02 NOTE — OCCUPATIONAL THERAPY INITIAL EVALUATION ADULT - BALANCE TRAINING, PT EVAL
GOAL: Pt will increase static standing balance by 1/2 grade to facilitate ability to perform ADLs and functional mobility within 4 weeks

## 2019-11-02 NOTE — PHYSICAL THERAPY INITIAL EVALUATION ADULT - CRITERIA FOR SKILLED THERAPEUTIC INTERVENTIONS
impairments found/anticipated discharge recommendation/functional limitations in following categories/predicted duration of therapy intervention/anticipated equipment needs at discharge/rehab potential

## 2019-11-02 NOTE — PROGRESS NOTE ADULT - PROBLEM SELECTOR PLAN 1
-  S/P  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 with Left eye closure POD #2.   - Monitor SUSAN output.   - Pain meds prn.  - Neuro checks.   - Repeat CTH in AM.  - ENT will follow.   - Call with questions.

## 2019-11-02 NOTE — PROGRESS NOTE ADULT - SUBJECTIVE AND OBJECTIVE BOX
ENT ISSUE/POD:  S/P Left  orbital craniotomy for meningioma with left eye closure POD # 2.      HPI: 77 YO F with PMH of  HTN, T2DM, Hyperlipidemia, Sinus/pituitary brain tumor s/p excision (1982), s/p fall ->subdural hematoma s/p craniotomy  (2013), seizure disorder,  poor gait and balance, dementia, C/O Skull base meningioma since 2010, it has gradually enlarged in size over the years, s/p left orbital enucleation (2018). The skull base meningioma has experienced significant growth and now involving the cavernous sinus and wrapping around supraclinoid segments of left ICA, expending to orbit and filling it's content from apex to anterior portion of the orbit. Now S/P  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 with Left eye closure POD #2. No acute events overnight.          PAST MEDICAL & SURGICAL HISTORY:  History of seizure disorder  Irritable bowel syndrome with diarrhea  [place] (hard of hearing)  GERD (gastroesophageal reflux disease)  Frequent falls  Gait instability  Chronic depression  Legally blind  Sedentary lifestyle  Adrenal incidentaloma  Brain tumor: sellar mass.  hx of L eye blidness/proptosis from a ?tumor  Sciatica: both hips  Arthritis: has pain in hips and back  HLD (Hyperlipidemia)  Non-Insulin Dependent Diabetes Mellitus: diagnosed in 1992  Hypertension  S/P myringotomy with insertion of tube: 2019  History of eye enucleation: 2018 s/p Left orbital enucleation  Traumatic subdural hematoma: s/p evacuation in 2013  Pituitary Tumor: excision in 1992  S/P Cholecystectomy: open in 1979  H/O: Hysterectomy: abdominal with right oophorectomy in 1982  S/P Tubal Ligation: in 1978    Allergies    aspirin (Nausea; Other)  D.A. Chewable (Other)  erythromycin (Rash)  Naprosyn (Other)  Talwin (Unknown)  Talwin Compound (Urticaria; Rash)  Talwin Lactate (Other)    Intolerances      MEDICATIONS  (STANDING):  alteplase for catheter clearance 2 milliGRAM(s) Catheter once  amLODIPine   Tablet 5 milliGRAM(s) Oral daily  chlorhexidine 4% Liquid 1 Application(s) Topical <User Schedule>  chlorhexidine 4% Liquid 1 Application(s) Topical <User Schedule>  dexAMETHasone  Injectable 4 milliGRAM(s) IV Push every 8 hours  dextrose 5%. 1000 milliLiter(s) (50 mL/Hr) IV Continuous <Continuous>  dextrose 50% Injectable 12.5 Gram(s) IV Push once  dextrose 50% Injectable 25 Gram(s) IV Push once  dextrose 50% Injectable 25 Gram(s) IV Push once  doxazosin 2 milliGRAM(s) Oral at bedtime  DULoxetine 60 milliGRAM(s) Oral daily  enoxaparin Injectable 40 milliGRAM(s) SubCutaneous <User Schedule>  insulin lispro (HumaLOG) corrective regimen sliding scale   SubCutaneous every 6 hours  insulin NPH human recombinant 10 Unit(s) SubCutaneous every 6 hours  levETIRAcetam  IVPB 750 milliGRAM(s) IV Intermittent every 12 hours  levothyroxine Injectable 25 MICROGram(s) IV Push at bedtime  metoprolol tartrate 25 milliGRAM(s) Oral two times a day  pantoprazole  Injectable 40 milliGRAM(s) IV Push daily  polyethylene glycol 3350 17 Gram(s) Oral every 12 hours  pregabalin 100 milliGRAM(s) Oral every 12 hours  senna 2 Tablet(s) Oral at bedtime  simvastatin 20 milliGRAM(s) Oral at bedtime    MEDICATIONS  (PRN):  acetaminophen   Tablet .. 650 milliGRAM(s) Oral every 6 hours PRN Temp greater or equal to 38C (100.4F), Mild Pain (1 - 3)  dextrose 40% Gel 15 Gram(s) Oral once PRN Blood Glucose LESS THAN 70 milliGRAM(s)/deciliter  glucagon  Injectable 1 milliGRAM(s) IntraMuscular once PRN Glucose LESS THAN 70 milligrams/deciliter  sodium chloride 0.9% lock flush 10 milliLiter(s) IV Push every 1 hour PRN Pre/post blood products, medications, blood draw, and to maintain line patency      Social History: see consult    Family history: see consult    ROS:   ENT: all negative except as noted in HPI   Pulm: denies SOB, cough, hemoptysis  Neuro: denies numbness/tingling, loss of sensation  Endo: denies heat/cold intolerance, excessive sweating      Vital Signs Last 24 Hrs  T(C): 36.2 (02 Nov 2019 11:00), Max: 37.5 (01 Nov 2019 23:00)  T(F): 97.1 (02 Nov 2019 11:00), Max: 99.5 (01 Nov 2019 23:00)  HR: 109 (02 Nov 2019 14:00) (87 - 109)  BP: 139/105 (02 Nov 2019 14:00) (129/74 - 175/95)  BP(mean): 115 (02 Nov 2019 14:00) (89 - 116)  RR: 20 (02 Nov 2019 14:00) (14 - 33)  SpO2: 98% (02 Nov 2019 14:00) (85% - 100%)                          10.1   17.45 )-----------( 190      ( 01 Nov 2019 23:47 )             31.3    11-01    143  |  109<H>  |  10  ----------------------------<  203<H>  3.2<L>   |  24  |  0.74    Ca    7.9<L>      01 Nov 2019 23:47  Phos  2.5     11-01  Mg     1.9     11-01         PHYSICAL EXAM:  Gen: NAD.  Skin: No rashes, bruises, or lesions.  Head: NC/AT.  Face: erythema, or fluctuance. Parotid glands soft without mass.  Eyes: Left eye closure with sutures in place, mild periorbital swelling, site c/d/i. SUSAN x1 in place, draining serosanguinous output.   Nose: Nares bilaterally patent, no discharge.  Mouth: No Stridor / Drooling / Trismus.  Mucosa moist, tongue/uvula midline, oropharynx clear.  Neck: Flat, supple, no lymphadenopathy, trachea midline, no masses.  Lymphatic: No lymphadenopathy.  Resp: breathing easily, no stridor.  Neuro: unable to obtain. ENT ISSUE/POD:  S/P Left  orbital craniotomy for meningioma with left eye closure POD # 2.      HPI: 77 YO F with PMH of  HTN, T2DM, Hyperlipidemia, Sinus/pituitary brain tumor s/p excision (1982), s/p fall ->subdural hematoma s/p craniotomy  (2013), seizure disorder,  poor gait and balance, dementia, C/O Skull base meningioma since 2010, it has gradually enlarged in size over the years, s/p left orbital enucleation (2018). The skull base meningioma has experienced significant growth and now involving the cavernous sinus and wrapping around supraclinoid segments of left ICA, expending to orbit and filling it's content from apex to anterior portion of the orbit. Now S/P  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 with Left eye closure POD #2. No acute events overnight.          PAST MEDICAL & SURGICAL HISTORY:  History of seizure disorder  Irritable bowel syndrome with diarrhea  [place] (hard of hearing)  GERD (gastroesophageal reflux disease)  Frequent falls  Gait instability  Chronic depression  Legally blind  Sedentary lifestyle  Adrenal incidentaloma  Brain tumor: sellar mass.  hx of L eye blidness/proptosis from a ?tumor  Sciatica: both hips  Arthritis: has pain in hips and back  HLD (Hyperlipidemia)  Non-Insulin Dependent Diabetes Mellitus: diagnosed in 1992  Hypertension  S/P myringotomy with insertion of tube: 2019  History of eye enucleation: 2018 s/p Left orbital enucleation  Traumatic subdural hematoma: s/p evacuation in 2013  Pituitary Tumor: excision in 1992  S/P Cholecystectomy: open in 1979  H/O: Hysterectomy: abdominal with right oophorectomy in 1982  S/P Tubal Ligation: in 1978    Allergies    aspirin (Nausea; Other)  D.A. Chewable (Other)  erythromycin (Rash)  Naprosyn (Other)  Talwin (Unknown)  Talwin Compound (Urticaria; Rash)  Talwin Lactate (Other)    Intolerances      MEDICATIONS  (STANDING):  alteplase for catheter clearance 2 milliGRAM(s) Catheter once  amLODIPine   Tablet 5 milliGRAM(s) Oral daily  chlorhexidine 4% Liquid 1 Application(s) Topical <User Schedule>  chlorhexidine 4% Liquid 1 Application(s) Topical <User Schedule>  dexAMETHasone  Injectable 4 milliGRAM(s) IV Push every 8 hours  dextrose 5%. 1000 milliLiter(s) (50 mL/Hr) IV Continuous <Continuous>  dextrose 50% Injectable 12.5 Gram(s) IV Push once  dextrose 50% Injectable 25 Gram(s) IV Push once  dextrose 50% Injectable 25 Gram(s) IV Push once  doxazosin 2 milliGRAM(s) Oral at bedtime  DULoxetine 60 milliGRAM(s) Oral daily  enoxaparin Injectable 40 milliGRAM(s) SubCutaneous <User Schedule>  insulin lispro (HumaLOG) corrective regimen sliding scale   SubCutaneous every 6 hours  insulin NPH human recombinant 10 Unit(s) SubCutaneous every 6 hours  levETIRAcetam  IVPB 750 milliGRAM(s) IV Intermittent every 12 hours  levothyroxine Injectable 25 MICROGram(s) IV Push at bedtime  metoprolol tartrate 25 milliGRAM(s) Oral two times a day  pantoprazole  Injectable 40 milliGRAM(s) IV Push daily  polyethylene glycol 3350 17 Gram(s) Oral every 12 hours  pregabalin 100 milliGRAM(s) Oral every 12 hours  senna 2 Tablet(s) Oral at bedtime  simvastatin 20 milliGRAM(s) Oral at bedtime    MEDICATIONS  (PRN):  acetaminophen   Tablet .. 650 milliGRAM(s) Oral every 6 hours PRN Temp greater or equal to 38C (100.4F), Mild Pain (1 - 3)  dextrose 40% Gel 15 Gram(s) Oral once PRN Blood Glucose LESS THAN 70 milliGRAM(s)/deciliter  glucagon  Injectable 1 milliGRAM(s) IntraMuscular once PRN Glucose LESS THAN 70 milligrams/deciliter  sodium chloride 0.9% lock flush 10 milliLiter(s) IV Push every 1 hour PRN Pre/post blood products, medications, blood draw, and to maintain line patency      Social History: see consult    Family history: see consult    ROS:   ENT: all negative except as noted in HPI   Pulm: denies SOB, cough, hemoptysis  Neuro: denies numbness/tingling, loss of sensation  Endo: denies heat/cold intolerance, excessive sweating      Vital Signs Last 24 Hrs  T(C): 36.2 (02 Nov 2019 11:00), Max: 37.5 (01 Nov 2019 23:00)  T(F): 97.1 (02 Nov 2019 11:00), Max: 99.5 (01 Nov 2019 23:00)  HR: 109 (02 Nov 2019 14:00) (87 - 109)  BP: 139/105 (02 Nov 2019 14:00) (129/74 - 175/95)  BP(mean): 115 (02 Nov 2019 14:00) (89 - 116)  RR: 20 (02 Nov 2019 14:00) (14 - 33)  SpO2: 98% (02 Nov 2019 14:00) (85% - 100%)    SUSAN 5CCs                           10.1   17.45 )-----------( 190      ( 01 Nov 2019 23:47 )             31.3    11-01    143  |  109<H>  |  10  ----------------------------<  203<H>  3.2<L>   |  24  |  0.74    Ca    7.9<L>      01 Nov 2019 23:47  Phos  2.5     11-01  Mg     1.9     11-01         PHYSICAL EXAM:  Gen: NAD.  Skin: No rashes, bruises, or lesions.  Head: NC/AT.  Face: erythema, or fluctuance. Parotid glands soft without mass.  Eyes: Left eye closure with sutures in place, mild periorbital swelling, site c/d/i. SUSAN x1 in place, draining serosanguinous output.   Nose: Nares bilaterally patent, no discharge.  Mouth: No Stridor / Drooling / Trismus.  Mucosa moist, tongue/uvula midline, oropharynx clear.  Neck: Flat, supple, no lymphadenopathy, trachea midline, no masses.  Lymphatic: No lymphadenopathy.  Resp: breathing easily, no stridor.  Neuro: unable to obtain.

## 2019-11-02 NOTE — PROGRESS NOTE ADULT - ASSESSMENT
79 YO F with  Skull base meningioma since 2010, it has gradually enlarged in size over the years, s/p left orbital enucleation (2018). Now S/P  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 with Left eye closure POD #2. SUSAN in place. No hematoma

## 2019-11-02 NOTE — OCCUPATIONAL THERAPY INITIAL EVALUATION ADULT - LIVES WITH, PROFILE
Pt lives alone in a house with a 24/7 HHA who assists with ADL, +6 GEETA, +12 steps to bedroom, +shower tub

## 2019-11-02 NOTE — OCCUPATIONAL THERAPY INITIAL EVALUATION ADULT - AMBULATORY DEVICES NEEDED
pt has a w/c, RW, and cane. Per daughter, pt was using a cane with some assistance to walk within the house/yes

## 2019-11-02 NOTE — PROGRESS NOTE ADULT - ASSESSMENT
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 on 10/31/19       NEURO:    Neurochecks q4 hrs,   CT Head - pneumocephalus  Decadron 4 q6H  keppra 750 q12H (home med)  Eye SUSAN - ENT- monitor Output - 30 cc since OR  Also, contd on home med of Duloxetine 60 daily, Pregabalin 100 mg BID  Activity: [X] mobilize as tolerated [] Bedrest [X] PT [X] OT     PULM:  O2 sat > 92%  Incentive spirometry, if able    CV:  SBP goal 100 - 160  HTN: c/w beta blockers, amlodipine   HLD - c/w statin     RENAL: Urinary retention  Cardura 2mg q day - hold if SBP < 100   Fluids:  IVL once TF at goal    GI:    Diet: Start TF  GI prophylaxis [] not indicated [X] PPI (home med)  Bowel regimen [x] miralax [x] senna [] other:    ENDO:   Goal euglycemia (-180)  Home dose Lantus 13 qHS  SSI / 18 hrs -10u  Will start patient on NPH 8q8, also on SSI    HEME/ONC:  VTE prophylaxis: [X] SCDs [X] start Lovenox 40 sc qHS    ID:  afebrile     SOCIAL/FAMILY:  [x] awaiting [] updated at bedside [] family meeting    CODE STATUS:  [x] Full Code [] DNR [] DNI [] Palliative/Comfort Care    DISPOSITION: Floor    [x] Patient is not critically ill yet medically complex     Critical Care Attestation:  Attending with resident/fellow:  I have personally provided 35 minutes of  time concurrently with the resident/fellow. This time excludes time spent on separate procedures and time spent teaching. I have reviewed the resident/fellow’s documentation and I agree with the assessment and plan of care.    Time spent: 35  minutes    Contact: [phone] 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 on 10/31/19       NEURO:    Neurochecks q4 hrs,   CT Head - pneumocephalus  Decadron decreased to 4q8 2/2 to delirusm  Keppra 750 q12H (home med)  Eye SUSAN - ENT- monitor Output - 30 cc since OR  Also, contd on home med of Duloxetine 60 daily, Pregabalin 100 mg BID  Activity: [X] mobilize as tolerated [] Bedrest [X] PT [X] OT     PULM:  O2 sat > 92%  Incentive spirometry, if able    CV:  SBP goal 100 - 160  HTN: c/w beta blockers, amlodipine   HLD - c/w statin   DC Cordis    RENAL: Urinary retention  Cardura 2mg q day - hold if SBP < 100   Fluids:  IVL    GI:    Diet: on TF  GI prophylaxis [] not indicated [X] PPI (home med)  Bowel regimen [x] miralax [x] senna [] other:  No BM during hospitalization    ENDO:   Goal euglycemia (-180)  Home dose Lantus 13 qHS  SSI / 18 hrs -10u  Increase NPH to 10q6H, SSI     HEME/ONC:  VTE prophylaxis: [X] SCDs [X] on Lovenox 40 sc qHS    ID:  afebrile     SOCIAL/FAMILY: [x] updated at bedside     CODE STATUS:  [x] Full Code    DISPOSITION: Floor    [x] Patient is not critically ill yet medically complex     Critical Care Attestation:  Attending with resident/fellow:  I have personally provided 35 minutes of  time concurrently with the resident/fellow. This time excludes time spent on separate procedures and time spent teaching. I have reviewed the resident/fellow’s documentation and I agree with the assessment and plan of care.    Time spent: 35  minutes    Contact: [phone]

## 2019-11-02 NOTE — PHYSICAL THERAPY INITIAL EVALUATION ADULT - PERTINENT HX OF CURRENT PROBLEM, REHAB EVAL
Pt is 78F admitted 10/31/19 PMHx HTN, DM2, Hyperlipidemia, Sinus/pituitary brain tumor s/p excision(1982), s/p fall w/SDH s/p craniotomy(2013), seizure disorder, poor gait & balance, dementia, Skull base meningioma since 2010, s/p left orbital enucleation (2018). The skull base meningioma has experienced significant growth. Now Scheduled for 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.

## 2019-11-02 NOTE — OCCUPATIONAL THERAPY INITIAL EVALUATION ADULT - DIAGNOSIS, OT EVAL
Pt presents with pain, decreased command following, impaired cognition, impaired vision, decreased ROM, strength, endurance, balance, and coordination, all impacting ability to perform ADLs and functional mobility.

## 2019-11-02 NOTE — PHYSICAL THERAPY INITIAL EVALUATION ADULT - GENERAL OBSERVATIONS, REHAB EVAL
received semisupine in bed, A&OX1-2, following simple commands, willing to participate, dtr at bedside, s/p left crani for meningioma rxn (10/31), +NGT, +supplemental 02, +IV's, +SUSAN, +NSCU monitoring, left eye swollen.

## 2019-11-02 NOTE — PHYSICAL THERAPY INITIAL EVALUATION ADULT - GAIT DEVIATIONS NOTED, PT EVAL
decreased stride length/decreased weight-shifting ability/decreased jimmie/decreased velocity of limb motion/decreased step length

## 2019-11-02 NOTE — OCCUPATIONAL THERAPY INITIAL EVALUATION ADULT - ADL RETRAINING, OT EVAL
GOAL: Patient will require SUP with UB dressing within 4 weeks GOAL: Pt will perform LB dressing with MIN A in 4 weeks

## 2019-11-02 NOTE — PHYSICAL THERAPY INITIAL EVALUATION ADULT - ADDITIONAL COMMENTS
Pt resides in private home, with 24 hour HHA, 6 steps to enter. PTA pt ambulatory short distances with cane, owned commode, shower chair, RW, cane.

## 2019-11-03 NOTE — PROGRESS NOTE ADULT - ASSESSMENT
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 on 10/31/19       NEURO:    Neurochecks q4 hrs,   CT Head - pneumocephalus  Decadron 4q8 2/2   Keppra 750 q12H (home med)  Eye SUSAN - ENT- monitor Output   Also, contd on home med of Duloxetine 60 daily, Pregabalin 100 mg BID  Activity: [X] mobilize as tolerated [] Bedrest [X] PT [X] OT     PULM:  O2 sat > 92%  Incentive spirometry, if able    CV:  SBP goal 100 - 160  HTN: c/w beta blockers, amlodipine   HLD - c/w statin   DC Cordis    RENAL: Urinary retention  Cardura 2mg q day - hold if SBP < 100   Fluids:  IVL    GI:    Diet: on TF  GI prophylaxis [] not indicated [X] PPI (home med)  Bowel regimen [x] miralax [x] senna [] other:  No BM during hospitalization    ENDO:   Goal euglycemia (-180)  Home dose Lantus 13 qHS  SSI / 18 hrs -10u  Increase NPH to 10q6H, SSI     HEME/ONC:  VTE prophylaxis: [X] SCDs [X] on Lovenox 40 sc qHS    ID:  afebrile     SOCIAL/FAMILY: [x] updated at bedside     [id number]    Dispo: [facility]ACOB

## 2019-11-03 NOTE — PROGRESS NOTE ADULT - ASSESSMENT
77 YO F with  Skull base meningioma since 2010, it has gradually enlarged in size over the years, s/p left orbital enucleation (2018). Now S/P  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 with Left eye closure POD #3. SUSAN in place. No hematoma

## 2019-11-03 NOTE — PROGRESS NOTE ADULT - PROBLEM SELECTOR PLAN 1
S/P  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 with Left eye closure POD #3.   - Monitor SUSAN output.   - Pain meds prn.  - Neuro checks.   - Repeat CTH in AM.  - ENT will follow.   - Call with questions.

## 2019-11-03 NOTE — PROGRESS NOTE ADULT - SUBJECTIVE AND OBJECTIVE BOX
ENT ISSUE/POD: S/P Left  orbital craniotomy for meningioma with left eye closure POD # 3.    HPI: 79 YO F with PMH of  HTN, T2DM, Hyperlipidemia, Sinus/pituitary brain tumor s/p excision (1982), s/p fall ->subdural hematoma s/p craniotomy  (2013), seizure disorder,  poor gait and balance, dementia, C/O Skull base meningioma since 2010, it has gradually enlarged in size over the years, s/p left orbital enucleation (2018). The skull base meningioma has experienced significant growth and now involving the cavernous sinus and wrapping around supraclinoid segments of left ICA, expending to orbit and filling it's content from apex to anterior portion of the orbit. Now S/P  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 with Left eye closure POD #3. No acute events overnight.            PAST MEDICAL & SURGICAL HISTORY:  History of seizure disorder  Irritable bowel syndrome with diarrhea  [place] (hard of hearing)  GERD (gastroesophageal reflux disease)  Frequent falls  Gait instability  Chronic depression  Legally blind  Sedentary lifestyle  Adrenal incidentaloma  Brain tumor: sellar mass.  hx of L eye blidness/proptosis from a ?tumor  Sciatica: both hips  Arthritis: has pain in hips and back  HLD (Hyperlipidemia)  Non-Insulin Dependent Diabetes Mellitus: diagnosed in 1992  Hypertension  S/P myringotomy with insertion of tube: 2019  History of eye enucleation: 2018 s/p Left orbital enucleation  Traumatic subdural hematoma: s/p evacuation in 2013  Pituitary Tumor: excision in 1992  S/P Cholecystectomy: open in 1979  H/O: Hysterectomy: abdominal with right oophorectomy in 1982  S/P Tubal Ligation: in 1978    Allergies    aspirin (Nausea; Other)  D.A. Chewable (Other)  erythromycin (Rash)  Naprosyn (Other)  Talwin (Unknown)  Talwin Compound (Urticaria; Rash)  Talwin Lactate (Other)    Intolerances      MEDICATIONS  (STANDING):  amLODIPine   Tablet 5 milliGRAM(s) Oral daily  dexAMETHasone     Tablet 4 milliGRAM(s) Oral every 8 hours  dextrose 5%. 1000 milliLiter(s) (50 mL/Hr) IV Continuous <Continuous>  dextrose 50% Injectable 12.5 Gram(s) IV Push once  dextrose 50% Injectable 25 Gram(s) IV Push once  doxazosin 2 milliGRAM(s) Oral at bedtime  DULoxetine 60 milliGRAM(s) Oral daily  enoxaparin Injectable 40 milliGRAM(s) SubCutaneous <User Schedule>  insulin lispro (HumaLOG) corrective regimen sliding scale   SubCutaneous every 6 hours  insulin NPH human recombinant 10 Unit(s) SubCutaneous every 6 hours  levETIRAcetam 750 milliGRAM(s) Oral two times a day  levothyroxine 50 MICROGram(s) Oral daily  metoprolol tartrate 25 milliGRAM(s) Oral two times a day  pantoprazole  Injectable 40 milliGRAM(s) IV Push daily  polyethylene glycol 3350 17 Gram(s) Oral every 12 hours  pregabalin 100 milliGRAM(s) Oral every 12 hours  senna 2 Tablet(s) Oral at bedtime  simvastatin 20 milliGRAM(s) Oral at bedtime    MEDICATIONS  (PRN):  acetaminophen   Tablet .. 650 milliGRAM(s) Oral every 6 hours PRN Temp greater or equal to 38C (100.4F), Mild Pain (1 - 3)  dextrose 40% Gel 15 Gram(s) Oral once PRN Blood Glucose LESS THAN 70 milliGRAM(s)/deciliter  glucagon  Injectable 1 milliGRAM(s) IntraMuscular once PRN Glucose LESS THAN 70 milligrams/deciliter      Social History: see consult    Family history: see consult    ROS:   ENT: all negative except as noted in HPI   Pulm: denies SOB, cough, hemoptysis  Neuro: denies numbness/tingling, loss of sensation  Endo: denies heat/cold intolerance, excessive sweating      Vital Signs Last 24 Hrs  T(C): 36.6 (03 Nov 2019 12:22), Max: 37.4 (02 Nov 2019 15:00)  T(F): 97.8 (03 Nov 2019 12:22), Max: 99.3 (02 Nov 2019 15:00)  HR: 102 (03 Nov 2019 12:22) (87 - 111)  BP: 161/101 (03 Nov 2019 12:22) (129/87 - 170/98)  BP(mean): 101 (03 Nov 2019 03:00) (90 - 132)  RR: 19 (03 Nov 2019 12:22) (19 - 25)  SpO2: 90% (03 Nov 2019 12:22) (90% - 100%)     SUSAN: 0/0/1 CCs                     10.4   17.70 )-----------( 193      ( 03 Nov 2019 00:15 )             33.5    11-01    143  |  109<H>  |  10  ----------------------------<  203<H>  3.2<L>   |  24  |  0.74    Ca    7.9<L>      01 Nov 2019 23:47  Phos  2.5     11-01  Mg     1.9     11-01         PHYSICAL EXAM:  Gen: NAD.  Skin: No rashes, bruises, or lesions.  Head: NC/AT.  Face: erythema, or fluctuance. Parotid glands soft without mass.  Eyes: Left eye closure with sutures in place, mild periorbital swelling, site c/d/i. SUSAN x1 in place, draining serosanguinous output.   Nose: Nares bilaterally patent, no discharge.  Mouth: No Stridor / Drooling / Trismus.  Mucosa moist, tongue/uvula midline, oropharynx clear.  Neck: Flat, supple, no lymphadenopathy, trachea midline, no masses.  Lymphatic: No lymphadenopathy.  Resp: breathing easily, no stridor.  Neuro: unable to obtain.

## 2019-11-03 NOTE — PROGRESS NOTE ADULT - SUBJECTIVE AND OBJECTIVE BOX
SUBJECTIVE: no complaints, asked to be left alone to go back to sleep    Vital Signs Last 24 Hrs  T(C): 37.2 (11-03-19 @ 08:03), Max: 37.4 (11-02-19 @ 15:00)  T(F): 98.9 (11-03-19 @ 08:03), Max: 99.3 (11-02-19 @ 15:00)  HR: 90 (11-03-19 @ 10:36) (87 - 111)  BP: 162/76 (11-03-19 @ 10:36) (129/87 - 170/98)  BP(mean): 101 (11-03-19 @ 03:00) (90 - 132)  RR: 19 (11-03-19 @ 08:03) (19 - 25)  SpO2: 90% (11-03-19 @ 08:03) (90% - 100%)    PHYSICAL EXAM:    General:  calm  HEENT:  left eye sowed shut, R pupil sluggishly reactive  Neuro:  Oriented x 1. Following commands.  RUE - 5/5. LUE 4/5 bicep flexion, 4+/5 tricep extension  RLE - 4+/5  LLE - 4/5  Cards:  s1, s2 RRR  Respiratory:  lungs clear b/l   Abdomen:  soft  Extremities:  no edema  Skin:  warm/dry    Drain: orbital drain in place        LABS:                          10.4   17.70 )-----------( 193      ( 03 Nov 2019 00:15 )             33.5    11-01    143  |  109<H>  |  10  ----------------------------<  203<H>  3.2<L>   |  24  |  0.74    Ca    7.9<L>      01 Nov 2019 23:47  Phos  2.5     11-01  Mg     1.9     11-01 11-02 @ 08:01  -  11-03 @ 07:00  --------------------------------------------------------  IN: 1975 mL / OUT: 650 mL / NET: 1325 mL        IMAGING:     MEDICATIONS:  Antibiotics:    Neuro:  acetaminophen   Tablet .. 650 milliGRAM(s) Oral every 6 hours PRN Temp greater or equal to 38C (100.4F), Mild Pain (1 - 3)  DULoxetine 60 milliGRAM(s) Oral daily  levETIRAcetam 750 milliGRAM(s) Oral two times a day  pregabalin 100 milliGRAM(s) Oral every 12 hours    Cardiac:  amLODIPine   Tablet 5 milliGRAM(s) Oral daily  doxazosin 2 milliGRAM(s) Oral at bedtime  metoprolol tartrate 25 milliGRAM(s) Oral two times a day    Pulm:    GI/:  pantoprazole  Injectable 40 milliGRAM(s) IV Push daily  polyethylene glycol 3350 17 Gram(s) Oral every 12 hours  senna 2 Tablet(s) Oral at bedtime    Other:   dexAMETHasone     Tablet 4 milliGRAM(s) Oral every 8 hours  dextrose 40% Gel 15 Gram(s) Oral once PRN Blood Glucose LESS THAN 70 milliGRAM(s)/deciliter  dextrose 5%. 1000 milliLiter(s) IV Continuous <Continuous>  dextrose 50% Injectable 12.5 Gram(s) IV Push once  dextrose 50% Injectable 25 Gram(s) IV Push once  enoxaparin Injectable 40 milliGRAM(s) SubCutaneous <User Schedule>  glucagon  Injectable 1 milliGRAM(s) IntraMuscular once PRN Glucose LESS THAN 70 milligrams/deciliter  insulin lispro (HumaLOG) corrective regimen sliding scale   SubCutaneous every 6 hours  insulin NPH human recombinant 10 Unit(s) SubCutaneous every 6 hours  levothyroxine 50 MICROGram(s) Oral daily  simvastatin 20 milliGRAM(s) Oral at bedtime        DIET:

## 2019-11-04 NOTE — CONSULT NOTE ADULT - SUBJECTIVE AND OBJECTIVE BOX
HPI:  77 y/o [place] female presents accompanied by daughter with H/O HTN, T2DM, Hyperlipidemia, Sinus/pituitary brain tumor s/p excision (1982), s/p fall ->subdural hematoma s/p craniotomy  (2013), seizure disorder,  poor gait and balance, dementia, C/O Skull base meningioma since 2010, it has gradually enlarged in size over the years, s/p left orbital enucleation (2018). The skull base meningioma has experienced significant growth and now involving the cavernous sinus and wrapping around supraclinoid segments of left ICA, expending to orbit and filling it's content from apex to anterior portion of the orbit. Patient was admitted and underwent 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 with Left eye closure on 10/31.  Course notable for vision hallucinations attributed to ICU/steroid-induced delirium.  CTH 11/1 with normal post op changes.   Venous doppler negative for DVT.      REVIEW OF SYSTEMS    All other review of systems negative    PAST MEDICAL & SURGICAL HISTORY  Benign neoplasm of meninges  History of seizure disorder  Irritable bowel syndrome with diarrhea  [place] (hard of hearing)  GERD (gastroesophageal reflux disease)  Frequent falls  Gait instability  Chronic depression  Legally blind  Sedentary lifestyle  Adrenal incidentaloma  Brain tumor  Sciatica  Arthritis  HLD (Hyperlipidemia)  Non-Insulin Dependent Diabetes Mellitus  Need for prophylactic measure  HTN (hypertension)  Brain tumor  History of seizure disorder  Craniotomy for resection of tumor of left side of brain  S/P myringotomy with insertion of tube  History of eye enucleation  Traumatic subdural hematoma  Pituitary Tumor  S/P Cholecystectomy  H/O: Hysterectomy  S/P Tubal Ligation        SOCIAL HISTORY  Smoking - Denied  EtOH - Denied   Drugs - Denied    FUNCTIONAL HISTORY  _______ hand dominant  Resides in private home, with 24 hour HHA, 6 steps to enter. PTA pt ambulatory short distances with cane, owned commode, shower chair, RW, cane.  HHA helped with ADLs    CURRENT FUNCTIONAL STATUS  Mod to Max Assist     FAMILY HISTORY   Reviewed and non-contributory    ALLERGIES  aspirin (Nausea; Other)  D.A. Chewable (Other)  erythromycin (Rash)  Naprosyn (Other)  Talwin (Unknown)  Talwin Compound (Urticaria; Rash)  Talwin Lactate (Other)    VITALS  T(C): 36.8 (11-04-19 @ 08:23)  T(F): 98.2 (11-04-19 @ 08:23), Max: 98.7 (11-04-19 @ 04:51)  HR: 78 (11-04-19 @ 08:23) (72 - 102)  BP: 163/82 (11-04-19 @ 08:23) (161/85 - 174/83)  RR:  (18 - 20)  SpO2:  (90% - 98%)  Wt(kg): --    PHYSICAL EXAM  Constitutional - NAD, Comfortable  HEENT - NCAT, EOMI  Neck - Supple  Chest - CTA bilaterally  Cardiovascular - RRR, S1S2  Abdomen - BS+, Soft, NTND  Extremities - No C/C/E, No calf tenderness   Neurologic Exam -                    Cognitive - Awake, Alert, Oriented to self, place, date, year, situation     Communication - Fluent, No dysarthria     Attention - able to recite days of week backwards     Memory - able to recall 3/3 items after 3 minutes     Cranial Nerves - CN 2-12 grossly intact     Motor -                     LEFT    UE - ShAB 5/5, EF 5/5, EE 5/5, WE 5/5,  5/5                    RIGHT UE - ShAB 5/5, EF 5/5, EE 5/5, WE 5/5,  5/5                    LEFT    LE - HF 5/5, KE 5/5, DF 5/5, PF 5/5                    RIGHT LE - HF 5/5, KE 5/5, DF 5/5, PF 5/5        Sensory - Intact to light touch diffusely     Reflexes - DTR intact and symmetrical, Negative Coleman's bilaterally, Negative Babinski's bilaterally      Coordination - Finger-to-nose intact bilaterally   Psychiatric - Affect WNL    RECENT LABS/IMAGING                        12.1   12.88 )-----------( 240      ( 04 Nov 2019 11:04 )             38.3     11-04    142  |  98  |  22  ----------------------------<  258<H>  4.3   |  30  |  0.70    Ca    8.9      04 Nov 2019 11:04      < from: CT Head No Cont (11.01.19 @ 08:47) >  IMPRESSION:    Markedly limited by patient motion.     Redemonstration of left pterional craniotomy. Evaluation of subjacent   extra axial collection is limited    Evaluation for acute intracranial hemorrhage or acute territorial infarct  is limited.    Slightly decreased rightward midline shift, currently 2 mm. No   hydrocephalus.    < end of copied text >    < from: [facility] Duplex Lower Ext Vein Scan, Bilat (11.01.19 @ 11:33) >    IMPRESSION:     No evidence of deep venous thrombosis in either lower extremity.    < end of copied text >      MEDICATIONS   MEDICATIONS  (STANDING):  amLODIPine   Tablet 5 milliGRAM(s) Oral daily  dexAMETHasone     Tablet 4 milliGRAM(s) Oral every 8 hours  dextrose 5%. 1000 milliLiter(s) (50 mL/Hr) IV Continuous <Continuous>  dextrose 50% Injectable 12.5 Gram(s) IV Push once  dextrose 50% Injectable 25 Gram(s) IV Push once  doxazosin 2 milliGRAM(s) Oral at bedtime  enoxaparin Injectable 40 milliGRAM(s) SubCutaneous <User Schedule>  insulin lispro (HumaLOG) corrective regimen sliding scale   SubCutaneous every 6 hours  insulin NPH human recombinant 10 Unit(s) SubCutaneous every 6 hours  levETIRAcetam 750 milliGRAM(s) Oral two times a day  levothyroxine 50 MICROGram(s) Oral daily  metoprolol tartrate 25 milliGRAM(s) Oral two times a day  pantoprazole  Injectable 40 milliGRAM(s) IV Push daily  polyethylene glycol 3350 17 Gram(s) Oral every 12 hours  pregabalin 100 milliGRAM(s) Oral every 12 hours  senna 2 Tablet(s) Oral at bedtime  simvastatin 20 milliGRAM(s) Oral at bedtime    MEDICATIONS  (PRN):  acetaminophen   Tablet .. 650 milliGRAM(s) Oral every 6 hours PRN Temp greater or equal to 38C (100.4F), Mild Pain (1 - 3)  dextrose 40% Gel 15 Gram(s) Oral once PRN Blood Glucose LESS THAN 70 milliGRAM(s)/deciliter  glucagon  Injectable 1 milliGRAM(s) IntraMuscular once PRN Glucose LESS THAN 70 milligrams/deciliter  traMADol 25 milliGRAM(s) Oral every 4 hours PRN Moderate Pain (4 - 6)      ASSESSMENT/PLAN  80 y/o F with functional, gait, ADL impairments s/p 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 on 10/31/19     Disposition - Recommend SUBACUTE REHAB to address current functional deficits.    PT - ROM, Bed mobility, Transfers, Ambulation with assistive device  OT - ADLs, ROM  SLP - Dysphagia eval and treat  Precautions - Falls, Cardiac  DVT Prophylaxis - Lovenox   Weight bearing status - WBAT   Skin - Turn Q2hrs  Diet - NPO with NGT feeds HPI:  77 y/o [place] female presents accompanied by daughter with H/O HTN, T2DM, Hyperlipidemia, Sinus/pituitary brain tumor s/p excision (1982), s/p fall ->subdural hematoma s/p craniotomy  (2013), seizure disorder,  poor gait and balance, dementia, C/O Skull base meningioma since 2010, it has gradually enlarged in size over the years, s/p left orbital enucleation (2018). The skull base meningioma has experienced significant growth and now involving the cavernous sinus and wrapping around supraclinoid segments of left ICA, expending to orbit and filling it's content from apex to anterior portion of the orbit. Patient was admitted and underwent 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 with Left eye closure on 10/31.  Course notable for vision hallucinations attributed to ICU/steroid-induced delirium.  CTH 11/1 with normal post op changes.   Venous doppler negative for DVT.      REVIEW OF SYSTEMS  headache, fatigue/sleepy   All other review of systems negative    PAST MEDICAL & SURGICAL HISTORY  Benign neoplasm of meninges  History of seizure disorder  Irritable bowel syndrome with diarrhea  [place] (hard of hearing)  GERD (gastroesophageal reflux disease)  Frequent falls  Gait instability  Chronic depression  Legally blind  Sedentary lifestyle  Adrenal incidentaloma  Brain tumor  Sciatica  Arthritis  HLD (Hyperlipidemia)  Non-Insulin Dependent Diabetes Mellitus  Need for prophylactic measure  HTN (hypertension)  Brain tumor  History of seizure disorder  Craniotomy for resection of tumor of left side of brain  S/P myringotomy with insertion of tube  History of eye enucleation  Traumatic subdural hematoma  Pituitary Tumor  S/P Cholecystectomy  H/O: Hysterectomy  S/P Tubal Ligation        SOCIAL HISTORY  Smoking - Denied  EtOH - Denied   Drugs - Denied    FUNCTIONAL HISTORY  Resides in private home, with 24 hour HHA, 6 steps to enter. PTA pt ambulatory short distances with cane, owned commode, shower chair, RW, cane.  HHA helped with ADLs    CURRENT FUNCTIONAL STATUS  Mod to Max Assist     FAMILY HISTORY   Reviewed and non-contributory    ALLERGIES  aspirin (Nausea; Other)  D.A. Chewable (Other)  erythromycin (Rash)  Naprosyn (Other)  Talwin (Unknown)  Talwin Compound (Urticaria; Rash)  Talwin Lactate (Other)    VITALS  T(C): 36.8 (11-04-19 @ 08:23)  T(F): 98.2 (11-04-19 @ 08:23), Max: 98.7 (11-04-19 @ 04:51)  HR: 78 (11-04-19 @ 08:23) (72 - 102)  BP: 163/82 (11-04-19 @ 08:23) (161/85 - 174/83)  RR:  (18 - 20)  SpO2:  (90% - 98%)  Wt(kg): --    PHYSICAL EXAM  Constitutional - NAD, Comfortable  HEENT - NCAT, EOMI  Neck - Supple  Chest - CTA bilaterally  Cardiovascular - RRR, S1S2  Abdomen - BS+, Soft, NTND  Extremities - No C/C/E, No calf tenderness   Neurologic Exam -                    Cognitive - somnolent, opens eyes to voice, oriented to self     Communication - slowed processing, one to two word responses      Cranial Nerves - left eye sewn shut, right EOMI with some tracking but closes eye 2/2 fatigue/low level of arousal      Motor - (limited 2/2 level of arousal/alertness)                     LEFT    UE - 3/5                    RIGHT UE - 3/5                     LEFT    LE - 1/5                     RIGHT LE - 2/5         Sensory - difficulty to assess given level of arousal      Reflexes - DTR intact and symmetrical   Psychiatric - Affect WNL    RECENT LABS/IMAGING                        12.1   12.88 )-----------( 240      ( 04 Nov 2019 11:04 )             38.3     11-04    142  |  98  |  22  ----------------------------<  258<H>  4.3   |  30  |  0.70    Ca    8.9      04 Nov 2019 11:04      < from: CT Head No Cont (11.01.19 @ 08:47) >  IMPRESSION:    Markedly limited by patient motion.     Redemonstration of left pterional craniotomy. Evaluation of subjacent   extra axial collection is limited    Evaluation for acute intracranial hemorrhage or acute territorial infarct  is limited.    Slightly decreased rightward midline shift, currently 2 mm. No   hydrocephalus.    < end of copied text >    < from: [facility] Duplex Lower Ext Vein Scan, Bilat (11.01.19 @ 11:33) >    IMPRESSION:     No evidence of deep venous thrombosis in either lower extremity.    < end of copied text >      MEDICATIONS   MEDICATIONS  (STANDING):  amLODIPine   Tablet 5 milliGRAM(s) Oral daily  dexAMETHasone     Tablet 4 milliGRAM(s) Oral every 8 hours  dextrose 5%. 1000 milliLiter(s) (50 mL/Hr) IV Continuous <Continuous>  dextrose 50% Injectable 12.5 Gram(s) IV Push once  dextrose 50% Injectable 25 Gram(s) IV Push once  doxazosin 2 milliGRAM(s) Oral at bedtime  enoxaparin Injectable 40 milliGRAM(s) SubCutaneous <User Schedule>  insulin lispro (HumaLOG) corrective regimen sliding scale   SubCutaneous every 6 hours  insulin NPH human recombinant 10 Unit(s) SubCutaneous every 6 hours  levETIRAcetam 750 milliGRAM(s) Oral two times a day  levothyroxine 50 MICROGram(s) Oral daily  metoprolol tartrate 25 milliGRAM(s) Oral two times a day  pantoprazole  Injectable 40 milliGRAM(s) IV Push daily  polyethylene glycol 3350 17 Gram(s) Oral every 12 hours  pregabalin 100 milliGRAM(s) Oral every 12 hours  senna 2 Tablet(s) Oral at bedtime  simvastatin 20 milliGRAM(s) Oral at bedtime    MEDICATIONS  (PRN):  acetaminophen   Tablet .. 650 milliGRAM(s) Oral every 6 hours PRN Temp greater or equal to 38C (100.4F), Mild Pain (1 - 3)  dextrose 40% Gel 15 Gram(s) Oral once PRN Blood Glucose LESS THAN 70 milliGRAM(s)/deciliter  glucagon  Injectable 1 milliGRAM(s) IntraMuscular once PRN Glucose LESS THAN 70 milligrams/deciliter  traMADol 25 milliGRAM(s) Oral every 4 hours PRN Moderate Pain (4 - 6)      ASSESSMENT/PLAN  80 y/o F with functional, gait, ADL impairments s/p 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 on 10/31/19     Disposition - Recommend SUBACUTE REHAB to address current functional deficits.    PT - ROM, Bed mobility, Transfers, Ambulation with assistive device  OT - ADLs, ROM  SLP - Dysphagia eval and treat  Precautions - Falls, Cardiac  DVT Prophylaxis - Lovenox   Weight bearing status - WBAT   Skin - Turn Q2hrs  Diet - NPO with NGT feeds

## 2019-11-04 NOTE — SWALLOW BEDSIDE ASSESSMENT ADULT - ORAL PREPARATORY PHASE
prolonged mastication, wincing noted with solids reduced participation on the rim of the cup with thin liquid

## 2019-11-04 NOTE — SWALLOW BEDSIDE ASSESSMENT ADULT - SWALLOW EVAL: RECOMMENDED DIET
Mechanical soft texture diet with thin liquid Mechanical soft texture diet with thin liquid with 100% assistance.

## 2019-11-04 NOTE — DIETITIAN INITIAL EVALUATION ADULT. - ADD RECOMMEND
Defer initiation of PO diet to medical team. If PO diet initiated, recommend Defer initiation of PO diet to medical team. If PO diet initiated, recommend Consistent Carbohydrate (with evening snack); defer texture/consistency to medical team/speech pathologist. Monitor need for oral nutrition supplements if pt not meeting estimated nutrient needs orally. Monitor tolerance to diet prescription, nutritional intake, weight trends, labs and skin integrity.

## 2019-11-04 NOTE — SWALLOW BEDSIDE ASSESSMENT ADULT - SLP GENERAL OBSERVATIONS
Pt encountered asleep in bed. Easily aroused to voice. Stable on room air, + left incision and drain. Ox1 (knows she is in [place] at a hospital but cannot state the name, stated month as August), easily distracted 2/2 reduced attention, able to follow simple commands for evalution purposes with repetition and a visual model. No dysarthria.

## 2019-11-04 NOTE — SWALLOW BEDSIDE ASSESSMENT ADULT - SWALLOW EVAL: RECOMMENDED FEEDING/EATING TECHNIQUES
maintain upright posture during/after eating for 30 mins/Pace slowly./small sips/bites/position upright (90 degrees)/crush medication (when feasible)

## 2019-11-04 NOTE — SWALLOW BEDSIDE ASSESSMENT ADULT - SWALLOW EVAL: PATIENT/FAMILY GOALS STATEMENT
Pt with h/o dysphagia following SAH in 2013 requiring thickened liquids. Per pt's daughter, need for thickened beverages was short-lived and pt was upgraded to thin liquid in the rehab setting.

## 2019-11-04 NOTE — DIETITIAN INITIAL EVALUATION ADULT. - FACTORS AFF FOOD INTAKE
Pt currently with NGT in place and ordered for tube feeds since 11/1. Currently denies nausea/vomiting, diarrhea/constipation or other acute GI distress. Per RN, last BM today. Per orders, pt ordered for speech/swallow evaluation at this time.

## 2019-11-04 NOTE — PROGRESS NOTE ADULT - SUBJECTIVE AND OBJECTIVE BOX
ENT ISSUE/POD:  S/P Left  orbital craniotomy for meningioma with left eye closure POD # 4    HPI: 77 YO F with PMH skull base meningioma since 2010, it has gradually enlarged in size over the years, s/p left orbital enucleation (2018). The skull base meningioma has experienced significant growth and now involving the cavernous sinus and wrapping around supraclinoid segments of left ICA, expending to orbit and filling it's content from apex to anterior portion of the orbit. Now S/P 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 with Left eye closure POD #4. Mild BRB from drain incision overnight per daughter, controlled w gauze and pressure        PAST MEDICAL & SURGICAL HISTORY:  History of seizure disorder  Irritable bowel syndrome with diarrhea  [place] (hard of hearing)  GERD (gastroesophageal reflux disease)  Frequent falls  Gait instability  Chronic depression  Legally blind  Sedentary lifestyle  Adrenal incidentaloma  Brain tumor: sellar mass.  hx of L eye blidness/proptosis from a ?tumor  Sciatica: both hips  Arthritis: has pain in hips and back  HLD (Hyperlipidemia)  Non-Insulin Dependent Diabetes Mellitus: diagnosed in 1992  Hypertension  S/P myringotomy with insertion of tube: 2019  History of eye enucleation: 2018 s/p Left orbital enucleation  Traumatic subdural hematoma: s/p evacuation in 2013  Pituitary Tumor: excision in 1992  S/P Cholecystectomy: open in 1979  H/O: Hysterectomy: abdominal with right oophorectomy in 1982  S/P Tubal Ligation: in 1978    Allergies    aspirin (Nausea; Other)  D.A. Chewable (Other)  erythromycin (Rash)  Naprosyn (Other)  Talwin (Unknown)  Talwin Compound (Urticaria; Rash)  Talwin Lactate (Other)    Intolerances      MEDICATIONS  (STANDING):  amLODIPine   Tablet 5 milliGRAM(s) Oral daily  dexAMETHasone     Tablet 4 milliGRAM(s) Oral every 8 hours  dextrose 5%. 1000 milliLiter(s) (50 mL/Hr) IV Continuous <Continuous>  dextrose 50% Injectable 12.5 Gram(s) IV Push once  dextrose 50% Injectable 25 Gram(s) IV Push once  doxazosin 2 milliGRAM(s) Oral at bedtime  enoxaparin Injectable 40 milliGRAM(s) SubCutaneous <User Schedule>  insulin lispro (HumaLOG) corrective regimen sliding scale   SubCutaneous every 6 hours  insulin NPH human recombinant 10 Unit(s) SubCutaneous every 6 hours  levETIRAcetam 750 milliGRAM(s) Oral two times a day  levothyroxine 50 MICROGram(s) Oral daily  metoprolol tartrate 25 milliGRAM(s) Oral two times a day  pantoprazole  Injectable 40 milliGRAM(s) IV Push daily  polyethylene glycol 3350 17 Gram(s) Oral every 12 hours  pregabalin 100 milliGRAM(s) Oral every 12 hours  senna 2 Tablet(s) Oral at bedtime  simvastatin 20 milliGRAM(s) Oral at bedtime    MEDICATIONS  (PRN):  acetaminophen   Tablet .. 650 milliGRAM(s) Oral every 6 hours PRN Temp greater or equal to 38C (100.4F), Mild Pain (1 - 3)  dextrose 40% Gel 15 Gram(s) Oral once PRN Blood Glucose LESS THAN 70 milliGRAM(s)/deciliter  glucagon  Injectable 1 milliGRAM(s) IntraMuscular once PRN Glucose LESS THAN 70 milligrams/deciliter  traMADol 25 milliGRAM(s) Oral every 4 hours PRN Moderate Pain (4 - 6)      ROS:   ENT: all negative except as noted in HPI   Pulm: denies SOB, cough, hemoptysis  Neuro: denies numbness/tingling, loss of sensation  Endo: denies heat/cold intolerance, excessive sweating      Vital Signs Last 24 Hrs  T(C): 36.8 (04 Nov 2019 08:23), Max: 37.1 (04 Nov 2019 04:51)  T(F): 98.2 (04 Nov 2019 08:23), Max: 98.7 (04 Nov 2019 04:51)  HR: 78 (04 Nov 2019 08:23) (72 - 99)  BP: 163/82 (04 Nov 2019 08:23) (161/85 - 174/83)  BP(mean): --  RR: 18 (04 Nov 2019 08:23) (18 - 20)  SpO2: 98% (04 Nov 2019 08:23) (93% - 98%)                          12.1   12.88 )-----------( 240      ( 04 Nov 2019 11:04 )             38.3    11-04    142  |  98  |  22  ----------------------------<  258<H>  4.3   |  30  |  0.70    Ca    8.9      04 Nov 2019 11:04     SUSAN output: 0/0/1    PHYSICAL EXAM:  Gen: NAD.  Skin: No rashes, bruises, or lesions.  Head: NC/AT.  Face: erythema, or fluctuance. Parotid glands soft without mass.  Eyes: Left eye closure with sutures in place, mild periorbital swelling, site c/d/i. SUSAN x1 in place, draining serosanguinous output.   Nose: Nares bilaterally patent, no discharge.  Mouth: No Stridor / Drooling / Trismus.  Mucosa moist, tongue/uvula midline, oropharynx clear.  Neck: Flat, supple, no lymphadenopathy, trachea midline, no masses.  Lymphatic: No lymphadenopathy.  Resp: breathing easily, no stridor.  Neuro: facial nerve intact ENT ISSUE/POD:  S/P Left  orbital craniotomy for meningioma with left eye closure POD # 4    HPI: 77 YO F with PMH skull base meningioma since 2010, it has gradually enlarged in size over the years, s/p left orbital enucleation (2018). The skull base meningioma has experienced significant growth and now involving the cavernous sinus and wrapping around supraclinoid segments of left ICA, expending to orbit and filling it's content from apex to anterior portion of the orbit. Now S/P 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 with Left eye closure POD #4. Mild BRB from drain incision overnight per daughter, controlled w gauze and pressure        PAST MEDICAL & SURGICAL HISTORY:  History of seizure disorder  Irritable bowel syndrome with diarrhea  [place] (hard of hearing)  GERD (gastroesophageal reflux disease)  Frequent falls  Gait instability  Chronic depression  Legally blind  Sedentary lifestyle  Adrenal incidentaloma  Brain tumor: sellar mass.  hx of L eye blidness/proptosis from a ?tumor  Sciatica: both hips  Arthritis: has pain in hips and back  HLD (Hyperlipidemia)  Non-Insulin Dependent Diabetes Mellitus: diagnosed in 1992  Hypertension  S/P myringotomy with insertion of tube: 2019  History of eye enucleation: 2018 s/p Left orbital enucleation  Traumatic subdural hematoma: s/p evacuation in 2013  Pituitary Tumor: excision in 1992  S/P Cholecystectomy: open in 1979  H/O: Hysterectomy: abdominal with right oophorectomy in 1982  S/P Tubal Ligation: in 1978    Allergies    aspirin (Nausea; Other)  D.A. Chewable (Other)  erythromycin (Rash)  Naprosyn (Other)  Talwin (Unknown)  Talwin Compound (Urticaria; Rash)  Talwin Lactate (Other)    Intolerances      MEDICATIONS  (STANDING):  amLODIPine   Tablet 5 milliGRAM(s) Oral daily  dexAMETHasone     Tablet 4 milliGRAM(s) Oral every 8 hours  dextrose 5%. 1000 milliLiter(s) (50 mL/Hr) IV Continuous <Continuous>  dextrose 50% Injectable 12.5 Gram(s) IV Push once  dextrose 50% Injectable 25 Gram(s) IV Push once  doxazosin 2 milliGRAM(s) Oral at bedtime  enoxaparin Injectable 40 milliGRAM(s) SubCutaneous <User Schedule>  insulin lispro (HumaLOG) corrective regimen sliding scale   SubCutaneous every 6 hours  insulin NPH human recombinant 10 Unit(s) SubCutaneous every 6 hours  levETIRAcetam 750 milliGRAM(s) Oral two times a day  levothyroxine 50 MICROGram(s) Oral daily  metoprolol tartrate 25 milliGRAM(s) Oral two times a day  pantoprazole  Injectable 40 milliGRAM(s) IV Push daily  polyethylene glycol 3350 17 Gram(s) Oral every 12 hours  pregabalin 100 milliGRAM(s) Oral every 12 hours  senna 2 Tablet(s) Oral at bedtime  simvastatin 20 milliGRAM(s) Oral at bedtime    MEDICATIONS  (PRN):  acetaminophen   Tablet .. 650 milliGRAM(s) Oral every 6 hours PRN Temp greater or equal to 38C (100.4F), Mild Pain (1 - 3)  dextrose 40% Gel 15 Gram(s) Oral once PRN Blood Glucose LESS THAN 70 milliGRAM(s)/deciliter  glucagon  Injectable 1 milliGRAM(s) IntraMuscular once PRN Glucose LESS THAN 70 milligrams/deciliter  traMADol 25 milliGRAM(s) Oral every 4 hours PRN Moderate Pain (4 - 6)      ROS:   ENT: all negative except as noted in HPI   Pulm: denies SOB, cough, hemoptysis  Neuro: denies numbness/tingling, loss of sensation  Endo: denies heat/cold intolerance, excessive sweating      Vital Signs Last 24 Hrs  T(C): 36.8 (04 Nov 2019 08:23), Max: 37.1 (04 Nov 2019 04:51)  T(F): 98.2 (04 Nov 2019 08:23), Max: 98.7 (04 Nov 2019 04:51)  HR: 78 (04 Nov 2019 08:23) (72 - 99)  BP: 163/82 (04 Nov 2019 08:23) (161/85 - 174/83)  BP(mean): --  RR: 18 (04 Nov 2019 08:23) (18 - 20)  SpO2: 98% (04 Nov 2019 08:23) (93% - 98%)                          12.1   12.88 )-----------( 240      ( 04 Nov 2019 11:04 )             38.3    11-04    142  |  98  |  22  ----------------------------<  258<H>  4.3   |  30  |  0.70    Ca    8.9      04 Nov 2019 11:04     SUSAN output: 0/0/1    PHYSICAL EXAM:  Gen: NAD.  Skin: No rashes, bruises, or lesions.  Head: NC/AT.  Face: erythema, or fluctuance. Parotid glands soft without mass.  Eyes: Left eye closure with sutures in place, mild periorbital swelling, site c/d/i. SUSAN removed, pressure held over incision site and gauze dressing placed Nose: Nares bilaterally patent, no discharge.  Mouth: No Stridor / Drooling / Trismus.  Mucosa moist, tongue/uvula midline, oropharynx clear.  Neck: Flat, supple, no lymphadenopathy, trachea midline, no masses.  Lymphatic: No lymphadenopathy.  Resp: breathing easily, no stridor.  Neuro: facial nerve intact

## 2019-11-04 NOTE — DIETITIAN INITIAL EVALUATION ADULT. - OTHER INFO
DIET HISTORY PTA: Daughter reports pt with good appetite and intake. Per diet recall, pt avoids sugar-sweetened foods/beverages and monitors portion sizes of carbohydrates. PTA pt checks finger sticks once daily; typical fasting ~120mg/dL. Per chart/report, takes Lantus and Metformin PTA. Recent HgbA1c of 5.9% (11/1). NKFA. PTA micronutrient supplementation includes vitamin D2.     WEIGHT HISTORY: Reports UBW of 185-190 pounds. Daughter endorses pt with gradual weight gain of ~10 pounds over the last 2 years; however, reports weight has been stable for at least 6 months. Current dosing weight of 184 pounds is relatively consistent with reported UBW; differences may be related to scale discrepancy.    RD observed EN of Glucerna 1.5 running at goal of 65cc/hr which provides 1560 cc formula, 2340 kcal, 128 gm protein 1184 cc free water (meeting 28kcal/kg, 1.5 gm/kg protein using dosing weight 83.5 kg). RD briefly reviewed T2DM nutrition therapy with pt's daughter as pt rested. Discussed continuing to limit sugar sweetened foods/beverages, creating balanced meals using MyPlate model, importance of nighttime snack while taking long-acting insulin to prevent morning hypoglycemia, and hypoglycemia treatment protocol using 15-15 rule. Written handouts offered and accepted.     Skin per chart: surgical incisions to head s/p left craniotomy and to eye s/p tarsorrhaphy. Edema per chart: 3+ periorbital    Ht: 61 inches, Wt: 83.5 kg (10/31), BMI: 34.8 kg/m2, IBW: 105 pounds (+/-10%), %IBW: 175% DIET HISTORY PTA: Daughter reports pt with good appetite and intake. Per diet recall, pt avoids sugar-sweetened foods/beverages and monitors portion sizes of carbohydrates. PTA pt checks finger sticks once daily; typical fasting ~120mg/dL. Per chart/report, takes Lantus and Metformin PTA. Recent HgbA1c of 5.9% (11/1). NKFA. PTA micronutrient supplementation includes vitamin D2.     WEIGHT HISTORY: Reports UBW of 185-190 pounds. Daughter endorses pt with gradual weight gain of ~10 pounds over the last 2 years; however, reports weight has been stable for at least 6 months. Current dosing weight of 184 pounds is relatively consistent with reported UBW; differences may be related to scale discrepancy.    RD observed EN of Glucerna 1.5 running at goal of 65cc/hr which provides 1560 cc formula, 2340 kcal, 128 gm protein 1184 cc free water (meeting 28kcal/kg, 1.5 gm/kg protein using dosing weight 83.5 kg). Per pt/daughter/RN, pt tolerating at goal rate since 11/2 without issue. RD briefly reviewed T2DM nutrition therapy with pt's daughter as pt rested. Discussed continuing to limit sugar sweetened foods/beverages, creating balanced meals using MyPlate model, importance of nighttime snack while taking long-acting insulin to prevent morning hypoglycemia, and hypoglycemia treatment protocol using 15-15 rule. Written handouts offered and accepted.     Skin per chart: surgical incisions to head s/p left craniotomy and to eye s/p tarsorrhaphy. Edema per chart: 3+ periorbital    Ht: 61 inches, Wt: 83.5 kg (10/31), BMI: 34.8 kg/m2, IBW: 105 pounds (+/-10%), %IBW: 175%

## 2019-11-04 NOTE — SWALLOW BEDSIDE ASSESSMENT ADULT - SLP PERTINENT HISTORY OF CURRENT PROBLEM
79 y/o [place] female presents accompanied by daughter with H/O HTN, T2DM, Hyperlipidemia, Sinus/pituitary brain tumor s/p excision (1982), s/p fall ->subdural hematoma s/p craniotomy  (2013), seizure disorder,  poor gait and balance, dementia, C/O Skull base meningioma since 2010, it has gradually enlarged in size over the years, s/p left orbital enucleation (2018). The skull base meningioma has experienced significant growth and now involving the cavernous sinus and wrapping around supraclinoid segments of left ICA, expending to orbit and filling it's content from apex to anterior portion of the orbit. Patient was admitted and underwent 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 with Left eye closure on 10/31.  Course notable for vision hallucinations attributed to ICU/steroid-induced delirium. CTH 11/1 with normal post op changes. Doppler negative for DVT.

## 2019-11-04 NOTE — PROGRESS NOTE ADULT - PROBLEM SELECTOR PLAN 1
S/P  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 with Left eye closure POD #4.   - Monitor SUSAN output plan for removal later today  - Pain meds prn.  - ENT will follow.   - Call with questions. S/P  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 with Left eye closure POD #4.   - Pain meds prn.  - ENT will follow.   - Call with questions.

## 2019-11-04 NOTE — SWALLOW BEDSIDE ASSESSMENT ADULT - SWALLOW EVAL: DIAGNOSIS
Oropharyngeal swallow sequence appears WFL to tolerate a regular texture diet with no overt s/s of laryngeal penetration or aspiration. However, given location of incision, pt with pain during mastication. Therefore would recommend mechanical soft texture diet with subjective upgrade to regular solids, as tolerated. This service will continue to follow.

## 2019-11-04 NOTE — DIETITIAN INITIAL EVALUATION ADULT. - ENTERAL
In setting of PO synthroid administration, recommend change to 18hr feeds to maximize medication absorption. Recommend Glucerna 1.5 at goal rate of 70cc/hr x18hrs to provide 1260cc formula, 1890kcal, 104gm protein (meeting 22kcal/kg, 1.24 gm/kg protein using dosing wt 83.5 kg). In setting of PO synthroid administration, recommend change to 18hr feeds to maximize medication absorption. Recommend Glucerna 1.5 at goal rate of 70cc/hr x18hrs to provide 1260cc formula, 1890kcal, 104gm protein (meeting 22kcal/kg, 1.24 gm/kg protein using dosing wt 83.5 kg). Of note, pt currently on NPH insulin.

## 2019-11-04 NOTE — DIETITIAN INITIAL EVALUATION ADULT. - REASON INDICATOR FOR ASSESSMENT
Seen for: length of stay on [facility]  Source: pt, daughter, RN, EMR    Per chart, pt is a 79 year old female with PMH of HTN, HLD, T2DM, sinus/pituitary brain tumor s/p excision (1982), s/p fall resulting in SDH s/p craniotomy (2013), seizure disorder, dementia, skull base meningioma, presents to PST for scheduled procedure. S/p 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 on 10/31/19.

## 2019-11-04 NOTE — PROGRESS NOTE ADULT - ASSESSMENT
79 YO F with  Skull base meningioma since 2010, it has gradually enlarged in size over the years, s/p left orbital enucleation (2018). Now S/P  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 with Left eye closure POD #4. SUSAN in place reported output of 1cc for entire day 11/3. No hematoma 77 YO F with  Skull base meningioma since 2010, it has gradually enlarged in size over the years, s/p left orbital enucleation (2018). Now S/P  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 with Left eye closure POD #4. SUSAN drain removed

## 2019-11-04 NOTE — PROGRESS NOTE ADULT - ASSESSMENT
77 y/o Citizen of [place] female H/O HTN, T2DM, Hyperlipidemia, Sinus/pituitary brain tumor s/p excision (1982), s/p fall ->subdural hematoma s/p craniotomy  (2013), seizure disorder,  poor gait and balance, dementia, C/O Skull base meningioma since 2010, it has gradually enlarged in size over the years, s/p left orbital enucleation (2018). The skull base meningioma has experienced significant growth and now involving the cavernous sinus and wrapping around supraclinoid segments of left ICA, expending to orbit and filling it's content from apex to anterior portion of the orbit. Now scheduled for 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 . 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 The skull base meningioma has experienced significant growth and now involving the cavernous sinus and wrapping around supraclinoid segments of left ICA, expending to orbit and filling it's content from apex to anterior portion of the orbit. Now scheduled for 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 . 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 MRI brain post op ordered. Left SUSAN orbital to be reomed by ENT today. Follow up pathology. Continue Keppra   Vitals stable- On Norvasc and low dose Metoprolol   Type 2 DM- change NPH to Lantus 13units and premeal Humalog 4 units. may need further tapering as steroids being tapered   PT/OT- JACOB   Dysphagia- Started on Soft mechanical diet   ENT f/u appreciated   DVT ppx

## 2019-11-04 NOTE — PROGRESS NOTE ADULT - SUBJECTIVE AND OBJECTIVE BOX
SUBJECTIVE:   No complaints Left eye sutured shut . Daughter at bedside   OVERNIGHT EVENTS: none    Vital Signs Last 24 Hrs  T(C): 36.7 (04 Nov 2019 12:37), Max: 37.1 (04 Nov 2019 04:51)  T(F): 98.1 (04 Nov 2019 12:37), Max: 98.7 (04 Nov 2019 04:51)  HR: 71 (04 Nov 2019 12:37) (71 - 99)  BP: 174/83 (04 Nov 2019 12:37) (161/85 - 174/83)  BP(mean): --  RR: 18 (04 Nov 2019 12:37) (18 - 20)  SpO2: 92% (04 Nov 2019 12:37) (92% - 98%)    PHYSICAL EXAM:    Constitutional: No Acute Distress     Neurological: Awake alert Ox 2  Speech clear Following Commands, Moving all Extremities RUE 4/5 LUE 4+/5 b/l HG 4/5 b/l LE proximal 3/5 distal 4/5 R pupil sluggish     Pulmonary: Clear to Auscultation,    Cardiovascular: S1, S2, Regular rate and rhythm     Gastrointestinal: Soft, Non-tender, Non-distended     Extremities: No calf tenderness     LABS:                        12.1   12.88 )-----------( 240      ( 04 Nov 2019 11:04 )             38.3    11-04    142  |  98  |  22  ----------------------------<  258<H>  4.3   |  30  |  0.70    Ca    8.9      04 Nov 2019 11:04      IMAGING:         MEDICATIONS:    acetaminophen   Tablet .. 650 milliGRAM(s) Oral every 6 hours PRN Temp greater or equal to 38C (100.4F), Mild Pain (1 - 3)  levETIRAcetam 750 milliGRAM(s) Oral two times a day  pregabalin 100 milliGRAM(s) Oral every 12 hours  traMADol 25 milliGRAM(s) Oral every 4 hours PRN Moderate Pain (4 - 6)  amLODIPine   Tablet 5 milliGRAM(s) Oral daily  doxazosin 2 milliGRAM(s) Oral at bedtime  metoprolol tartrate 25 milliGRAM(s) Oral two times a day  pantoprazole  Injectable 40 milliGRAM(s) IV Push daily  polyethylene glycol 3350 17 Gram(s) Oral every 12 hours  senna 2 Tablet(s) Oral at bedtime  dexAMETHasone     Tablet 4 milliGRAM(s) Oral every 8 hours  enoxaparin Injectable 40 milliGRAM(s) SubCutaneous <User Schedule>  glucagon  Injectable 1 milliGRAM(s) IntraMuscular once PRN Glucose LESS THAN 70 milligrams/deciliter  insulin lispro (HumaLOG) corrective regimen sliding scale   SubCutaneous every 6 hours  insulin NPH human recombinant 10 Unit(s) SubCutaneous every 6 hours  levothyroxine 50 MICROGram(s) Oral daily  simvastatin 20 milliGRAM(s) Oral at bedtime      DIET:     Assessment:  Please Check When Present   []  GCS  E   V  M     Heart Failure: []Acute, [] acute on chronic , []chronic  Heart Failure:  [] Diastolic (HFpEF), [] Systolic (HFrEF), []Combined (HFpEF and HFrEF), [] RHF, [] Pulm HTN, [] Other    [] LUZ MARIA, [] ATN, [] AIN, [] other  [] CKD1, [] CKD2, [] CKD 3, [] CKD 4, [] CKD 5, []ESRD    Encephalopathy: [] Metabolic, [] Hepatic, [] toxic, [] Neurological, [] Other    Abnormal Nurtitional Status: [] malnurtition (see nutrition note), [ ]underweight: BMI < 19, [] morbid obesity: BMI >40, [] Cachexia    [] Sepsis  [] hypovolemic shock,[] cardiogenic shock, [] hemorrhagic shock, [] neuogenic shock  [] Acute Respiratory Failure  []Cerebral edema, [] Brain compression/ herniation,   [] Functional quadriplegia  [] Acute blood loss anemia

## 2019-11-05 NOTE — CONSULT NOTE ADULT - SUBJECTIVE AND OBJECTIVE BOX
CHIEF COMPLAINT:Patient is a 79y old  Female who presents with a chief complaint of Skull base meningioma (17 Oct 2019 11:39)      HISTORY OF PRESENT ILLNESS:    this is a pleasant 77 y/o lady with H/O HTN, T2DM, Hyperlipidemia, Sinus/pituitary brain tumor s/p excision (1982), s/p fall ->subdural hematoma s/p craniotomy  (2013), seizure disorder,  poor gait and balance, dementia, C/O Skull base meningioma since 2010, it has gradually enlarged in size over the years, s/p left orbital enucleation (2018). The skull base meningioma has experienced significant growth and now involving the cavernous sinus and wrapping around supraclinoid segments of left ICA, expending to orbit and filling it's content from apex to anterior portion of the orbit. Now scheduled for 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 . 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     PAST MEDICAL & SURGICAL HISTORY:  History of seizure disorder  Irritable bowel syndrome with diarrhea  [place] (hard of hearing)  GERD (gastroesophageal reflux disease)  Frequent falls  Gait instability  Chronic depression  Legally blind  Sedentary lifestyle  Adrenal incidentaloma  Brain tumor: sellar mass.  hx of L eye blidness/proptosis from a ?tumor  Sciatica: both hips  Arthritis: has pain in hips and back  HLD (Hyperlipidemia)  Non-Insulin Dependent Diabetes Mellitus: diagnosed in 1992  Hypertension  S/P myringotomy with insertion of tube: 2019  History of eye enucleation: 2018 s/p Left orbital enucleation  Traumatic subdural hematoma: s/p evacuation in 2013  Pituitary Tumor: excision in 1992  S/P Cholecystectomy: open in 1979  H/O: Hysterectomy: abdominal with right oophorectomy in 1982  S/P Tubal Ligation: in 1978          MEDICATIONS:  amLODIPine   Tablet 5 milliGRAM(s) Oral daily  doxazosin 2 milliGRAM(s) Oral at bedtime  enoxaparin Injectable 40 milliGRAM(s) SubCutaneous <User Schedule>  hydrALAZINE 25 milliGRAM(s) Oral every 8 hours  metoprolol tartrate 25 milliGRAM(s) Oral two times a day        acetaminophen   Tablet .. 650 milliGRAM(s) Oral every 6 hours PRN  levETIRAcetam 750 milliGRAM(s) Oral two times a day  pregabalin 100 milliGRAM(s) Oral every 12 hours  traMADol 25 milliGRAM(s) Oral every 4 hours PRN    pantoprazole    Tablet 40 milliGRAM(s) Oral before breakfast  polyethylene glycol 3350 17 Gram(s) Oral every 12 hours  senna 2 Tablet(s) Oral at bedtime    dexAMETHasone     Tablet 2 milliGRAM(s) Oral every 8 hours  dextrose 40% Gel 15 Gram(s) Oral once PRN  dextrose 50% Injectable 12.5 Gram(s) IV Push once  dextrose 50% Injectable 25 Gram(s) IV Push once  glucagon  Injectable 1 milliGRAM(s) IntraMuscular once PRN  insulin glargine Injectable (LANTUS) 13 Unit(s) SubCutaneous at bedtime  insulin lispro (HumaLOG) corrective regimen sliding scale   SubCutaneous three times a day before meals  insulin lispro (HumaLOG) corrective regimen sliding scale   SubCutaneous at bedtime  insulin lispro Injectable (HumaLOG) 4 Unit(s) SubCutaneous three times a day with meals  levothyroxine 50 MICROGram(s) Oral daily  simvastatin 20 milliGRAM(s) Oral at bedtime    dextrose 5%. 1000 milliLiter(s) IV Continuous <Continuous>      FAMILY HISTORY:      Non-contributory    SOCIAL HISTORY:    No tobacco, drugs or etoh    Allergies    aspirin (Nausea; Other)  D.A. Chewable (Other)  erythromycin (Rash)  Naprosyn (Other)  Talwin (Unknown)  Talwin Compound (Urticaria; Rash)  Talwin Lactate (Other)    Intolerances    	    REVIEW OF SYSTEMS:  as above  The rest of the 14 points ROS reviewed and except above they are unremarkable.        PHYSICAL EXAM:  T(C): 36.8 (11-05-19 @ 04:30), Max: 36.9 (11-04-19 @ 16:28)  HR: 72 (11-05-19 @ 04:30) (71 - 86)  BP: 172/83 (11-05-19 @ 04:30) (154/84 - 178/86)  RR: 18 (11-05-19 @ 04:30) (14 - 18)  SpO2: 93% (11-05-19 @ 04:30) (92% - 98%)  Wt(kg): --  I&O's Summary    04 Nov 2019 07:01  -  05 Nov 2019 07:00  --------------------------------------------------------  IN: 880 mL / OUT: 1000 mL / NET: -120 mL        Appearance: Normal	  HEENT:   no gross abnormality   Cardiovascular: Normal S1 S2,    Murmur:   Neck: JVP normal  Respiratory: Lungs clear   Gastrointestinal:  Soft, Non-tender  Skin: normal   Neuro: No gross deficits.   Psychiatry:  Mood & affect flat  Ext: No edema    LABS/DATA:    TELEMETRY: 	    ECG:  	   	  CARDIAC MARKERS:      < from: Transthoracic Echocardiogram (11.01.17 @ 15:29) >  effusion.  ------------------------------------------------------------------------  CONCLUSIONS:  1. Mitral annular calcification, otherwise normal mitral  valve. Minimal mitral regurgitation.  2. Normal left ventricular internal dimensions and wall  thicknesses.  3. Endocardium not well visualized; grossly normal left  ventricular systolic function.  4. The right ventricle is not well visualized; grossly  normal right ventricular systolic function.  ------------------------------------------------------------------------  Confirmed on  11/1/2017 - 16:43:46 by Martinez Cruz M.D.    < end of copied text >                                  12.1   12.88 )-----------( 240      ( 04 Nov 2019 11:04 )             38.3     11-04    142  |  98  |  22  ----------------------------<  258<H>  4.3   |  30  |  0.70    Ca    8.9      04 Nov 2019 11:04      proBNP:   Lipid Profile:   HgA1c:   TSH:

## 2019-11-05 NOTE — PROGRESS NOTE ADULT - ASSESSMENT
77 YO F with  Skull base meningioma since 2010, it has gradually enlarged in size over the years, s/p left orbital enucleation (2018). Now S/P  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 with Left eye closure POD #5. 77 YO F with  Skull base meningioma since 2010, it has gradually enlarged in size over the years, s/p left orbital enucleation (2018). Now S/P  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 with Left eye closure POD #5. After speaking with Dr. Olivia, since pt. had previous history of radiation treatment, sutures of left eye need to remain for 10-14 days.  Pt. will need to see Dr. Sellers in the office the week of November 11th for suture removal. After discharge from rehab, pt. will need follow up  by Dr. Olivia.

## 2019-11-05 NOTE — PROGRESS NOTE ADULT - SUBJECTIVE AND OBJECTIVE BOX
NT ISSUE/POD:  S/P Left  orbital craniotomy for meningioma with left eye closure POD # 5    HPI: 79 YO F with PMH skull base meningioma since 2010, it has gradually enlarged in size over the years, s/p left orbital enucleation (2018). The skull base meningioma has experienced significant growth and now involving the cavernous sinus and wrapping around supraclinoid segments of left ICA, expending to orbit and filling it's content from apex to anterior portion of the orbit. Now S/P 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 with Left eye closure POD #5. drain removed 11/4. no issues overnight.     PAST MEDICAL & SURGICAL HISTORY:  History of seizure disorder  Irritable bowel syndrome with diarrhea  [place] (hard of hearing)  GERD (gastroesophageal reflux disease)  Frequent falls  Gait instability  Chronic depression  Legally blind  Sedentary lifestyle  Adrenal incidentaloma  Brain tumor: sellar mass.  hx of L eye blidness/proptosis from a ?tumor  Sciatica: both hips  Arthritis: has pain in hips and back  HLD (Hyperlipidemia)  Non-Insulin Dependent Diabetes Mellitus: diagnosed in 1992  Hypertension  S/P myringotomy with insertion of tube: 2019  History of eye enucleation: 2018 s/p Left orbital enucleation  Traumatic subdural hematoma: s/p evacuation in 2013  Pituitary Tumor: excision in 1992  S/P Cholecystectomy: open in 1979  H/O: Hysterectomy: abdominal with right oophorectomy in 1982  S/P Tubal Ligation: in 1978    Allergies    aspirin (Nausea; Other)  D.A. Chewable (Other)  erythromycin (Rash)  Naprosyn (Other)  Talwin (Unknown)  Talwin Compound (Urticaria; Rash)  Talwin Lactate (Other)    Intolerances      MEDICATIONS  (STANDING):  atorvastatin 20 milliGRAM(s) Oral at bedtime  dexAMETHasone     Tablet 2 milliGRAM(s) Oral every 8 hours  dextrose 5%. 1000 milliLiter(s) (50 mL/Hr) IV Continuous <Continuous>  dextrose 50% Injectable 12.5 Gram(s) IV Push once  dextrose 50% Injectable 25 Gram(s) IV Push once  doxazosin 2 milliGRAM(s) Oral at bedtime  enoxaparin Injectable 40 milliGRAM(s) SubCutaneous <User Schedule>  hydrALAZINE 25 milliGRAM(s) Oral every 8 hours  insulin glargine Injectable (LANTUS) 13 Unit(s) SubCutaneous at bedtime  insulin lispro (HumaLOG) corrective regimen sliding scale   SubCutaneous three times a day before meals  insulin lispro (HumaLOG) corrective regimen sliding scale   SubCutaneous at bedtime  insulin lispro Injectable (HumaLOG) 4 Unit(s) SubCutaneous three times a day with meals  levETIRAcetam 750 milliGRAM(s) Oral two times a day  levothyroxine 50 MICROGram(s) Oral daily  metoprolol tartrate 25 milliGRAM(s) Oral two times a day  NIFEdipine XL 60 milliGRAM(s) Oral daily  pantoprazole    Tablet 40 milliGRAM(s) Oral before breakfast  polyethylene glycol 3350 17 Gram(s) Oral every 12 hours  pregabalin 100 milliGRAM(s) Oral every 12 hours  senna 2 Tablet(s) Oral at bedtime    MEDICATIONS  (PRN):  acetaminophen   Tablet .. 650 milliGRAM(s) Oral every 6 hours PRN Temp greater or equal to 38C (100.4F), Mild Pain (1 - 3)  dextrose 40% Gel 15 Gram(s) Oral once PRN Blood Glucose LESS THAN 70 milliGRAM(s)/deciliter  glucagon  Injectable 1 milliGRAM(s) IntraMuscular once PRN Glucose LESS THAN 70 milligrams/deciliter  traMADol 25 milliGRAM(s) Oral every 4 hours PRN Moderate Pain (4 - 6)      social history: see consult     family history: see consult   ROS:   ENT: all negative except as noted in HPI   Pulm: denies SOB, cough, hemoptysis  Neuro: denies numbness/tingling, loss of sensation  Endo: denies heat/cold intolerance, excessive sweating      Vital Signs Last 24 Hrs  T(C): 36.7 (05 Nov 2019 08:16), Max: 36.9 (04 Nov 2019 16:28)  T(F): 98 (05 Nov 2019 08:16), Max: 98.4 (04 Nov 2019 16:28)  HR: 63 (05 Nov 2019 08:16) (63 - 86)  BP: 171/99 (05 Nov 2019 08:16) (154/84 - 178/86)  BP(mean): --  RR: 18 (05 Nov 2019 08:16) (14 - 18)  SpO2: 94% (05 Nov 2019 08:16) (92% - 94%)                          12.1   12.88 )-----------( 240      ( 04 Nov 2019 11:04 )             38.3    11-04    142  |  98  |  22  ----------------------------<  258<H>  4.3   |  30  |  0.70    Ca    8.9      04 Nov 2019 11:04       PHYSICAL EXAM:  Gen: NAD.  Skin: No rashes, bruises, or lesions.  Head: NC  Face: erythema, or fluctuance. Parotid glands soft without mass.  Eyes: Left eye closure with sutures in place, mild periorbital swelling, site c/d/i.   Nose: Nares bilaterally patent, no discharge.  Mouth: No Stridor / Drooling / Trismus.  Mucosa moist, tongue/uvula midline, oropharynx clear.  Neck: Flat, supple, no lymphadenopathy, trachea midline, no masses.  Lymphatic: No lymphadenopathy.  Resp: breathing easily, no stridor.  Neuro: facial nerve intact

## 2019-11-05 NOTE — PROGRESS NOTE ADULT - PROBLEM SELECTOR PLAN 1
- Pain meds prn.  - ENT will follow.   - Call with questions. - Pain meds prn.  - ENT will follow.   - Call with questions  -F/U with Dr. Sellers in office week of November 11th for removal of sutures around eye  - F/U with Dr. Olivia when discharged from rehab

## 2019-11-05 NOTE — CONSULT NOTE ADULT - ASSESSMENT
HTN  dc norvasc   add nifedipine as ordered     HLD  change zocor to lipitor given interaction with [facility]    DM  Monitor finger stick. Insulin coverage. Diabetic education and Diabetic diet. Consider nutrition consultation.    pt can follow up with me in office

## 2019-11-05 NOTE — CONSULT NOTE ADULT - ATTENDING COMMENTS
Advanced care planning was discussed with patient and family.  Risks, benefits and alternatives of the cardiac treatments and medical therapy including procedures were discussed in detail and all questions were answered. Importance of compliance with medical therapy and lifestyle modification to improve cardiovascular health were addressed. Appropriate forms and patient educational materials were reviewed. 30 minutes face to face spent.
Seen and examined with resident. Agree with note.   Patient will need subacute rehabilitation when stable.

## 2019-11-05 NOTE — PROGRESS NOTE ADULT - ASSESSMENT
79 y/o [place] female H/O HTN, T2DM, Hyperlipidemia, Sinus/pituitary brain tumor s/p excision (1982), s/p fall ->subdural hematoma s/p craniotomy  (2013), seizure disorder,  poor gait and balance, dementia, C/O Skull base meningioma since 2010, it has gradually enlarged in size over the years, s/p left orbital enucleation (2018). The skull base meningioma has experienced significant growth and now involving the cavernous sinus and wrapping around supraclinoid segments of left ICA, expending to orbit and filling it's content from apex to anterior portion of the orbit. Now scheduled for 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 . s/p Left pterional craniotomy/ partial resection of a sphenoorbital/cavernous sinus meningioma 10/31/19 pod #5       Plan    Neuro stable-Decadron taper to 2mg q8. MR brain results reviewed by Dr Culp .  Follow up pathology. Continue Keppra   Vitals stable- On Norvasc and low dose Metoprolol   Type 2 DM- FSBS <200 On  Lantus 13units and premeal Humalog 4 units.  Dysphagia- tolerating Soft mechanical diet   ENT f/u appreciated   DVT ppx   PT/OT- JACOB . Auth pending. Possible d/c in am 79 y/o [place] female H/O HTN, T2DM, Hyperlipidemia, Sinus/pituitary brain tumor s/p excision (1982), s/p fall ->subdural hematoma s/p craniotomy  (2013), seizure disorder,  poor gait and balance, dementia, C/O Skull base meningioma since 2010, it has gradually enlarged in size over the years, s/p left orbital enucleation (2018). The skull base meningioma has experienced significant growth and now involving the cavernous sinus and wrapping around supraclinoid segments of left ICA, expending to orbit and filling it's content from apex to anterior portion of the orbit. Now scheduled for 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 . s/p Left pterional craniotomy/ partial resection of a sphenoorbital/cavernous sinus meningioma 10/31/19 pod #5       Plan    Neuro stable-Decadron taper to 2mg q8. MR brain results reviewed by Dr Culp .  Follow up pathology. Continue Keppra   HTN-  d/c norvasc and started on Nifedipine 60 . Continue  low dose Metoprolol . Dr Landaverde consult appreciated   Type 2 DM- FSBS <200 On  Lantus 13units and premeal Humalog 4 units.  Dysphagia- tolerating Soft mechanical diet   ENT f/u appreciated   DVT ppx   PT/OT- JACOB . Auth pending. Possible d/c in am

## 2019-11-05 NOTE — PROGRESS NOTE ADULT - SUBJECTIVE AND OBJECTIVE BOX
SUBJECTIVE:   OOB to chair. Doing well   OVERNIGHT EVENTS: none    Vital Signs Last 24 Hrs  T(C): 37 (2019 13:59), Max: 37 (2019 13:59)  T(F): 98.6 (2019 13:59), Max: 98.6 (2019 13:59)  HR: 79 (2019 13:59) (63 - 86)  BP: 148/85 (2019 13:59) (148/85 - 178/86)  RR: 18 (2019 13:59) (14 - 18)  SpO2: 98% (2019 13:59) (92% - 98%)    PHYSICAL EXAM:    Constitutional: No Acute Distress     Neurological: Awake alert Ox 2  Speech clear Following Commands, Moving all Extremities RUE 4/5 LUE 4+/5 b/l HG 4/5 b/l LE proximal 3/5 distal 4/5  R pupil sluggish     Pulmonary: Clear to Auscultation,    Cardiovascular: S1, S2, Regular rate and rhythm     Gastrointestinal: Soft, Non-tender, Non-distended     Extremities: No calf tenderness     LABS:                        12.1   12.88 )-----------( 240      ( 2019 11:04 )             38.3    11-04    142  |  98  |  22  ----------------------------<  258<H>  4.3   |  30  |  0.70    Ca    8.9      2019 11:04        IMAGIN/4/ 19 MR brain Trace subdural hemorrhage along the left parietal and occipital   convexities. Interval left-sided pterional craniotomy and partial   resection/debulking of a previously noted extensive infiltrating   meningioma    MEDICATIONS:    acetaminophen   Tablet .. 650 milliGRAM(s) Oral every 6 hours PRN Temp greater or equal to 38C (100.4F), Mild Pain (1 - 3)  levETIRAcetam 750 milliGRAM(s) Oral two times a day  pregabalin 100 milliGRAM(s) Oral every 12 hours  traMADol 25 milliGRAM(s) Oral every 4 hours PRN Moderate Pain (4 - 6)  doxazosin 2 milliGRAM(s) Oral at bedtime  hydrALAZINE 25 milliGRAM(s) Oral every 8 hours  metoprolol tartrate 25 milliGRAM(s) Oral two times a day  NIFEdipine XL 60 milliGRAM(s) Oral daily  loperamide 2 milliGRAM(s) Oral daily PRN Diarrhea  pantoprazole    Tablet 40 milliGRAM(s) Oral before breakfast  polyethylene glycol 3350 17 Gram(s) Oral every 12 hours  senna 2 Tablet(s) Oral at bedtime  atorvastatin 20 milliGRAM(s) Oral at bedtime  dexAMETHasone     Tablet 2 milliGRAM(s) Oral every 8 hours  enoxaparin Injectable 40 milliGRAM(s) SubCutaneous <User Schedule>  glucagon  Injectable 1 milliGRAM(s) IntraMuscular once PRN Glucose LESS THAN 70 milligrams/deciliter  insulin glargine Injectable (LANTUS) 13 Unit(s) SubCutaneous at bedtime  insulin lispro (HumaLOG) corrective regimen sliding scale   SubCutaneous three times a day before meals  insulin lispro (HumaLOG) corrective regimen sliding scale   SubCutaneous at bedtime  insulin lispro Injectable (HumaLOG) 4 Unit(s) SubCutaneous three times a day with meals  levothyroxine 50 MICROGram(s) Oral daily      DIET:     Assessment:  Please Check When Present   []  GCS  E   V  M     Heart Failure: []Acute, [] acute on chronic , []chronic  Heart Failure:  [] Diastolic (HFpEF), [] Systolic (HFrEF), []Combined (HFpEF and HFrEF), [] RHF, [] Pulm HTN, [] Other    [] LUZ MARIA, [] ATN, [] AIN, [] other  [] CKD1, [] CKD2, [] CKD 3, [] CKD 4, [] CKD 5, []ESRD    Encephalopathy: [] Metabolic, [] Hepatic, [] toxic, [] Neurological, [] Other    Abnormal Nurtitional Status: [] malnurtition (see nutrition note), [ ]underweight: BMI < 19, [] morbid obesity: BMI >40, [] Cachexia    [] Sepsis  [] hypovolemic shock,[] cardiogenic shock, [] hemorrhagic shock, [] neuogenic shock  [] Acute Respiratory Failure  []Cerebral edema, [] Brain compression/ herniation,   [] Functional quadriplegia  [] Acute blood loss anemia SUBJECTIVE:   OOB to chair. Doing well   OVERNIGHT EVENTS: none    Vital Signs Last 24 Hrs  T(C): 37 (2019 13:59), Max: 37 (2019 13:59)  T(F): 98.6 (2019 13:59), Max: 98.6 (2019 13:59)  HR: 79 (2019 13:59) (63 - 86)  BP: 148/85 (2019 13:59) (148/85 - 178/86)  RR: 18 (2019 13:59) (14 - 18)  SpO2: 98% (2019 13:59) (92% - 98%)    PHYSICAL EXAM:    Constitutional: No Acute Distress     Neurological: Awake alert Ox 2  Speech clear Following Commands, Moving all Extremities RUE 4/5 LUE 4+/5 b/l HG 4/5 b/l LE proximal 3/5 distal 4/5  R pupil sluggish . Left frontal sutures C/D/I Left eyelid sutures+    Pulmonary: Clear to Auscultation,    Cardiovascular: S1, S2, Regular rate and rhythm     Gastrointestinal: Soft, Non-tender, Non-distended     Extremities: No calf tenderness     LABS:                        12.1   12.88 )-----------( 240      ( 2019 11:04 )             38.3    11-04    142  |  98  |  22  ----------------------------<  258<H>  4.3   |  30  |  0.70    Ca    8.9      2019 11:04        IMAGIN/4/ 19 MR brain Trace subdural hemorrhage along the left parietal and occipital   convexities. Interval left-sided pterional craniotomy and partial   resection/debulking of a previously noted extensive infiltrating   meningioma    MEDICATIONS:    acetaminophen   Tablet .. 650 milliGRAM(s) Oral every 6 hours PRN Temp greater or equal to 38C (100.4F), Mild Pain (1 - 3)  levETIRAcetam 750 milliGRAM(s) Oral two times a day  pregabalin 100 milliGRAM(s) Oral every 12 hours  traMADol 25 milliGRAM(s) Oral every 4 hours PRN Moderate Pain (4 - 6)  doxazosin 2 milliGRAM(s) Oral at bedtime  hydrALAZINE 25 milliGRAM(s) Oral every 8 hours  metoprolol tartrate 25 milliGRAM(s) Oral two times a day  NIFEdipine XL 60 milliGRAM(s) Oral daily  loperamide 2 milliGRAM(s) Oral daily PRN Diarrhea  pantoprazole    Tablet 40 milliGRAM(s) Oral before breakfast  polyethylene glycol 3350 17 Gram(s) Oral every 12 hours  senna 2 Tablet(s) Oral at bedtime  atorvastatin 20 milliGRAM(s) Oral at bedtime  dexAMETHasone     Tablet 2 milliGRAM(s) Oral every 8 hours  enoxaparin Injectable 40 milliGRAM(s) SubCutaneous <User Schedule>  glucagon  Injectable 1 milliGRAM(s) IntraMuscular once PRN Glucose LESS THAN 70 milligrams/deciliter  insulin glargine Injectable (LANTUS) 13 Unit(s) SubCutaneous at bedtime  insulin lispro (HumaLOG) corrective regimen sliding scale   SubCutaneous three times a day before meals  insulin lispro (HumaLOG) corrective regimen sliding scale   SubCutaneous at bedtime  insulin lispro Injectable (HumaLOG) 4 Unit(s) SubCutaneous three times a day with meals  levothyroxine 50 MICROGram(s) Oral daily      DIET:     Assessment:  Please Check When Present   []  GCS  E   V  M     Heart Failure: []Acute, [] acute on chronic , []chronic  Heart Failure:  [] Diastolic (HFpEF), [] Systolic (HFrEF), []Combined (HFpEF and HFrEF), [] RHF, [] Pulm HTN, [] Other    [] LUZ MARIA, [] ATN, [] AIN, [] other  [] CKD1, [] CKD2, [] CKD 3, [] CKD 4, [] CKD 5, []ESRD    Encephalopathy: [] Metabolic, [] Hepatic, [] toxic, [] Neurological, [] Other    Abnormal Nurtitional Status: [] malnurtition (see nutrition note), [ ]underweight: BMI < 19, [] morbid obesity: BMI >40, [] Cachexia    [] Sepsis  [] hypovolemic shock,[] cardiogenic shock, [] hemorrhagic shock, [] neuogenic shock  [] Acute Respiratory Failure  []Cerebral edema, [] Brain compression/ herniation,   [] Functional quadriplegia  [] Acute blood loss anemia

## 2019-11-06 NOTE — DISCHARGE NOTE PROVIDER - NSDCFUSCHEDAPPT_GEN_ALL_CORE_FT
LAURENCE KEY ; 12/13/2019 ; NPP Rad  [facility]  LAURENCE KEY ; 12/13/2019 ; NPP Urology [address]  LAURENCE KEY ; 12/24/2019 ; NPP Geriatrics [address] [facility]  LAURENCE KEY ; 12/31/2019 ; NPP Neuro [address] [facility]  LAURENCE KEY ; 01/06/2020 ; NPP Med Endocr [address] [facility]  LAURENCE KEY ; 01/21/2020 ; NPP OtoLaryng [address] [facility]

## 2019-11-06 NOTE — DISCHARGE NOTE PROVIDER - NSDCMRMEDTOKEN_GEN_ALL_CORE_FT
acetaminophen 325 mg oral tablet: 2 tab(s) orally every 6 hours, As needed, Mild Pain (1 - 3)  dexamethasone 2 mg oral tablet: 1 tab(s) orally every 12 hours  doxazosin 2 mg oral tablet: 1 tab(s) orally once a day (at bedtime)  DULoxetine 60 mg oral delayed release capsule: 1 cap(s) orally once a day  enoxaparin: 40 milligram(s) subcutaneously once a day (at bedtime)  HumaLOG KwikPen 200 units/mL (Concentrated) subcutaneous solution: low dose humalog insulin sliding scale  Lantus Solostar Pen 100 units/mL subcutaneous solution: 13 unit(s) subcutaneous once a day  levETIRAcetam 750 mg oral tablet: 1 tab(s) orally 2 times a day  levothyroxine 50 mcg (0.05 mg) oral tablet: 1 tab(s) orally once a day  loperamide 2 mg oral capsule: 1 cap(s) orally every 4 hours, As Needed  metoprolol tartrate 25 mg oral tablet: 1 tab(s) orally 2 times a day  NIFEdipine 60 mg oral tablet, extended release: 1 tab(s) orally once a day  pantoprazole 40 mg oral granule, delayed release: 1 each orally once a day  polyethylene glycol 3350 oral powder for reconstitution: 17 gram(s) orally every 12 hours  pregabalin 100 mg oral capsule: 1 cap(s) orally 2 times a day  senna oral tablet: 2 tab(s) orally once a day (at bedtime)  simvastatin 20 mg oral tablet: 1 tab(s) orally once a day (at bedtime)  traMADol 50 mg oral tablet: 0.5 tab(s) orally every 4 hours, As needed, Moderate Pain (4 - 6)

## 2019-11-06 NOTE — DISCHARGE NOTE PROVIDER - NSDCCPTREATMENT_GEN_ALL_CORE_FT
PRINCIPAL PROCEDURE  Procedure: Craniotomy for resection of tumor of left side of brain  Findings and Treatment:

## 2019-11-06 NOTE — DISCHARGE NOTE NURSING/CASE MANAGEMENT/SOCIAL WORK - NSDCPELOVENOX_GEN_ALL_CORE
Enoxaparin/Lovenox - Compliance/Enoxaparin/Lovenox - Potential for adverse drug reactions and interactions/Enoxaparin/Lovenox - Dietary Advice/Enoxaparin/Lovenox - Follow up monitoring

## 2019-11-06 NOTE — DISCHARGE NOTE NURSING/CASE MANAGEMENT/SOCIAL WORK - NSDCPELOVENOXFU_GEN_ALL_CORE
Go for blood tests as directed. Your doctor will do lab tests at regular visits to monitor the effects of this medicine. Please follow up with your doctor and keep your health care provider appointments

## 2019-11-06 NOTE — DISCHARGE NOTE PROVIDER - HOSPITAL COURSE
Patient is a 78 year old [place] citizen female with a past medical history of HTN, T2DM, Hyperlipidemia, Sinus/pituitary brain tumor s/p excision (1982), s/p fall resulting in subdural hematoma s/p craniotomy (2013), seizure disorder,  poor gait and balance, dementia, Skull base meningioma since 2010 which has gradually enlarged in size over the years, s/p left orbital enucleation (2018). The skull base meningioma has grown significantly and now involves the cavernous sinus and wraps around supraclinoid segments of left ICA, expanding to orbit and filling it's content from apex to anterior portion of the orbit. She was admitted through same day admitting on 10/31/19 and underwent left pterional craniotomy for partial resection of spheno-orbital/cavernous sinus meningioma with ENT closure of left orbital incision. Left eye sutures to be removed by ENT team at [facility] rehab 10-14 days post-op. Post operative CT head revealed expected post operative changes. Post operative MRI brain revealed expected residual and stable post operative change.s Hospital course notable for hypertension for which cardiology was consulted and readjusted antihypertensive medications. Blood pressure improved on current regimen Nifedipine and Metoprolol. Hospital course also notable for steroid induced hyperglycemia which improved with tapering of steroids. She was evaluated by physical therapy who recommended subacute rehab upon discharge. on the day of discharge, patient is medically and neurosurgically stable and cleared for discharge.

## 2019-11-06 NOTE — DISCHARGE NOTE NURSING/CASE MANAGEMENT/SOCIAL WORK - PATIENT PORTAL LINK FT
You can access the FollowMyHealth Patient Portal offered by [facility] by registering at the following website: [email or web address]. By joining [facility]’s FollowMyHealth portal, you will also be able to view your health information using other applications (apps) compatible with our system.

## 2019-11-06 NOTE — PROGRESS NOTE ADULT - PROVIDER SPECIALTY LIST ADULT
Cardiology
ENT
NSICU
Neurosurgery
NSICU

## 2019-11-06 NOTE — PROGRESS NOTE ADULT - SUBJECTIVE AND OBJECTIVE BOX
ENT ISSUE/POD: S/P Left orbital craniotomy for meningioma with left eye closure POD # 6    HPI:  79 YO F with PMH skull base meningioma since 2010, it has gradually enlarged in size over the years, s/p left orbital enucleation (2018). The skull base meningioma has experienced significant growth and now involving the cavernous sinus and wrapping around supraclinoid segments of left ICA, expending to orbit and filling it's content from apex to anterior portion of the orbit. Now S/P 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 with Left eye closure POD #6. drain removed 11/4. No reported issues        PAST MEDICAL & SURGICAL HISTORY:  History of seizure disorder  Irritable bowel syndrome with diarrhea  [place]  GERD (gastroesophageal reflux disease)  Frequent falls  Gait instability  Chronic depression  Legally blind  Sedentary lifestyle  Adrenal incidentaloma  Brain tumor: sellar mass.  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 Chewable (Other)  erythromycin (Rash)  Naprosyn (Other)  Talwin (Unknown)  Talwin Compound (Urticaria; Rash)  Talwin Lactate (Other)    Intolerances      MEDICATIONS  (STANDING):  atorvastatin 20 milliGRAM(s) Oral at bedtime  dexAMETHasone     Tablet 2 milliGRAM(s) Oral every 12 hours  dextrose 5%. 1000 milliLiter(s) (50 mL/Hr) IV Continuous <Continuous>  dextrose 50% Injectable 12.5 Gram(s) IV Push once  dextrose 50% Injectable 25 Gram(s) IV Push once  doxazosin 2 milliGRAM(s) Oral at bedtime  enoxaparin Injectable 40 milliGRAM(s) SubCutaneous <User Schedule>  insulin glargine Injectable (LANTUS) 13 Unit(s) SubCutaneous at bedtime  insulin lispro (HumaLOG) corrective regimen sliding scale   SubCutaneous three times a day before meals  insulin lispro (HumaLOG) corrective regimen sliding scale   SubCutaneous at bedtime  insulin lispro Injectable (HumaLOG) 4 Unit(s) SubCutaneous three times a day with meals  levETIRAcetam 750 milliGRAM(s) Oral two times a day  levothyroxine 50 MICROGram(s) Oral daily  metoprolol tartrate 25 milliGRAM(s) Oral two times a day  NIFEdipine XL 60 milliGRAM(s) Oral daily  pantoprazole    Tablet 40 milliGRAM(s) Oral before breakfast  polyethylene glycol 3350 17 Gram(s) Oral every 12 hours  pregabalin 100 milliGRAM(s) Oral every 12 hours  senna 2 Tablet(s) Oral at bedtime    MEDICATIONS  (PRN):  acetaminophen   Tablet .. 650 milliGRAM(s) Oral every 6 hours PRN Temp greater or equal to 38C (100.4F), Mild Pain (1 - 3)  dextrose 40% Gel 15 Gram(s) Oral once PRN Blood Glucose LESS THAN 70 milliGRAM(s)/deciliter  glucagon  Injectable 1 milliGRAM(s) IntraMuscular once PRN Glucose LESS THAN 70 milligrams/deciliter  loperamide 2 milliGRAM(s) Oral daily PRN Diarrhea  traMADol 25 milliGRAM(s) Oral every 4 hours PRN Moderate Pain (4 - 6)        ROS:   ENT: all negative except as noted in HPI   Pulm: denies SOB, cough, hemoptysis  Neuro: denies numbness/tingling, loss of sensation  Endo: denies heat/cold intolerance, excessive sweating      Vital Signs Last 24 Hrs  T(C): 36.6 (06 Nov 2019 04:40), Max: 37 (05 Nov 2019 13:59)  T(F): 97.8 (06 Nov 2019 04:40), Max: 98.6 (05 Nov 2019 13:59)  HR: 72 (06 Nov 2019 04:40) (69 - 88)  BP: 126/78 (06 Nov 2019 04:40) (126/78 - 162/82)  BP(mean): --  RR: 18 (06 Nov 2019 04:40) (18 - 18)  SpO2: 94% (06 Nov 2019 04:40) (92% - 98%)                          12.1   12.88 )-----------( 240      ( 04 Nov 2019 11:04 )             38.3    11-04    142  |  98  |  22  ----------------------------<  258<H>  4.3   |  30  |  0.70    Ca    8.9      04 Nov 2019 11:04         PHYSICAL EXAM:  Gen: NAD.  Skin: No rashes, bruises, or lesions.  Head: Normocephalic, atraumatic  Face: erythema, or fluctuance. Parotid glands soft without mass.  Eyes: Left eye closure with sutures in place, mild periorbital swelling, site c/d/i, L temporal incision post drain removal well-healing, no bleeding, dry gauze dressing  Nose: Nares bilaterally patent, no discharge.  Mouth: No Stridor / Drooling / Trismus.  Mucosa moist, tongue/uvula midline, oropharynx clear.  Neck: Flat, supple, no lymphadenopathy, trachea midline, no masses.  Lymphatic: No lymphadenopathy.  Resp: breathing easily, no stridor.  Neuro: facial nerve intact ENT ISSUE/POD: S/P Left orbital craniotomy for meningioma with left eye closure POD # 6    HPI:  79 YO F with PMH skull base meningioma since 2010, it has gradually enlarged in size over the years, s/p left orbital enucleation (2018). The skull base meningioma has experienced significant growth and now involving the cavernous sinus and wrapping around supraclinoid segments of left ICA, expending to orbit and filling it's content from apex to anterior portion of the orbit. Now S/P 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 with Left eye closure POD #6. drain removed 11/4. No reported issues        PAST MEDICAL & SURGICAL HISTORY:  History of seizure disorder  Irritable bowel syndrome with diarrhea  [place] (hard of hearing[place]  GERD (gastroesophageal reflux disease)  Frequent falls  Gait instability  Chronic depression  Legally blind  Sedentary lifestyle  Adrenal incidentaloma  Brain tumor: sellar mass.  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 Chewable (Other)  erythromycin (Rash)  Naprosyn (Other)  Talwin (Unknown)  Talwin Compound (Urticaria; Rash)  Talwin Lactate (Other)    Intolerances      MEDICATIONS  (STANDING):  atorvastatin 20 milliGRAM(s) Oral at bedtime  dexAMETHasone     Tablet 2 milliGRAM(s) Oral every 12 hours  dextrose 5%. 1000 milliLiter(s) (50 mL/Hr) IV Continuous <Continuous>  dextrose 50% Injectable 12.5 Gram(s) IV Push once  dextrose 50% Injectable 25 Gram(s) IV Push once  doxazosin 2 milliGRAM(s) Oral at bedtime  enoxaparin Injectable 40 milliGRAM(s) SubCutaneous <User Schedule>  insulin glargine Injectable (LANTUS) 13 Unit(s) SubCutaneous at bedtime  insulin lispro (HumaLOG) corrective regimen sliding scale   SubCutaneous three times a day before meals  insulin lispro (HumaLOG) corrective regimen sliding scale   SubCutaneous at bedtime  insulin lispro Injectable (HumaLOG) 4 Unit(s) SubCutaneous three times a day with meals  levETIRAcetam 750 milliGRAM(s) Oral two times a day  levothyroxine 50 MICROGram(s) Oral daily  metoprolol tartrate 25 milliGRAM(s) Oral two times a day  NIFEdipine XL 60 milliGRAM(s) Oral daily  pantoprazole    Tablet 40 milliGRAM(s) Oral before breakfast  polyethylene glycol 3350 17 Gram(s) Oral every 12 hours  pregabalin 100 milliGRAM(s) Oral every 12 hours  senna 2 Tablet(s) Oral at bedtime    MEDICATIONS  (PRN):  acetaminophen   Tablet .. 650 milliGRAM(s) Oral every 6 hours PRN Temp greater or equal to 38C (100.4F), Mild Pain (1 - 3)  dextrose 40% Gel 15 Gram(s) Oral once PRN Blood Glucose LESS THAN 70 milliGRAM(s)/deciliter  glucagon  Injectable 1 milliGRAM(s) IntraMuscular once PRN Glucose LESS THAN 70 milligrams/deciliter  loperamide 2 milliGRAM(s) Oral daily PRN Diarrhea  traMADol 25 milliGRAM(s) Oral every 4 hours PRN Moderate Pain (4 - 6)        ROS:   ENT: all negative except as noted in HPI   Pulm: denies SOB, cough, hemoptysis  Neuro: denies numbness/tingling, loss of sensation  Endo: denies heat/cold intolerance, excessive sweating      Vital Signs Last 24 Hrs  T(C): 36.6 (06 Nov 2019 04:40), Max: 37 (05 Nov 2019 13:59)  T(F): 97.8 (06 Nov 2019 04:40), Max: 98.6 (05 Nov 2019 13:59)  HR: 72 (06 Nov 2019 04:40) (69 - 88)  BP: 126/78 (06 Nov 2019 04:40) (126/78 - 162/82)  BP(mean): --  RR: 18 (06 Nov 2019 04:40) (18 - 18)  SpO2: 94% (06 Nov 2019 04:40) (92% - 98%)                          12.1   12.88 )-----------( 240      ( 04 Nov 2019 11:04 )             38.3    11-04    142  |  98  |  22  ----------------------------<  258<H>  4.3   |  30  |  0.70    Ca    8.9      04 Nov 2019 11:04         PHYSICAL EXAM:  Gen: NAD.  Skin: No rashes, bruises, or lesions.  Head: Normocephalic, atraumatic  Face: erythema, or fluctuance. Parotid glands soft without mass.  Eyes: Left eye closure with sutures in place, mild periorbital swelling, site c/d/i, L temporal incision post drain removal well-healing, no bleeding, dry gauze dressing - removed healing well, baci placed   Nose: Nares bilaterally patent, no discharge.  Mouth: No Stridor / Drooling / Trismus.  Mucosa moist, tongue/uvula midline, oropharynx clear.  Neck: Flat, supple, no lymphadenopathy, trachea midline, no masses.  Lymphatic: No lymphadenopathy.  Resp: breathing easily, no stridor.  Neuro: facial nerve intact

## 2019-11-06 NOTE — PROGRESS NOTE ADULT - SUBJECTIVE AND OBJECTIVE BOX
Subjective: Patient seen and examined. No new events except as noted.     SUBJECTIVE/ROS:  No chest pain, dyspnea, palpitation, or dizziness.       MEDICATIONS:  MEDICATIONS  (STANDING):  atorvastatin 20 milliGRAM(s) Oral at bedtime  dexAMETHasone     Tablet 2 milliGRAM(s) Oral every 12 hours  dextrose 5%. 1000 milliLiter(s) (50 mL/Hr) IV Continuous <Continuous>  dextrose 50% Injectable 12.5 Gram(s) IV Push once  dextrose 50% Injectable 25 Gram(s) IV Push once  doxazosin 2 milliGRAM(s) Oral at bedtime  enoxaparin Injectable 40 milliGRAM(s) SubCutaneous <User Schedule>  insulin glargine Injectable (LANTUS) 13 Unit(s) SubCutaneous at bedtime  insulin lispro (HumaLOG) corrective regimen sliding scale   SubCutaneous three times a day before meals  insulin lispro (HumaLOG) corrective regimen sliding scale   SubCutaneous at bedtime  insulin lispro Injectable (HumaLOG) 4 Unit(s) SubCutaneous three times a day with meals  levETIRAcetam 750 milliGRAM(s) Oral two times a day  levothyroxine 50 MICROGram(s) Oral daily  metoprolol tartrate 25 milliGRAM(s) Oral two times a day  NIFEdipine XL 60 milliGRAM(s) Oral daily  pantoprazole    Tablet 40 milliGRAM(s) Oral before breakfast  polyethylene glycol 3350 17 Gram(s) Oral every 12 hours  pregabalin 100 milliGRAM(s) Oral every 12 hours  senna 2 Tablet(s) Oral at bedtime      PHYSICAL EXAM:  T(C): 36.8 (11-06-19 @ 10:37), Max: 37 (11-05-19 @ 13:59)  HR: 72 (11-06-19 @ 10:37) (69 - 88)  BP: 130/83 (11-06-19 @ 10:37) (126/78 - 156/75)  RR: 18 (11-06-19 @ 10:37) (18 - 18)  SpO2: 94% (11-06-19 @ 10:37) (92% - 98%)  Wt(kg): --  I&O's Summary    05 Nov 2019 07:01  -  06 Nov 2019 07:00  --------------------------------------------------------  IN: 720 mL / OUT: 1602 mL / NET: -882 mL            JVP: Normal  Neck: supple  Lung: clear   CV: S1 S2 , Murmur:  Abd: soft  Ext: No edema  neuro: Awake / alert  Psych: flat affect  Skin: normal``    LABS/DATA:    CARDIAC MARKERS:                                12.1   12.88 )-----------( 240      ( 04 Nov 2019 11:04 )             38.3     11-04    142  |  98  |  22  ----------------------------<  258<H>  4.3   |  30  |  0.70    Ca    8.9      04 Nov 2019 11:04      proBNP:   Lipid Profile:   HgA1c:   TSH:     TELE:  EKG:

## 2019-11-06 NOTE — PROGRESS NOTE ADULT - SUBJECTIVE AND OBJECTIVE BOX
SUBJECTIVE: Patient seen and examined at bedside. No acute overnight events. +bowel movement yesterday. Denies chest pain, shorntess of breath, nausea/vomiting.     Vital Signs Last 24 Hrs  T(C): 36.8 (19 @ 10:37), Max: 37 (19 @ 13:59)  T(F): 98.3 (19 @ 10:37), Max: 98.6 (19 @ 13:59)  HR: 72 (19 @ 10:37) (69 - 88)  BP: 130/83 (19 @ 10:37) (126/78 - 156/75)  RR: 18 (19 @ 10:37) (18 - 18)  SpO2: 94% (19 @ 10:37) (92% - 98%)      PHYSICAL EXAM:    Constitutional: No Acute Distress, resting comfortably in bed     Neurological: Awake alert, oriented to self and hospital (states the year is ), Speech clear Following Commands, Moving all Extremities RUE 4/5 LUE 4+/5 b/l HG 4/5 b/l LE proximal 4-/5 distal 4/5  R pupil 4mm sluggish .     Incision: Left frontal sutures C/D/I; Left eyelid sutures C/D/I    Pulmonary: Clear to Auscultation,    Cardiovascular: S1, S2, Regular rate and rhythm     Gastrointestinal: Soft, Non-tender, Non-distended     Extremities: No calf tenderness     LABS: no new labs                  IMAGIN/4/ 19 MR brain Trace subdural hemorrhage along the left parietal and occipital   convexities. Interval left-sided pterional craniotomy and partial   resection/debulking of a previously noted extensive infiltrating   meningioma      MEDICATIONS  (STANDING):  atorvastatin 20 milliGRAM(s) Oral at bedtime  dexAMETHasone     Tablet 2 milliGRAM(s) Oral every 12 hours  dextrose 5%. 1000 milliLiter(s) (50 mL/Hr) IV Continuous <Continuous>  dextrose 50% Injectable 12.5 Gram(s) IV Push once  dextrose 50% Injectable 25 Gram(s) IV Push once  doxazosin 2 milliGRAM(s) Oral at bedtime  enoxaparin Injectable 40 milliGRAM(s) SubCutaneous <User Schedule>  insulin glargine Injectable (LANTUS) 13 Unit(s) SubCutaneous at bedtime  insulin lispro (HumaLOG) corrective regimen sliding scale   SubCutaneous three times a day before meals  insulin lispro (HumaLOG) corrective regimen sliding scale   SubCutaneous at bedtime  insulin lispro Injectable (HumaLOG) 4 Unit(s) SubCutaneous three times a day with meals  levETIRAcetam 750 milliGRAM(s) Oral two times a day  levothyroxine 50 MICROGram(s) Oral daily  metoprolol tartrate 25 milliGRAM(s) Oral two times a day  NIFEdipine XL 60 milliGRAM(s) Oral daily  pantoprazole    Tablet 40 milliGRAM(s) Oral before breakfast  polyethylene glycol 3350 17 Gram(s) Oral every 12 hours  pregabalin 100 milliGRAM(s) Oral every 12 hours  senna 2 Tablet(s) Oral at bedtime    MEDICATIONS  (PRN):  acetaminophen   Tablet .. 650 milliGRAM(s) Oral every 6 hours PRN Temp greater or equal to 38C (100.4F), Mild Pain (1 - 3)  dextrose 40% Gel 15 Gram(s) Oral once PRN Blood Glucose LESS THAN 70 milliGRAM(s)/deciliter  glucagon  Injectable 1 milliGRAM(s) IntraMuscular once PRN Glucose LESS THAN 70 milligrams/deciliter  loperamide 2 milliGRAM(s) Oral daily PRN Diarrhea  traMADol 25 milliGRAM(s) Oral every 4 hours PRN Moderate Pain (4 - 6)      DIET: Mechanical soft

## 2019-11-06 NOTE — DISCHARGE NOTE PROVIDER - REASON FOR ADMISSION
Admitted 10/31 s/p left orbital craniotomy for subtotal resection of enlarging skull base meningioma with ENT

## 2019-11-06 NOTE — DISCHARGE NOTE PROVIDER - CARE PROVIDERS DIRECT ADDRESSES
[email or web address]

## 2019-11-06 NOTE — PROGRESS NOTE ADULT - PROBLEM SELECTOR PLAN 1
Sutures of left eye need to remain for 10-14 days.  Pt. will need to see Dr. Sellers in the office the week of November 11th for suture removal. After discharge from rehab, pt. will need follow up  by Dr. Olivia.

## 2019-11-06 NOTE — DISCHARGE NOTE NURSING/CASE MANAGEMENT/SOCIAL WORK - NSDCPELOVENOXCOMP_GEN_ALL_CORE
Enoxaparin/Lovenox is used to treat and prevent blood clots. Use a different body area each time you give yourself a shot. Keep track of where you give each shot to make sure you rotate body areas. Use a new needle and syringe each time you inject your medicine. Never skip a dose of Enoxaparin/Lovenox. You must use this medicine on a fixed schedule.  NEVER TAKE A DOUBLE DOSE. Call your doctor or pharmacist if you miss a dose. Take Enoxaparin/Lovenox at the same time each morning and/or evening.

## 2019-11-06 NOTE — DISCHARGE NOTE NURSING/CASE MANAGEMENT/SOCIAL WORK - NSDCPELOVENOXDIET_GEN_ALL_CORE
Eat healthy foods you enjoy. Enoxaparin/Lovenox DOES NOT have a special diet. Limit your alcohol intake.

## 2019-11-06 NOTE — PROGRESS NOTE ADULT - ASSESSMENT
77 YO F with  Skull base meningioma since 2010, it has gradually enlarged in size over the years, s/p left orbital enucleation (2018). Now S/P  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 with Left eye closure POD #6. After speaking with Dr. Olivia, since pt. had previous history of radiation treatment. 79 YO F with  Skull base meningioma since 2010, it has gradually enlarged in size over the years, s/p left orbital enucleation (2018). Now S/P  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 with Left eye closure POD #6 doing well

## 2019-11-06 NOTE — PROGRESS NOTE ADULT - ASSESSMENT
HTN  much better today  con current meds     HLD  on lipitor     DM  Monitor finger stick. Insulin coverage. Diabetic education and Diabetic diet. Consider nutrition consultation.    pt can follow up with me in office

## 2019-11-06 NOTE — DISCHARGE NOTE PROVIDER - CARE PROVIDER_API CALL
Cherie Culp)  Neurosurgery  [facility]  [address] [facility], [address] [place] [id number]  Phone: [phone]  Fax: [phone]  Follow Up Time:     Ruth Sellers)  Otolaryngology  [address] [place] [address] [address]  [place] [address]  Phone: [phone]  Fax: [phone]  Follow Up Time:

## 2019-11-06 NOTE — DISCHARGE NOTE NURSING/CASE MANAGEMENT/SOCIAL WORK - NSDCPELOVENOXREACT_GEN_ALL_CORE
Enoxaparin/Lovenox increases your risk for bleeding. Notify your doctor if you experience any of the following side effects: unusual bleeding or bruising, coughing up or vomiting blood, red or black stool, blood in your urine, itching or hives, chest tightness, chest pain, shortness of breath, trouble breathing, swelling in your face or hands, swelling in your mouth or throat, fever, large flat blue or purplish patches on the skin, numbness or weakness in your arm or leg on one side, pain in your lower leg, sudden or severe headache with vision, speech or walking problems. When Enoxaparin/Lovenox is taken with other medicines, they can affect how it works. Taking other medications such as aspirin, blood thinners, and nonsteroidal anti-inflammatories increases your risk of bleeding. It is very important to tell your health care provider about all of the other medicines, including over-the-counter medications, herbs, and vitamins you are taking. DO NOT start, stop, or change the dosage of any medicine, including over-the-counter medicines, vitamins, and herbal products without your doctor’s approval. Any products containing aspirin or are nonsteroidal anti-inflammatories lessen the blood’s ability to form clots and adds to the effect of Enoxaparin/Lovenox. Never take aspirin or medicines that contain aspirin without speaking to your doctor.

## 2019-11-06 NOTE — DISCHARGE NOTE PROVIDER - NSDCCPCAREPLAN_GEN_ALL_CORE_FT
PRINCIPAL DISCHARGE DIAGNOSIS  Diagnosis: Meningioma  Assessment and Plan of Treatment: Please make an appointment for follow up with neurosurgeon Dr. Cherie Culp's office 1 week after discharge from  Call [phone] to schedule an appointment. Left eye sutures will be removed by ENT team while at  Keep incision site clean and dry. No creams, lotions, or ointments to incision area. Ok to shower but do not allow water to hit incision site directly. Gently pat dry after shower.   NO heavy lifting, strenuous activity, twisting, bending, driving, or working until cleared by your physician.   Return to ER immediately for any of the following: fever, bleeding, new onset numbness/tingling/weakness, nausea and/or vomiting, chest pain, shortness of breath, confusion, seizure, altered mental status, urinary and/or fecal incontinence or retention.      SECONDARY DISCHARGE DIAGNOSES  Diagnosis: HTN (hypertension)  Assessment and Plan of Treatment: Please make an appointment for follow up with your primary care physician after discharge. Your blood pressure medications were changed during your hospital stay. Norvasc was discontinued and you were started on Nifedipine. Metoprolol dose increased to 25mg BID.    Diagnosis: T2DM (type 2 diabetes mellitus)  Assessment and Plan of Treatment: Please make an appointment for follow up with your primary care physician after discharge. Continue Lantus 13 units qHS. You were started on Humalog 4 TID AC for steroid induced hyperglycemia. FS well controlled on this regimen.

## 2019-11-06 NOTE — PROGRESS NOTE ADULT - ASSESSMENT
HPI: 77 y/o [place] female H/O HTN, T2DM, Hyperlipidemia, Sinus/pituitary brain tumor s/p excision (1982), s/p fall ->subdural hematoma s/p craniotomy  (2013), seizure disorder,  poor gait and balance, dementia, C/O Skull base meningioma since 2010, it has gradually enlarged in size over the years, s/p left orbital enucleation (2018). The skull base meningioma has experienced significant growth and now involving the cavernous sinus and wrapping around supraclinoid segments of left ICA, expending to orbit and filling it's content from apex to anterior portion of the orbit. Now scheduled for 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 . 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 Continue Nifedipine 60mg daily and Metoprolol 25mg BID. Off Norvasc.   - Continue Lantus 13units qHS and Humalog 4 TID AC with low dose HISS for history of T2DM; sugars higher in setting of steroid use  - Continue mechanical soft diet - tolerating well  - ENT follow up appreciated. Their team will go to [facility] to remove sutures 10-14 days post op OR she will need to follow up with Dr. Sellers for removal in office   - Continue Synthroid for history of hypothyroidism  - Encouraged mobilization with assistance  - Incentive spirometer if cooperative  - DVT prophylaxis: SQL, b/l venodynes  - Dispo: JACOB; discharge to [facility] today  - Will discuss with Dr. Culp    [facility] # [id number]

## 2019-11-11 NOTE — HISTORY OF PRESENT ILLNESS
[de-identified] : 80 yo F 1.5 wks post op from debulking of skull base and orbital meningioma and closure of left eyelids.  [FreeTextEntry1] : Since the OR, recovering in rehab. Still unable to walk. Speaks only Pry[place], which is her first language. \par No issues with incisions, eyelids closed.

## 2019-11-11 NOTE — ASSESSMENT
[FreeTextEntry1] : Assessment: \par 1. interval healing post op as expected. \par 2. postoperative delirium and deconditioning\par \par Plan: \par 1. Continue rehabilitation\par 2. f/u as needed\par 3. Ongoing f/u with NSx from recovery perspective

## 2019-11-11 NOTE — REASON FOR VISIT
[Subsequent Evaluation] : a subsequent evaluation for [FreeTextEntry2] : post op meningioma debulking, left eyelid closure

## 2019-11-11 NOTE — PHYSICAL EXAM
[de-identified] : Left eyelids closed, suture removed. Upper eyelid is slightly more swollen than lower eyelid as expected. Left orbit is soft. Left hemicoronal incision healing nicely.

## 2019-11-15 PROBLEM — R92.8 ABNORMAL FINDING ON BREAST IMAGING: Status: ACTIVE | Noted: 2019-01-01

## 2019-11-22 NOTE — DATA REVIEWED
[de-identified] : EXAM: MR BRAIN WAW IC \par \par \par PROCEDURE DATE: 11/04/2019 \par \par \par \par \par INTERPRETATION: . \par \par CLINICAL INFORMATION: Left-sided skull base meningioma resection. \par \par TECHNIQUE: Multiplanar multisequential MRI of the brain was acquired with \par and without the administration of IV gadolinium. 9 cc's of IV Gadavist was \par administered for the purposes of this examination. 1 cc was discarded. \par \par COMPARISON: Most recent prior CT examination of the head from 11/1/2019 \par \par FINDINGS: A left-sided pterional craniotomy is redemonstrated. Subjacent \par bifrontal extra-axial pneumocephalus is again noted. Bilateral subdural \par hygromas are noted which are new. The greatest transverse depth on the right \par measures up to 1.2 cm along the right frontal convexity. \par The greatest transverse depth on the left measures up to 9 mm along the left \par anterior frontal convexity. There is new smooth pachymeningeal enhancement \par which is diffuse and may be related to post surgical changes. \par \par There is a new trace amount of early subacute subdural hemorrhage along the \par left parietal and occipital convexities, best appreciated on the axial \par T1-weighted series (series 7, image 25). \par \par There has been interval partial resection/debulking of abnormal enhancing \par soft tissue within the left middle cranial fossa which abutted the left \par cavernous sinus. There remains abnormal enhancing soft tissue at the left \par orbital apex extending into the left orbital compartment with further \par extension into the infratemporal fossa and pterygopalatine fossa with \par abnormal enhancing soft tissue extending into the left posterior ethmoid air \par cells and left sphenoid sinus. Abnormal enhancing soft tissue in the left \par  space at the level of the pterygoid musculature and the left \par temporalis musculature is again appreciated. Abnormal enhancing soft tissue \par involving the left pterygoid plate and maxillary tuberosity is again noted \par extending to the central skull base. Abnormal enhancing soft tissue within \par the left posterior nasal cavity is again appreciated. Abnormal soft tissue \par enhancement in the left dorsum sella, left parasellar area, and tentorial \par reflection is again appreciated. Abnormal enhancement extends into the left \par Meckel's cave and slightly extends along the cisternal portion of the left \par trigeminal nerve. Abnormal perineural spread of tumor is again noted at the \par left foramen ovale, foramen rotundum, vidian canal and pterygopalatine fossa \par abnormal tumor is again noted to encase the cavernous and supraclinoid \par segments of the left internal carotid artery. \par \par Left globe enucleation postsurgical change is noted. \par \par There is an unchanged 7 mm extra-axial enhancing mass along the right \par temporal convexity (series 24 image 63). \par \par Multiple nonspecific T2/FLAIR hyperintense signal changes are noted \par throughout the bihemispheric white matter without associated mass effect or \par restricted diffusion. No hydrocephalus is appreciated. As above, no interval \par change.Flow-voids are noted throughout the major intracranial vessels, on \par the T2 weighted images, consistent with their patency. \par \par There is a polyp versus retention cyst within the right maxillary sinus. \par Bilateral mastoid air cell effusions are appreciated, left worse than right. \par \par IMPRESSION: \par \par 1. Trace subdural hemorrhage along the left parietal and occipital \par convexities. \par \par 2. Interval left-sided pterional craniotomy and partial resection/debulking \par of a previously noted extensive infiltrating meningioma, as discussed, when \par compared to the prior brain MRI examination from 10/24/2019. \par \par 3. Unchanged 7 mm probable meningioma along the right temporal convexity. \par \par 4. Thin bilateral convexity subdural hygromas. \par \par 5. Other additional expected postsurgical changes, as discussed. \par \par Findings were discussed with the neurosurgical resident on 11/5/2019 at \par 10:12 AM \par \par \par \par \par \par \par \par \par \par \par \par \par LAZARUS GOMEZ M.D., ATTENDING RADIOLOGIST \par This document has been electronically signed. Nov 5 2019 10:15AM

## 2019-11-22 NOTE — HISTORY OF PRESENT ILLNESS
[FreeTextEntry1] : This 79-year-old woman was diagnosed with a sphenocavernous orbital meningioma few years ago, and for some reasons and rationale that is not explainable to me, she underwent left eye enucleation for treatment of the  tumor related  proptosesShe has had prior surgery of pituitary tumor followed by radiation about 15 years ago.  She presented  to us for persistence of tumor in the orbit, cavernous sinus and temporal fossa, but most importantly presence of significant pain around the eye and in the lateral aspect of the face, which could be possibly related to irritation of the cavernous sinus.   The eye is disfigured with the eyelid in a retrograde fashion.  After discussion with Dr. Hilliard, he referred the patient to us and also to Dr. Delaney from ENT, and after consultation with the patient and discussion with her two daughters, we  decided for a procedure due to per pain and impaired quality of life.  The goal of the procedure was to debulk the tumor inside the orbit and the eyelid permanently to improve the pain related to eyelid retraction and also to improve the proptosis to  some extent.  Then, we proceeded with resection of the tumor from the lateral wall of the cavernous sinus to decrease the irritation in the cranial nerve and most importantly to create a decompression of the cavernous sinus, which will hopefully have  her pain reduced.. 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  Left eye is sewn closed per daughter.  Her left pterional incision is well approximated without any evidence of infection, swelling, drainage.  Dissolvable sutures intact. She reports intermittent headaches 4-5/10 not associated with other symptoms and relieved with Tylenol prn.  She also reports that her left leg feels heavy however she tries her best to participate in PT sessions.\par

## 2019-11-22 NOTE — PHYSICAL EXAM
[General Appearance - Alert] : alert [General Appearance - In No Acute Distress] : in no acute distress [General Appearance - Well Nourished] : well nourished [General Appearance - Well Developed] : well developed [Healing Well] : healing well [Sutures Intact] : closed with intact sutures [No Drainage] : without drainage [Normal Skin] : normal [Oriented To Time, Place, And Person] : oriented to person, place, and time [Impaired Insight] : insight and judgment were intact [Memory Recent] : recent memory was not impaired [Affect] : the affect was normal [Person] : oriented to person [Place] : oriented to place [Time] : oriented to time [Respiration, Rhythm And Depth] : normal respiratory rhythm and effort [] : no respiratory distress [Exaggerated Use Of Accessory Muscles For Inspiration] : no accessory muscle use [Heart Rate And Rhythm] : heart rate was normal and rhythm regular [Erythema] : not erythematous [Indurated] : not indurated [Tender] : not tender [Fluctuant] : not fluctuant [FreeTextEntry8] : came to visit via wheelchair [FreeTextEntry1] : left eye is closed - sewn closed per daughter

## 2019-11-22 NOTE — DATA REVIEWED
[de-identified] : EXAM: MR BRAIN WAW IC \par \par \par PROCEDURE DATE: 11/04/2019 \par \par \par \par \par INTERPRETATION: . \par \par CLINICAL INFORMATION: Left-sided skull base meningioma resection. \par \par TECHNIQUE: Multiplanar multisequential MRI of the brain was acquired with \par and without the administration of IV gadolinium. 9 cc's of IV Gadavist was \par administered for the purposes of this examination. 1 cc was discarded. \par \par COMPARISON: Most recent prior CT examination of the head from 11/1/2019 \par \par FINDINGS: A left-sided pterional craniotomy is redemonstrated. Subjacent \par bifrontal extra-axial pneumocephalus is again noted. Bilateral subdural \par hygromas are noted which are new. The greatest transverse depth on the right \par measures up to 1.2 cm along the right frontal convexity. \par The greatest transverse depth on the left measures up to 9 mm along the left \par anterior frontal convexity. There is new smooth pachymeningeal enhancement \par which is diffuse and may be related to post surgical changes. \par \par There is a new trace amount of early subacute subdural hemorrhage along the \par left parietal and occipital convexities, best appreciated on the axial \par T1-weighted series (series 7, image 25). \par \par There has been interval partial resection/debulking of abnormal enhancing \par soft tissue within the left middle cranial fossa which abutted the left \par cavernous sinus. There remains abnormal enhancing soft tissue at the left \par orbital apex extending into the left orbital compartment with further \par extension into the infratemporal fossa and pterygopalatine fossa with \par abnormal enhancing soft tissue extending into the left posterior ethmoid air \par cells and left sphenoid sinus. Abnormal enhancing soft tissue in the left \par  space at the level of the pterygoid musculature and the left \par temporalis musculature is again appreciated. Abnormal enhancing soft tissue \par involving the left pterygoid plate and maxillary tuberosity is again noted \par extending to the central skull base. Abnormal enhancing soft tissue within \par the left posterior nasal cavity is again appreciated. Abnormal soft tissue \par enhancement in the left dorsum sella, left parasellar area, and tentorial \par reflection is again appreciated. Abnormal enhancement extends into the left \par Meckel's cave and slightly extends along the cisternal portion of the left \par trigeminal nerve. Abnormal perineural spread of tumor is again noted at the \par left foramen ovale, foramen rotundum, vidian canal and pterygopalatine fossa \par abnormal tumor is again noted to encase the cavernous and supraclinoid \par segments of the left internal carotid artery. \par \par Left globe enucleation postsurgical change is noted. \par \par There is an unchanged 7 mm extra-axial enhancing mass along the right \par temporal convexity (series 24 image 63). \par \par Multiple nonspecific T2/FLAIR hyperintense signal changes are noted \par throughout the bihemispheric white matter without associated mass effect or \par restricted diffusion. No hydrocephalus is appreciated. As above, no interval \par change.Flow-voids are noted throughout the major intracranial vessels, on \par the T2 weighted images, consistent with their patency. \par \par There is a polyp versus retention cyst within the right maxillary sinus. \par Bilateral mastoid air cell effusions are appreciated, left worse than right. \par \par IMPRESSION: \par \par 1. Trace subdural hemorrhage along the left parietal and occipital \par convexities. \par \par 2. Interval left-sided pterional craniotomy and partial resection/debulking \par of a previously noted extensive infiltrating meningioma, as discussed, when \par compared to the prior brain MRI examination from 10/24/2019. \par \par 3. Unchanged 7 mm probable meningioma along the right temporal convexity. \par \par 4. Thin bilateral convexity subdural hygromas. \par \par 5. Other additional expected postsurgical changes, as discussed. \par \par Findings were discussed with the neurosurgical resident on 11/5/2019 at \par 10:12 AM \par \par \par \par \par \par \par \par \par \par \par \par \par LAZARUS GOMEZ M.D., ATTENDING RADIOLOGIST \par This document has been electronically signed. Nov 5 2019 10:15AM

## 2019-11-22 NOTE — PHYSICAL EXAM
[General Appearance - Alert] : alert [General Appearance - In No Acute Distress] : in no acute distress [General Appearance - Well Nourished] : well nourished [General Appearance - Well Developed] : well developed [Healing Well] : healing well [Sutures Intact] : closed with intact sutures [No Drainage] : without drainage [Normal Skin] : normal [Oriented To Time, Place, And Person] : oriented to person, place, and time [Impaired Insight] : insight and judgment were intact [Affect] : the affect was normal [Memory Recent] : recent memory was not impaired [Person] : oriented to person [Place] : oriented to place [Time] : oriented to time [Respiration, Rhythm And Depth] : normal respiratory rhythm and effort [] : no respiratory distress [Exaggerated Use Of Accessory Muscles For Inspiration] : no accessory muscle use [Heart Rate And Rhythm] : heart rate was normal and rhythm regular [Erythema] : not erythematous [Tender] : not tender [Indurated] : not indurated [Fluctuant] : not fluctuant [FreeTextEntry8] : came to visit via wheelchair [FreeTextEntry1] : left eye is closed - sewn closed per daughter

## 2019-11-22 NOTE — ASSESSMENT
[FreeTextEntry1] : IMPRESSION:\par 1. 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 f/U WITH Dr. Culp 12/23 2pm.\par 2. Encouraged pt to continue participating in PT sessions.\par 3. Advised pt to be careful to avoid falls/head trauma.\par 4. Pt to follow up with Dr. Olivia.

## 2019-11-22 NOTE — HISTORY OF PRESENT ILLNESS
[FreeTextEntry1] : This 79-year-old woman was diagnosed with a sphenocavernous orbital meningioma few years ago, and for some reasons and rationale that is not explainable to me, she underwent left eye enucleation for treatment of the  tumor related  proptosesShe has had prior surgery of pituitary tumor followed by radiation about 15 years ago.  She presented  to us for persistence of tumor in the orbit, cavernous sinus and temporal fossa, but most importantly presence of significant pain around the eye and in the lateral aspect of the face, which could be possibly related to irritation of the cavernous sinus.   The eye is disfigured with the eyelid in a retrograde fashion.  After discussion with Dr. Hilliard, he referred the patient to us and also to Dr. Delaney from ENT, and after consultation with the patient and discussion with her two daughters, we  decided for a procedure due to per pain and impaired quality of life.  The goal of the procedure was to debulk the tumor inside the orbit and the eyelid permanently to improve the pain related to eyelid retraction and also to improve the proptosis to  some extent.  Then, we proceeded with resection of the tumor from the lateral wall of the cavernous sinus to decrease the irritation in the cranial nerve and most importantly to create a decompression of the cavernous sinus, which will hopefully have  her pain reduced.. 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  Left eye is sewn closed per daughter.  Her left pterional incision is well approximated without any evidence of infection, swelling, drainage.  Dissolvable sutures intact. She reports intermittent headaches 4-5/10 not associated with other symptoms and relieved with Tylenol prn.  She also reports that her left leg feels heavy however she tries her best to participate in PT sessions.\par

## 2019-11-22 NOTE — REVIEW OF SYSTEMS
[Joint Pain] : joint pain [Negative] : Respiratory [de-identified] : headaches [FreeTextEntry2] : fatigue [FreeTextEntry9] : w

## 2019-12-24 PROBLEM — E55.9 VITAMIN D DEFICIENCY: Status: ACTIVE | Noted: 2017-10-24

## 2019-12-24 PROBLEM — D32.9 MENINGIOMA: Status: ACTIVE | Noted: 2017-12-22

## 2019-12-27 PROBLEM — G82.20 PARAPARESIS: Status: ACTIVE | Noted: 2019-01-01

## 2019-12-27 PROBLEM — D49.6 CAVERNOUS SINUS TUMOR: Status: ACTIVE | Noted: 2017-12-22

## 2019-12-27 NOTE — PHYSICAL EXAM
[General Appearance - In No Acute Distress] : in no acute distress [General Appearance - Well Nourished] : well nourished [General Appearance - Alert] : alert [General Appearance - Well Developed] : well developed [General Appearance - Well-Appearing] : healthy appearing [Well-Healed] : well-healed [Person] : oriented to person [Affect] : the affect was normal [Place] : oriented to place [] : no respiratory distress [Respiration, Rhythm And Depth] : normal respiratory rhythm and effort [Exaggerated Use Of Accessory Muscles For Inspiration] : no accessory muscle use [Heart Rate And Rhythm] : heart rate was normal and rhythm regular [FreeTextEntry1] : left pterional  [Time] : disoriented to time

## 2019-12-27 NOTE — HISTORY OF PRESENT ILLNESS
[FreeTextEntry1] : This 79-year-old woman was diagnosed with a spheno_cavernous orbital meningioma few years ago, and for some reasons and rationale that is not explainable to me, she underwent left eye enucleation for treatment of the tumor related proptosesShe has had prior surgery of pituitary tumor followed by radiation about 15 years ago. She presented to us for persistence of tumor in the orbit, cavernous sinus and temporal fossa, but most importantly presence of significant pain around the eye and in the lateral aspect of the face, which could be possibly related to irritation of the cavernous sinus. The eye is disfigured with the eyelid in a retrograde fashion. After discussion with Dr. Hilliard, he referred the patient to us and also to Dr. Delaney from ENT, and after consultation with the patient and discussion with her two daughters, we decided for a procedure due to per pain and impaired quality of life. The goal of the procedure was to debulk the tumor inside the orbit and the eyelid permanently to improve the pain related to eyelid retraction and also to improve the proptosis to some extent. Then, we proceeded with resection of the tumor from the lateral wall of the cavernous sinus to decrease the irritation in the cranial nerve and most importantly to create a decompression of the cavernous sinus, which will hopefully have her pain reduced.. \par \par On 10/31/19 she underwent a left pterional craniotomy, anterior clinoidectomy, and partial resection of a spheno_orbital/cavernous sinus meningioma.\par \par Today she presents via stretcher for her second post op visit accompanied by ambulette service and her daughter from home.  She was discharged from [facility] on 12/12/19. Left eye is sewn closed per daughter. Her left pterional incision is well healed.   She denies headaches  She also reports that it hurts when she walks. She has home PT sessions 2 x week. \par \par

## 2019-12-27 NOTE — END OF VISIT
[Time Spent: ___ minutes] : I have spent [unfilled] minutes of face to face time with the patient [>50% of Time Spent on Counseling and Coordination of Care for  ___] : Greater than 50% of the encounter time was spent on counseling and coordination of care for [unfilled] Skin Substitute Text: The defect edges were debeveled with a #15 scalpel blade.  Given the location of the defect, shape of the defect and the proximity to free margins a skin substitute graft was deemed most appropriate.  The graft material was trimmed to fit the size of the defect. The graft was then placed in the primary defect and oriented appropriately.

## 2019-12-31 PROBLEM — Z86.018 HISTORY OF MENINGIOMA: Status: RESOLVED | Noted: 2019-01-01 | Resolved: 2019-01-01

## 2019-12-31 PROBLEM — Z86.69 HISTORY OF IMPAIRED COGNITION: Status: RESOLVED | Noted: 2019-01-01 | Resolved: 2019-01-01

## 2019-12-31 PROBLEM — G40.909 EPILEPSY: Status: ACTIVE | Noted: 2019-01-01

## 2019-12-31 PROBLEM — Z79.899 POLYPHARMACY: Status: ACTIVE | Noted: 2019-01-01

## 2019-12-31 PROBLEM — K59.03 DRUG-INDUCED CONSTIPATION: Status: ACTIVE | Noted: 2019-01-01

## 2019-12-31 PROBLEM — G31.84 MCI (MILD COGNITIVE IMPAIRMENT): Status: RESOLVED | Noted: 2019-01-01 | Resolved: 2019-01-01

## 2019-12-31 PROBLEM — R26.81 GAIT INSTABILITY: Status: ACTIVE | Noted: 2019-01-01

## 2019-12-31 PROBLEM — R53.81 PHYSICAL DECONDITIONING: Status: ACTIVE | Noted: 2019-01-01

## 2020-01-01 ENCOUNTER — RX RENEWAL (OUTPATIENT)
Age: 80
End: 2020-01-01

## 2020-01-01 ENCOUNTER — NON-APPOINTMENT (OUTPATIENT)
Age: 80
End: 2020-01-01

## 2020-01-01 ENCOUNTER — FORM ENCOUNTER (OUTPATIENT)
Age: 80
End: 2020-01-01

## 2020-01-01 ENCOUNTER — APPOINTMENT (OUTPATIENT)
Dept: MRI IMAGING | Facility: CLINIC | Age: 80
End: 2020-01-01
Payer: MEDICARE

## 2020-01-01 ENCOUNTER — APPOINTMENT (OUTPATIENT)
Dept: GERIATRICS | Facility: CLINIC | Age: 80
End: 2020-01-01

## 2020-01-01 ENCOUNTER — APPOINTMENT (OUTPATIENT)
Dept: OTOLARYNGOLOGY | Facility: CLINIC | Age: 80
End: 2020-01-01
Payer: MEDICARE

## 2020-01-01 ENCOUNTER — APPOINTMENT (OUTPATIENT)
Dept: GERIATRICS | Facility: CLINIC | Age: 80
End: 2020-01-01
Payer: MEDICARE

## 2020-01-01 ENCOUNTER — APPOINTMENT (OUTPATIENT)
Dept: NEUROLOGY | Facility: CLINIC | Age: 80
End: 2020-01-01
Payer: MEDICARE

## 2020-01-01 ENCOUNTER — CLINICAL ADVICE (OUTPATIENT)
Age: 80
End: 2020-01-01

## 2020-01-01 ENCOUNTER — APPOINTMENT (OUTPATIENT)
Dept: ENDOCRINOLOGY | Facility: CLINIC | Age: 80
End: 2020-01-01
Payer: MEDICARE

## 2020-01-01 ENCOUNTER — OUTPATIENT (OUTPATIENT)
Dept: OUTPATIENT SERVICES | Facility: HOSPITAL | Age: 80
LOS: 1 days | End: 2020-01-01
Payer: MEDICARE

## 2020-01-01 ENCOUNTER — APPOINTMENT (OUTPATIENT)
Dept: MRI IMAGING | Facility: IMAGING CENTER | Age: 80
End: 2020-01-01

## 2020-01-01 ENCOUNTER — APPOINTMENT (OUTPATIENT)
Dept: OPHTHALMOLOGY | Facility: CLINIC | Age: 80
End: 2020-01-01
Payer: MEDICARE

## 2020-01-01 ENCOUNTER — APPOINTMENT (OUTPATIENT)
Dept: UROLOGY | Facility: CLINIC | Age: 80
End: 2020-01-01
Payer: MEDICARE

## 2020-01-01 ENCOUNTER — APPOINTMENT (OUTPATIENT)
Dept: SPEECH THERAPY | Facility: CLINIC | Age: 80
End: 2020-01-01

## 2020-01-01 ENCOUNTER — APPOINTMENT (OUTPATIENT)
Dept: MRI IMAGING | Facility: CLINIC | Age: 80
End: 2020-01-01

## 2020-01-01 ENCOUNTER — OUTPATIENT (OUTPATIENT)
Dept: OUTPATIENT SERVICES | Facility: HOSPITAL | Age: 80
LOS: 1 days | Discharge: HOME | End: 2020-01-01

## 2020-01-01 VITALS
RESPIRATION RATE: 15 BRPM | BODY MASS INDEX: 37.5 KG/M2 | SYSTOLIC BLOOD PRESSURE: 110 MMHG | HEART RATE: 69 BPM | HEIGHT: 59 IN | WEIGHT: 186 LBS | DIASTOLIC BLOOD PRESSURE: 60 MMHG | TEMPERATURE: 98.2 F | OXYGEN SATURATION: 95 %

## 2020-01-01 VITALS
HEART RATE: 74 BPM | SYSTOLIC BLOOD PRESSURE: 130 MMHG | DIASTOLIC BLOOD PRESSURE: 80 MMHG | OXYGEN SATURATION: 94 % | HEIGHT: 59 IN

## 2020-01-01 DIAGNOSIS — F03.91 UNSPECIFIED DEMENTIA WITH BEHAVIORAL DISTURBANCE: ICD-10-CM

## 2020-01-01 DIAGNOSIS — F05 DELIRIUM DUE TO KNOWN PHYSIOLOGICAL CONDITION: ICD-10-CM

## 2020-01-01 DIAGNOSIS — Z79.4 TYPE 2 DIABETES MELLITUS WITH OTHER DIABETIC NEUROLOGICAL COMPLICATION: ICD-10-CM

## 2020-01-01 DIAGNOSIS — Z87.09 PERSONAL HISTORY OF OTHER DISEASES OF THE RESPIRATORY SYSTEM: ICD-10-CM

## 2020-01-01 DIAGNOSIS — R13.12 DYSPHAGIA, OROPHARYNGEAL PHASE: ICD-10-CM

## 2020-01-01 DIAGNOSIS — E27.8 OTHER SPECIFIED DISORDERS OF ADRENAL GLAND: ICD-10-CM

## 2020-01-01 DIAGNOSIS — I95.9 HYPOTENSION, UNSPECIFIED: ICD-10-CM

## 2020-01-01 DIAGNOSIS — G89.29 HEADACHE: ICD-10-CM

## 2020-01-01 DIAGNOSIS — E78.5 HYPERLIPIDEMIA, UNSPECIFIED: ICD-10-CM

## 2020-01-01 DIAGNOSIS — Z71.89 OTHER SPECIFIED COUNSELING: ICD-10-CM

## 2020-01-01 DIAGNOSIS — R06.82 TACHYPNEA, NOT ELSEWHERE CLASSIFIED: ICD-10-CM

## 2020-01-01 DIAGNOSIS — R52 PAIN, UNSPECIFIED: ICD-10-CM

## 2020-01-01 DIAGNOSIS — S06.5X9A TRAUMATIC SUBDURAL HEMORRHAGE WITH LOSS OF CONSCIOUSNESS OF UNSPECIFIED DURATION, INITIAL ENCOUNTER: Chronic | ICD-10-CM

## 2020-01-01 DIAGNOSIS — H65.92 UNSPECIFIED NONSUPPURATIVE OTITIS MEDIA, LEFT EAR: ICD-10-CM

## 2020-01-01 DIAGNOSIS — E87.6 HYPOKALEMIA: ICD-10-CM

## 2020-01-01 DIAGNOSIS — R53.81 OTHER MALAISE: ICD-10-CM

## 2020-01-01 DIAGNOSIS — I10 ESSENTIAL (PRIMARY) HYPERTENSION: ICD-10-CM

## 2020-01-01 DIAGNOSIS — E03.9 HYPOTHYROIDISM, UNSPECIFIED: ICD-10-CM

## 2020-01-01 DIAGNOSIS — H91.90 UNSPECIFIED HEARING LOSS, UNSPECIFIED EAR: ICD-10-CM

## 2020-01-01 DIAGNOSIS — Z96.22 MYRINGOTOMY TUBE(S) STATUS: Chronic | ICD-10-CM

## 2020-01-01 DIAGNOSIS — R06.02 SHORTNESS OF BREATH: ICD-10-CM

## 2020-01-01 DIAGNOSIS — I69.820 APHASIA FOLLOWING OTHER CEREBROVASCULAR DISEASE: ICD-10-CM

## 2020-01-01 DIAGNOSIS — R51 HEADACHE: ICD-10-CM

## 2020-01-01 DIAGNOSIS — Z90.01 ACQUIRED ABSENCE OF EYE: Chronic | ICD-10-CM

## 2020-01-01 DIAGNOSIS — R68.89 OTHER GENERAL SYMPTOMS AND SIGNS: ICD-10-CM

## 2020-01-01 DIAGNOSIS — R56.9 UNSPECIFIED CONVULSIONS: ICD-10-CM

## 2020-01-01 DIAGNOSIS — Z87.898 PERSONAL HISTORY OF OTHER SPECIFIED CONDITIONS: ICD-10-CM

## 2020-01-01 DIAGNOSIS — E11.49 TYPE 2 DIABETES MELLITUS WITH OTHER DIABETIC NEUROLOGICAL COMPLICATION: ICD-10-CM

## 2020-01-01 DIAGNOSIS — R26.81 UNSTEADINESS ON FEET: ICD-10-CM

## 2020-01-01 DIAGNOSIS — R00.0 TACHYCARDIA, UNSPECIFIED: ICD-10-CM

## 2020-01-01 DIAGNOSIS — D49.7 NEOPLASM OF UNSPECIFIED BEHAVIOR OF ENDOCRINE GLANDS AND OTHER PARTS OF NERVOUS SYSTEM: ICD-10-CM

## 2020-01-01 DIAGNOSIS — R05 COUGH: ICD-10-CM

## 2020-01-01 DIAGNOSIS — E51.9 THIAMINE DEFICIENCY, UNSPECIFIED: ICD-10-CM

## 2020-01-01 DIAGNOSIS — R41.0 DISORIENTATION, UNSPECIFIED: ICD-10-CM

## 2020-01-01 LAB
25(OH)D3 SERPL-MCNC: 20.6 NG/ML
ALBUMIN SERPL ELPH-MCNC: 3.1 G/DL
ALBUMIN SERPL ELPH-MCNC: 4.1 G/DL
ALP BLD-CCNC: 73 U/L
ALP BLD-CCNC: 90 U/L
ALT SERPL-CCNC: 17 U/L
ALT SERPL-CCNC: 51 U/L
ANION GAP SERPL CALC-SCNC: 15 MMOL/L
ANION GAP SERPL CALC-SCNC: 16 MMOL/L
AST SERPL-CCNC: 17 U/L
AST SERPL-CCNC: 17 U/L
B BURGDOR AB SER-IMP: NEGATIVE
B BURGDOR IGG+IGM SER QL: 0.05 INDEX
BASOPHILS # BLD AUTO: 0.02 K/UL
BASOPHILS # BLD AUTO: 0.02 K/UL
BASOPHILS NFR BLD AUTO: 0.2 %
BASOPHILS NFR BLD AUTO: 0.2 %
BILIRUB SERPL-MCNC: 0.3 MG/DL
BILIRUB SERPL-MCNC: 0.9 MG/DL
BUN SERPL-MCNC: 10 MG/DL
BUN SERPL-MCNC: 11 MG/DL
CALCIUM SERPL-MCNC: 8.2 MG/DL
CALCIUM SERPL-MCNC: 9.4 MG/DL
CHLORIDE SERPL-SCNC: 103 MMOL/L
CHLORIDE SERPL-SCNC: 99 MMOL/L
CO2 SERPL-SCNC: 27 MMOL/L
CO2 SERPL-SCNC: 29 MMOL/L
CREAT SERPL-MCNC: 0.54 MG/DL
CREAT SERPL-MCNC: 0.59 MG/DL
CRP SERPL-MCNC: 11.01 MG/DL
EOSINOPHIL # BLD AUTO: 0 K/UL
EOSINOPHIL # BLD AUTO: 0.01 K/UL
EOSINOPHIL NFR BLD AUTO: 0 %
EOSINOPHIL NFR BLD AUTO: 0.1 %
ERYTHROCYTE [SEDIMENTATION RATE] IN BLOOD BY WESTERGREN METHOD: 93 MM/HR
ESTIMATED AVERAGE GLUCOSE: 114 MG/DL
ESTIMATED AVERAGE GLUCOSE: 120 MG/DL
FERRITIN SERPL-MCNC: 201 NG/ML
FOLATE SERPL-MCNC: 8.4 NG/ML
GLUCOSE BLDC GLUCOMTR-MCNC: 107
GLUCOSE SERPL-MCNC: 121 MG/DL
GLUCOSE SERPL-MCNC: 93 MG/DL
HBA1C MFR BLD HPLC: 5.6 %
HBA1C MFR BLD HPLC: 5.8
HBA1C MFR BLD HPLC: 5.8 %
HCT VFR BLD CALC: 41.3 %
HCT VFR BLD CALC: 41.8 %
HGB BLD-MCNC: 12.4 G/DL
HGB BLD-MCNC: 12.6 G/DL
IMM GRANULOCYTES NFR BLD AUTO: 0.8 %
IMM GRANULOCYTES NFR BLD AUTO: 1.3 %
LEVETIRACETAM SERPL-MCNC: 7.1 MCG/ML
LYMPHOCYTES # BLD AUTO: 1.22 K/UL
LYMPHOCYTES # BLD AUTO: 1.53 K/UL
LYMPHOCYTES NFR BLD AUTO: 13.2 %
LYMPHOCYTES NFR BLD AUTO: 14.9 %
MAN DIFF?: NORMAL
MAN DIFF?: NORMAL
MCHC RBC-ENTMCNC: 27.4 PG
MCHC RBC-ENTMCNC: 28.1 PG
MCHC RBC-ENTMCNC: 30 GM/DL
MCHC RBC-ENTMCNC: 30.1 GM/DL
MCV RBC AUTO: 90.9 FL
MCV RBC AUTO: 93.4 FL
MONOCYTES # BLD AUTO: 0.42 K/UL
MONOCYTES # BLD AUTO: 0.59 K/UL
MONOCYTES NFR BLD AUTO: 4.6 %
MONOCYTES NFR BLD AUTO: 5.8 %
NEUTROPHILS # BLD AUTO: 7.49 K/UL
NEUTROPHILS # BLD AUTO: 7.97 K/UL
NEUTROPHILS NFR BLD AUTO: 77.7 %
NEUTROPHILS NFR BLD AUTO: 81.2 %
NT-PROBNP SERPL-MCNC: 77 PG/ML
PLATELET # BLD AUTO: 205 K/UL
PLATELET # BLD AUTO: 207 K/UL
POTASSIUM SERPL-SCNC: 3.2 MMOL/L
POTASSIUM SERPL-SCNC: 3.3 MMOL/L
PROCALCITONIN SERPL-MCNC: 0.04 NG/ML
PROT SERPL-MCNC: 5.7 G/DL
PROT SERPL-MCNC: 6.4 G/DL
RBC # BLD: 4.42 M/UL
RBC # BLD: 4.6 M/UL
RBC # FLD: 15.4 %
RBC # FLD: 18.3 %
SODIUM SERPL-SCNC: 144 MMOL/L
SODIUM SERPL-SCNC: 146 MMOL/L
T3FREE SERPL-MCNC: 1.57 PG/ML
T4 FREE SERPL-MCNC: 1.3 NG/DL
TSH SERPL-ACNC: 0.7 UIU/ML
VIT B1 SERPL-MCNC: 63.5 NMOL/L
VIT B12 SERPL-MCNC: 761 PG/ML
WBC # FLD AUTO: 10.25 K/UL
WBC # FLD AUTO: 9.22 K/UL

## 2020-01-01 PROCEDURE — 99213 OFFICE O/P EST LOW 20 MIN: CPT

## 2020-01-01 PROCEDURE — 74183 MRI ABD W/O CNTR FLWD CNTR: CPT | Mod: 26

## 2020-01-01 PROCEDURE — 72148 MRI LUMBAR SPINE W/O DYE: CPT | Mod: 26

## 2020-01-01 PROCEDURE — 99214 OFFICE O/P EST MOD 30 MIN: CPT

## 2020-01-01 PROCEDURE — 92582 CONDITIONING PLAY AUDIOMETRY: CPT

## 2020-01-01 PROCEDURE — 83036 HEMOGLOBIN GLYCOSYLATED A1C: CPT | Mod: QW

## 2020-01-01 PROCEDURE — G9005: CPT

## 2020-01-01 PROCEDURE — 99215 OFFICE O/P EST HI 40 MIN: CPT

## 2020-01-01 PROCEDURE — 92012 INTRM OPH EXAM EST PATIENT: CPT

## 2020-01-01 PROCEDURE — 72148 MRI LUMBAR SPINE W/O DYE: CPT

## 2020-01-01 PROCEDURE — 99215 OFFICE O/P EST HI 40 MIN: CPT | Mod: 95

## 2020-01-01 PROCEDURE — 92567 TYMPANOMETRY: CPT

## 2020-01-01 PROCEDURE — 95910 NRV CNDJ TEST 7-8 STUDIES: CPT

## 2020-01-01 PROCEDURE — G2012 BRIEF CHECK IN BY MD/QHP: CPT

## 2020-01-01 PROCEDURE — 95886 MUSC TEST DONE W/N TEST COMP: CPT

## 2020-01-01 PROCEDURE — 95819 EEG AWAKE AND ASLEEP: CPT

## 2020-01-01 PROCEDURE — 74183 MRI ABD W/O CNTR FLWD CNTR: CPT

## 2020-01-01 PROCEDURE — 82962 GLUCOSE BLOOD TEST: CPT

## 2020-01-01 PROCEDURE — 99213 OFFICE O/P EST LOW 20 MIN: CPT | Mod: 25

## 2020-01-01 PROCEDURE — A9585: CPT

## 2020-01-01 RX ORDER — PREGABALIN 50 MG/1
50 CAPSULE ORAL TWICE DAILY
Qty: 60 | Refills: 0 | Status: ACTIVE | COMMUNITY
Start: 2019-01-01 | End: 1900-01-01

## 2020-01-01 RX ORDER — POTASSIUM CHLORIDE 1.5 G/1
20 POWDER, FOR SOLUTION ORAL
Qty: 7 | Refills: 0 | Status: ACTIVE | COMMUNITY
Start: 2020-01-01 | End: 1900-01-01

## 2020-01-01 RX ORDER — POTASSIUM CHLORIDE 1.5 G/1.58G
20 POWDER, FOR SOLUTION ORAL DAILY
Qty: 7 | Refills: 0 | Status: ACTIVE | COMMUNITY
Start: 2020-01-01 | End: 1900-01-01

## 2020-01-01 RX ORDER — AMLODIPINE BESYLATE 10 MG/1
10 TABLET ORAL
Qty: 90 | Refills: 1 | Status: ACTIVE | COMMUNITY
Start: 2020-01-01 | End: 1900-01-01

## 2020-01-01 RX ORDER — DEXAMETHASONE 2 MG/1
2 TABLET ORAL
Refills: 0 | Status: DISCONTINUED | COMMUNITY
End: 2020-01-01

## 2020-01-01 RX ORDER — MINOXIDIL 2 %
25 MCG SOLUTION, NON-ORAL TOPICAL
Qty: 90 | Refills: 1 | Status: ACTIVE | COMMUNITY
Start: 2020-01-01 | End: 1900-01-01

## 2020-01-01 RX ORDER — DULOXETINE 30 MG/1
30 CAPSULE, DELAYED RELEASE ORAL
Qty: 30 | Refills: 0 | Status: DISCONTINUED | COMMUNITY
Start: 2020-01-01 | End: 2020-01-01

## 2020-01-01 RX ORDER — ALBUTEROL SULFATE 2.5 MG/3ML
(2.5 MG/3ML) SOLUTION RESPIRATORY (INHALATION) EVERY 6 HOURS
Qty: 1 | Refills: 1 | Status: ACTIVE | COMMUNITY
Start: 2020-01-01 | End: 1900-01-01

## 2020-01-01 RX ORDER — INSULIN GLARGINE 100 [IU]/ML
100 INJECTION, SOLUTION SUBCUTANEOUS
Qty: 5 | Refills: 1 | Status: ACTIVE | COMMUNITY
Start: 2019-01-01 | End: 1900-01-01

## 2020-01-01 RX ORDER — NIFEDIPINE 60 MG/1
60 TABLET, EXTENDED RELEASE ORAL DAILY
Refills: 0 | Status: DISCONTINUED | COMMUNITY
End: 2020-01-01

## 2020-01-01 RX ORDER — SIMVASTATIN 20 MG/1
20 TABLET, FILM COATED ORAL
Qty: 90 | Refills: 1 | Status: ACTIVE | COMMUNITY
Start: 2020-01-01 | End: 1900-01-01

## 2020-01-01 RX ORDER — SOFT LENS DISINFECTANT
SOLUTION, NON-ORAL MISCELLANEOUS
Qty: 1 | Refills: 0 | Status: ACTIVE | COMMUNITY
Start: 2020-01-01 | End: 1900-01-01

## 2020-01-01 RX ORDER — TRAMADOL HYDROCHLORIDE 50 MG/1
50 TABLET, COATED ORAL DAILY
Refills: 0 | Status: DISCONTINUED | COMMUNITY
End: 2020-01-01

## 2020-01-01 RX ORDER — GUAIFENESIN 100 MG/5ML
100 LIQUID (ML) ORAL EVERY 4 HOURS
Qty: 6 | Refills: 1 | Status: ACTIVE | COMMUNITY
Start: 2020-01-01 | End: 1900-01-01

## 2020-01-01 RX ORDER — DULOXETINE HYDROCHLORIDE 60 MG/1
60 CAPSULE, DELAYED RELEASE PELLETS ORAL
Refills: 0 | Status: ACTIVE | COMMUNITY
Start: 2020-01-01

## 2020-01-01 RX ORDER — LEVETIRACETAM 500 MG/1
500 TABLET, FILM COATED ORAL TWICE DAILY
Qty: 180 | Refills: 1 | Status: ACTIVE | COMMUNITY
Start: 2017-10-23 | End: 1900-01-01

## 2020-01-01 RX ORDER — PREDNISONE 10 MG/1
10 TABLET ORAL
Qty: 30 | Refills: 0 | Status: DISCONTINUED | COMMUNITY
Start: 2020-01-01 | End: 2020-01-01

## 2020-01-01 RX ORDER — CHLORHEXIDINE GLUCONATE 4 %
1000 LIQUID (ML) TOPICAL DAILY
Qty: 90 | Refills: 3 | Status: ACTIVE | COMMUNITY
Start: 2020-01-01

## 2020-01-01 RX ORDER — MORPHINE SULFATE 100 MG/5ML
20 SOLUTION ORAL
Qty: 1 | Refills: 0 | Status: ACTIVE | COMMUNITY
Start: 2020-01-01 | End: 1900-01-01

## 2020-01-01 RX ORDER — PREDNISONE 1 MG/1
1 TABLET ORAL
Qty: 200 | Refills: 0 | Status: DISCONTINUED | COMMUNITY
Start: 2020-01-01 | End: 2020-01-01

## 2020-01-01 RX ORDER — PREDNISONE 1 MG/1
1 TABLET ORAL
Qty: 30 | Refills: 5 | Status: ACTIVE | COMMUNITY
Start: 2020-01-01 | End: 1900-01-01

## 2020-01-01 RX ORDER — MENTHOL/CETYLPYRD CL 4.5 MG
5.4 LOZENGE MUCOUS MEMBRANE
Qty: 42 | Refills: 0 | Status: ACTIVE | COMMUNITY
Start: 2020-01-01 | End: 1900-01-01

## 2020-01-06 PROBLEM — E03.9 PRIMARY HYPOTHYROIDISM: Status: ACTIVE | Noted: 2017-03-01

## 2020-01-06 NOTE — REASON FOR VISIT
[Follow-Up: _____] : a [unfilled] follow-up visit [Formal Caregiver] : formal caregiver [Other: _____] : [unfilled] [FreeTextEntry1] : multiple health issues as below.

## 2020-01-06 NOTE — DATA REVIEWED
[FreeTextEntry1] :  \par 12- 08:30 Order Number  [id number]  \par \par \par  Sodium, Serum 143    mmol/L  135 - 145 Final      \par  Potassium, Serum 3.8    mmol/L  3.5 - 5.3 Final      \par  Chloride, Serum 102    mmol/L  96 - 108 Final      \par  Carbon Dioxide, Serum 29    mmol/L  22 - 31 Final      \par  Anion Gap, Serum 12    mmol/L  5 - 17 Final      \par  Blood Urea Nitrogen, Serum 16    mg/dL  7 - 23 Final      \par  Creatinine, Serum 0.68    mg/dL  0.50 - 1.30 Final      \par  Glucose, Serum 97    mg/dL  70 - 99 Final      \par  Calcium, Total Serum 8.5    mg/dL  8.4 - 10.5 Final      \par  eGFR if Non African American 84    mL/min/1.73M2  >=60 Final \par  eGFR if  97    mL/min/1.73M2  >=60 Final      \par \par \par  \par   \par  \par

## 2020-01-06 NOTE — HISTORY OF PRESENT ILLNESS
[FreeTextEntry1] : LAURENCE KEY is a 79 year old female with PMHx of mild dementia, cavernous sinus/pituitary mass-suspected, SDH s/p craniotomy, L eye blindness, depression, anxiety, HTN, HDL, Type 2 DM, adrenal adenoma here for endocrine follow up. \par Patient has severe dementia, she requires 24 hour care.  Her 2 daughters are very involved in her medical care. \par \par Regarding type 2 diabetes mellitus, patient is currently taking Victoza 1.2 mg daily.  She is taking metformin 500 mg twice daily and Lantus 13 units at bedtime.  Patient checks her sugar about once daily.  Range is from 130-144 mg/dL.\par \par Regarding adrenal nodule, patient was found to have a 3 cm LEFT adrenal mass, additional subcentimeter adrenal nodules.  Findings were concerning for neoplastic disease.  Blood work including 24-hour catecholamines were negative. Chromogranin A was very mildly elevated Chromogranin A 148 ng/dl (<93).  Gastrin level was elevated.  Serotonin and VIP within normal limits.  24 hour 5HIAA was done and was found to be within normal limits as well by me.  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  Her salivary cortisol level was also elevated to 140 ng/dl (normal <100).  She denies any history of osteoporosis. No easy bruising, no pigmented striae. The Houn[place] Unit  of the largest mass was 90. She established care with Dr. Mendoza, repeat salivary cortisol 152 ng/dl (<100 is normal) and 24 hour urine for free cortisol again is normal 17 mcg/24h (Sept 2019).  She has an upcoming repeat MRI to monitor for growth of adrenal nodule.  \par \par Regarding hypertension:She's taking amlodipine 5mg daily and metoprolol 25mg daily \par \par Regarding hyperlipidemia, she is currently on simvastatin 20mg daily \par \par Regarding elevated prolactin level, she had a prior history of pituitary lesion s/p surgery.  \par \par Regarding hypothyroidism, she's currently taking LT4 50mcg daily.  \par \par Underwent LEFT pterional craniotomy, anterior clinoidectomy, and partial resection of spheno-orbital/cavernous sinus meningoma on Oct 2019.  It appears that she was started on dexamethasone 2 mg twice daily while she was in the hospital.  After discharge, patient went to rehabilitation.  [facility].  Patient is still taking dexamethasone 2 mg once daily.\par

## 2020-01-06 NOTE — PHYSICAL EXAM
[Well Nourished] : well nourished [Well Developed] : well developed [Normal Hearing] : hearing was normal [No LAD] : no lymphadenopathy [Thyroid Not Enlarged] : the thyroid was not enlarged [No Thyroid Nodules] : there were no palpable thyroid nodules [No Accessory Muscle Use] : no accessory muscle use [Clear to Auscultation] : lungs were clear to auscultation bilaterally [Normal Rate] : heart rate was normal  [Normal S1, S2] : normal S1 and S2 [Regular Rhythm] : with a regular rhythm [Pedal Pulses Normal] : the pedal pulses are present [No Edema] : there was no peripheral edema [Not Tender] : non-tender [Soft] : abdomen soft [Post Cervical Nodes] : posterior cervical nodes [Not Distended] : not distended [Anterior Cervical Nodes] : anterior cervical nodes [No Spinal Tenderness] : no spinal tenderness [Normal] : normal [Full ROM] : with full range of motion [Cranial Nerves Intact] : cranial nerves 2-12 were intact [No Motor Deficits] : the motor exam was normal [No Tremors] : no tremors [de-identified] : left eye sewn closed.  [Diminished Throughout Both Feet] : normal tactile sensation with monofilament testing throughout both feet [de-identified] : sits in wheel chair, unable to access gaint [de-identified] : POOR RECENT MEMORY

## 2020-01-06 NOTE — ASSESSMENT
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 \par Recommend to continue with Victoza 1.2mg daily\par Recommend to continue with metformin 1500mg daily \par Check FSG 2-3 times daily\par \par 2)HTN\par Stable, continue with current regimen. Metoporlol 25mg 1/2 tablets once daily\par NiFedipine 60mg once daily.  \par \par 3)HLD\par Continue with simvastatin 20mg daily \par \par 4)History of pituitary tumor\par Prolactin level mildly elevated, not going to repeat this visit.  \par \par 5) Hypothyroidism \par TSH normal March 2019, continue with current regimen.  \par Keep same medication\par \par 6)Adrenal incidentaloma\par AM cortisol was not suppressed x3 tests\par Salivary cortisol mildly elevated. \par Urine cortisol was normal. \par The Hounsfield unit of the largest nodule was 90. \par Recent MRI of abdomen had to be rescheduled, will be following up with Urology in 3 months, should the size increase urology team is considering surgical intervention. \par DXA done for bone density and is within normal limits. \par \par 5)Steroid use\par Patient is currently taking high does of steroid, unable to repeat blood work or urine test to testing for Cushings' syndrome, which is of concern due to the history of her adrenal nodule. \par I will contact neurosurgery to see if it's appropriate for patient to be taper off the steroids.\par If it is not indicated for cerebral edema, I would design and a steroid taper program for patient to follow.\par \par \par Diabetes Health Care Maintenance\par - Opthalmology up to date: Yes\par -Podiatry up to date: Last seen in may 2018, referral given today 10/30/2018 \par -Antiplatelet agent: No\par  -Urine microalbumin: No recent labs. check today \par -ACE-I/ARB: No\par -Patient to call for persistent glucose < 70 or > 300\par -Discussed hypoglycemic protocol. \par -Yearly flu vaccine recommended \par \par EDUCATION: Reviewed 'ABC' of diabetes management (respective goals in parentheses): A1C (<7), blood pressure (<140/90), and cholesterol (LDL <100).\par \par FU in 2-3 months. \par \par  \par .  [Hypoglycemia Management] : hypoglycemia management [Carbohydrate Consistent Diet] : carbohydrate consistent diet [Diabetes Foot Care] : diabetes foot care [Long Term Vascular Complications] : long term vascular complications of diabetes [Importance of Diet and Exercise] : importance of diet and exercise to improve glycemic control, achieve weight loss and improve cardiovascular health [Action and use of Insulin] : action and use of short and long-acting insulin [Insulin Self-Administration] : insulin self-administration [Self Monitoring of Blood Glucose] : self monitoring of blood glucose [Injection Technique, Storage, Sharps Disposal] : injection technique, storage, and sharps disposal [Retinopathy Screening] : Patient was referred to ophthalmology for retinopathy screening

## 2020-01-13 PROBLEM — E51.9 THIAMINE DEFICIENCY: Status: ACTIVE | Noted: 2020-01-01

## 2020-01-14 NOTE — ASSESSMENT
[FreeTextEntry1] : Assessment:\par 78yo RH AAW, with above pmh and concerns for ongoing cognitive decline. STM and VS deficits are present, and mild. Pt partially oriented. Slow processing as well is diffuse.\par Mild L side facial and LLE motor deficit, NOS. Will have MRI repeat in a week or so before the sx. Of note, on MRI from June 2019, there are a few vascular lacunar lesions on the R frontal cortical-subcortical areas.\par Gait impairment is multifactorial, possible DM neuropathy, and deconditioning is a factor.\par Sleep pattern issues. \par \par Diagnostic Impression:\par -MCI-possible AD pathology\par -deconditioning with gait nyxkndtbg-whzqhsajcmkahy-aannuh hospital stay, possible neuropathy?\par -sleep pattern alteration.\par \par Plan:\par -EEG and try to reduce Keppra, she has not had seizures since 6y ago\par -EMG and MRI LS spine, r/o neuropathy, radiculopathy\par -continue PT\par -would try to also reduce Lyrica to 50mg BID\par -check B vits and TFT again (B12 was low)\par \par \par A thorough discussion was entertained with the patient/caregiver regarding the use of psychoactive medications, their possible benefits and AE profile, including the risk of cardiovascular complications.\par We discussed the benefits of being active, physically and mentally, and the need to to establish a routine in this respect.\par Driving abilities and firearms possession and use were discussed, in relation to progression of the cognitive decline, and the need to assess them periodically.\par Patient/caregiver understand and agree with the plan.\par

## 2020-01-14 NOTE — REASON FOR VISIT
[Follow-Up: _____] : a [unfilled] follow-up visit [Family Member] : family member [FreeTextEntry1] : Cognitive decline.

## 2020-01-14 NOTE — HISTORY OF PRESENT ILLNESS
[FreeTextEntry1] : HPI-Interval Hx [id number]:\par pt s/p meningioma resection 10/31.\par Since then, not great improvement, went to rehab but her motor skills actually worsened, she cannot walk now, and barely moves her legs. Unclear what the progression has been.\par She has also been more confused, mostly at night.\par Sleeps a lot.\par Appetite stable.\par \par HPI: 80yo RHAAW, with PMH, of DM, HTN, HLD, Hypothyroidism, PMHof pituitary adenoma s/p sx and RTx, L side left cavernous sinus meningioma, compressing L ON, with loss of vision and eye in L side, here for evaluation of cognitive decline. \par \par Language: Creole-speaking Patient and daughter speaks English fluently. \par \par PMH:\par Brain Tumor, per Dr. Watts 9/19/2019:\par Tumor has been present since 2010. She has a prior Transphenoidal pituitary tumor resection in 1992 done at [facility]. Surgery was followed by RT. Patient also developed seizures after an emergency surgery for SDH after a fall in 2013 (by Dr. Pérez) and remains on Keppra for occasional left upper extremity twitching. Her left eye was removed at 7/8/2018 at [facility] by Dr. Sage because pt became blind in left eye and the eye started protruding from the eye socket as the tumor grew. Patient has an eye prosthesis however she is unable to wear because it keeps popping out when place in eye socket. Per daughter patient also developed difficulty hearing from left ear and very diminished sense of smell since then. She recently had a tube placed in left ear and reports that her hearing has improved since then. The brain tumor has since then been monitored with neuro imaging by Dr. Alma Delia Cox, neuro oncologist at [facility]. \par Per daughter patient also has an adrenal tumor that is being managed by Dr. Shree Mendoza, [facility], [address] [place] [address], [facility], [place] [address].\par Surgical history includes cholecystectomy, hysterectomy 1982 , left oophorectomy 1982, tubal ligation 1972. \par Scheduled NSX for meningioma removal on 10.31.\par \par Since 2014, family has noted some forgetfulness, mostly recent events and forgetting names of a few people.\par This has been very slow, and only in the last 2 years her daughter has noted more STM issues, confusing the present with the past.\par She has had visual hallucinations, seeing people that are not there. Of note, her R eye vision was gone in 2010, and removal of the L eye happened in 2018.\par She also has Auditory hallucinations, and things at time the TV is talking to her.\par She tends to get agitated and frustrated about her condition and lots of other issues. \par She is asking for clothes that she thinks she is missing, or confuses other people's clothes as hers.\par Per daughter, there are fluctuations of her cognitive state.\par \par Sleep: chronic insomnia, at times she does not sleep during the night, naps a lot in the day.\par \par Appetite: elevated, wants to eat all the times.\par \par Motor symptoms: poor balance, for many years, tends to fall frequently, bw and fw. Uses a cane. Has chronic sciatica pain on R side. \par Poor activities, spends a lot of time in bed.\par \par B/B: chronic diarrhea, uses diapers.\par \par Psychiatric symptoms: as above. PMH of depression and one SA. \par \par ADL: limited, can wash herself but needs assistance for showering; can eat; can dress but needs physical help\par IADL: poor\par CDR: 1.5.\par \par Professional status: retired, former RN.\par \par PCP and other physicians:\par -PCP: Dr. Vinson, [address] [facility], [facility] [address], [phone].\par -Neuro: [facility]\par -NSX: Dedashti\par -Endo: Pearl.\par \par Workup done: MRI brain, labs: low B12, elevated gastrin and chromogranin.\par \par Started on Memantine 5mg BID by Joseph Mckeon, no benefits, pt seems to be more confused, and has more hallucinations since then.\par Tried Aricept prior, not working. \par

## 2020-01-14 NOTE — PHYSICAL EXAM
[General Appearance - Alert] : alert [General Appearance - In No Acute Distress] : in no acute distress [Impaired Insight] : insight and judgment were intact [Affect] : the affect was normal [Person] : oriented to person [Remote Intact] : remote memory intact [Concentration Intact] : normal concentrating ability [Naming Objects] : no difficulty naming common objects [Visual Intact] : visual attention was ~T not ~L decreased [Comprehension] : comprehension intact [Fluency] : fluency intact [Reading] : reading intact [Past History] : adequate knowledge of personal past history [Total Score ___ / 30] : the patient achieved a score of [unfilled] /30 [Vocabulary] : adequate range of vocabulary [Place ___ / 5] : place [unfilled] / 5 [Date / Time ___ / 5] : date / time [unfilled] / 5 [Registration ___ / 3] : registration [unfilled] / 3 [Serial Sevens ___/5] : serial sevens [unfilled] / 5 [Naming 2 Objects ___ / 2] : naming two objects [unfilled] / 2 [Repeating a Sentence ___ / 1] : repeating a sentence [unfilled] / 1 [Writing a Sentence ___ / 1] : write sentence [unfilled] / 1 [Written Command ___ / 1] : written command [unfilled] / 1 [3-stage Verbal Command ___ / 3] : three-stage verbal command [unfilled] / 3 [Recall ___ / 3] : recall [unfilled] / 3 [Copy a Design ___ / 1] : copy a design [unfilled] / 1 [Cranial Nerves Optic (II)] : visual acuity intact bilaterally,  visual fields full to confrontation, pupils equal round and reactive to light [Cranial Nerves Oculomotor (III)] : extraocular motion intact [Cranial Nerves Facial (VII)] : face symmetrical [Cranial Nerves Trigeminal (V)] : facial sensation intact symmetrically [Cranial Nerves Vestibulocochlear (VIII)] : hearing was intact bilaterally [Cranial Nerves Glossopharyngeal (IX)] : tongue and palate midline [Cranial Nerves Accessory (XI - Cranial And Spinal)] : head turning and shoulder shrug symmetric [Cranial Nerves Hypoglossal (XII)] : there was no tongue deviation with protrusion [No Muscle Atrophy] : normal bulk in all four extremities [Motor Handedness Right-Handed] : the patient is right hand dominant [Sensation Tactile Decrease] : light touch was intact [Sensation Pain / Temperature Decrease] : pain and temperature was intact [Non-ambulatory] : Non-ambulatory [Limited Balance] : the patient's balance was impaired [1+] : Brachioradialis right 1+ [0] : Ankle jerk left 0 [PERRL With Normal Accommodation] : pupils were equal in size, round, reactive to light, with normal accommodation [Sclera] : the sclera and conjunctiva were normal [Extraocular Movements] : extraocular movements were intact [Optic Disc Abnormality] : the optic disc were normal in size and color [Full Visual Field] : full visual field [Neck Appearance] : the appearance of the neck was normal [Outer Ear] : the ears and nose were normal in appearance [Oropharynx] : the oropharynx was normal [Jugular Venous Distention Increased] : there was no jugular-venous distention [Neck Cervical Mass (___cm)] : no neck mass was observed [Thyroid Diffuse Enlargement] : the thyroid was not enlarged [Thyroid Nodule] : there were no palpable thyroid nodules [Heart Rate And Rhythm] : heart rate was normal and rhythm regular [Heart Sounds] : normal S1 and S2 [Auscultation Breath Sounds / Voice Sounds] : lungs were clear to auscultation bilaterally [Heart Sounds Gallop] : no gallops [Heart Sounds Pericardial Friction Rub] : no pericardial rub [Murmurs] : no murmurs [Arterial Pulses Carotid] : carotid pulses were normal with no bruits [Edema] : there was no peripheral edema [Full Pulse] : the pedal pulses are present [Abdomen Soft] : soft [Bowel Sounds] : normal bowel sounds [Abdomen Tenderness] : non-tender [Abdomen Mass (___ Cm)] : no abdominal mass palpated [No CVA Tenderness] : no ~M costovertebral angle tenderness [No Spinal Tenderness] : no spinal tenderness [Abnormal Walk] : normal gait [Nail Clubbing] : no clubbing  or cyanosis of the fingernails [Musculoskeletal - Swelling] : no joint swelling seen [Skin Color & Pigmentation] : normal skin color and pigmentation [Motor Tone] : muscle strength and tone were normal [] : no rash [Skin Turgor] : normal skin turgor [Place] : disoriented to place [Short Term Intact] : short term memory impaired [Time] : disoriented to time [Repeating Phrases] : difficulty repeating a phrase [Registration Intact] : recent registration memory impaired [Span Intact] : the attention span was decreased [Writing A Sentence] : difficulty writing a sentence [Motor Strength Upper Extremities Bilaterally] : strength was normal in both upper extremities [Current Events] : inadequate knowledge of current events [Motor Strength Lower Extremities Bilaterally] : strength was normal in both lower extremities [Allodynia] : no ~T allodynia present [Romberg's Sign] : Romberg's sign was negtive [Dysesthesia] : no dysesthesia [Hyperesthesia] : no hyperesthesia [Tremor] : no tremor present [Dysdiadochokinesia Bilaterally] : not present [Past-pointing] : there was no past-pointing [Coordination - Dysmetria Impaired Finger-to-Nose Bilateral] : not present [Coordination - Dysmetria Impaired Heel-to-Shin Bilateral] : not present [Plantar Reflex Right Only] : normal on the right [Plantar Reflex Left Only] : normal on the left [FreeTextEntry4] : Mental Status Exam-poor, limited cooperation\par Presidents: 1/5\par Exam very limited by poor vision\par  [FreeTextEntry5] : Slight asymmetry of NLF, R lower than L, with mild dynamic deficit-stable. L eye sutured shut/enucleated. [FreeTextEntry7] : reduced vib and position sense in LE [FreeTextEntry6] : Can move her UE; can barely move her LE with trace movements L>R .\par Mild resting tremor in R>L hand, pill rolling at times; mild cogwheel at the wrists.  [FreeTextEntry8] : She cannot stand, can barely move her legs. 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 R eye wnl.

## 2020-02-18 PROBLEM — H65.92 LEFT OTITIS MEDIA WITH EFFUSION: Status: ACTIVE | Noted: 2019-01-01

## 2020-02-18 NOTE — PHYSICAL EXAM
[de-identified] : Tube and wax in L EAC.  Removed. [de-identified] : L OME persists [Midline] : trachea located in midline position [Normal] : no rashes [de-identified] : Significantly impaired.

## 2020-02-18 NOTE — HISTORY OF PRESENT ILLNESS
[de-identified] : 79 year old female follow up  Left otitis media with effusion and intranasal Left orbital mass/meningioma, possible myringotomy with tube placement, history of pituitary tumor removed 1992, anosmia since, Left eye sight gone 2010, Left eye removed July 2018 secondary to grown brain tumor/meningioma. Information obtained by patient's daughter. Daughter reports patient complained of right otalgia over the weekend. Denies otorrhea, ear infections since the last visit. Hearing loss has been stable. Daughter states patient had brain tumor removed in 10/2019 by Dr. Culp.

## 2020-02-18 NOTE — REASON FOR VISIT
[Subsequent Evaluation] : a subsequent evaluation for [Family Member] : family member [FreeTextEntry2] : follow up for left otitis media

## 2020-02-18 NOTE — ASSESSMENT
[FreeTextEntry1] : Pt with evidence of recurrent L effusion.  However, given the current level of cognitive impairment family is not certain they want to replace tube as it may not be possible to place in office.  They will contact me if they wish to proceed with tube placement.

## 2020-02-21 PROBLEM — D49.7 ADRENAL NEOPLASM: Status: ACTIVE | Noted: 2020-01-01

## 2020-02-21 NOTE — REVIEW OF SYSTEMS
[Feeling Poorly] : feeling poorly [Feeling Tired] : feeling tired [see HPI] : see HPI [Negative] : Neurological

## 2020-02-21 NOTE — PHYSICAL EXAM
[General Appearance - Well Nourished] : well nourished [General Appearance - Well Developed] : well developed [Abdomen Soft] : soft [Heart Rate And Rhythm] : Heart rate and rhythm were normal [Oriented To Time, Place, And Person] : oriented to person, place, and time [] : no respiratory distress [No Focal Deficits] : no focal deficits [No Palpable Adenopathy] : no palpable adenopathy [FreeTextEntry1] : non ambulatory , Wheelchair bound

## 2020-02-21 NOTE — ASSESSMENT
[FreeTextEntry1] : She is scheduled for a breast biopsy in April . After the craniotomy she had some decline . She is sleeping a lot and cannot ambulate . The MRI confirms a stable left adrenal mass

## 2020-02-21 NOTE — HISTORY OF PRESENT ILLNESS
[FreeTextEntry1] : Seen last June 2019 for a left adrenal lesion . Work up was negative . She had an MRI 3 weeks ago . Stable 2.7 cm left adrenal lesion . She had another craniotomy Oct 31 . She has been functionally declining . She is going to PT for ambulation but currently is wheelchair bound

## 2020-02-23 PROBLEM — R52 PAIN: Status: ACTIVE | Noted: 2019-01-01

## 2020-02-23 PROBLEM — R13.12 DYSPHAGIA, OROPHARYNGEAL: Status: ACTIVE | Noted: 2019-01-01

## 2020-02-23 PROBLEM — R51 CHRONIC FACIAL PAIN: Status: ACTIVE | Noted: 2017-10-23

## 2020-02-23 PROBLEM — E27.8 ADRENAL INCIDENTALOMA: Status: ACTIVE | Noted: 2018-12-14

## 2020-03-30 PROBLEM — R05 COUGH: Status: ACTIVE | Noted: 2020-01-01

## 2020-04-06 PROBLEM — R06.02 SHORTNESS OF BREATH: Status: ACTIVE | Noted: 2020-01-01

## 2020-04-07 PROBLEM — E87.6 HYPOKALEMIA: Status: ACTIVE | Noted: 2020-01-01

## 2020-04-20 PROBLEM — I95.9 HYPOTENSION: Status: ACTIVE | Noted: 2020-01-01

## 2020-04-20 PROBLEM — F05 ACUTE HYPOACTIVE DELIRIUM DUE TO ANOTHER MEDICAL CONDITION: Status: ACTIVE | Noted: 2020-01-01

## 2020-04-20 PROBLEM — R00.0 TACHYCARDIA: Status: ACTIVE | Noted: 2020-01-01

## 2020-04-20 PROBLEM — R56.9 SEIZURE: Status: ACTIVE | Noted: 2020-01-01

## 2020-04-24 PROBLEM — F03.91 DEMENTIA WITH BEHAVIORAL DISTURBANCE: Status: ACTIVE | Noted: 2017-10-23

## 2020-04-24 PROBLEM — R53.81 DEBILITY: Status: ACTIVE | Noted: 2019-01-01

## 2020-04-24 PROBLEM — R68.89 SUSPECTED COVID-19 VIRUS INFECTION: Status: ACTIVE | Noted: 2020-01-01

## 2020-04-24 PROBLEM — R41.0 DELIRIUM: Status: ACTIVE | Noted: 2020-04-24

## 2020-04-24 PROBLEM — Z71.89 ADVANCE CARE PLANNING: Status: ACTIVE | Noted: 2017-11-10

## 2020-04-24 PROBLEM — R06.82 TACHYPNEA: Status: ACTIVE | Noted: 2020-01-01

## 2020-05-06 ENCOUNTER — APPOINTMENT (OUTPATIENT)
Dept: SPEECH THERAPY | Facility: CLINIC | Age: 80
End: 2020-05-06

## 2020-06-15 ENCOUNTER — APPOINTMENT (OUTPATIENT)
Dept: ULTRASOUND IMAGING | Facility: CLINIC | Age: 80
End: 2020-06-15

## 2020-06-29 ENCOUNTER — APPOINTMENT (OUTPATIENT)
Dept: NEUROLOGY | Facility: CLINIC | Age: 80
End: 2020-06-29

## 2020-07-01 ENCOUNTER — APPOINTMENT (OUTPATIENT)
Dept: ENDOCRINOLOGY | Facility: CLINIC | Age: 80
End: 2020-07-01

## 2020-12-23 PROBLEM — Z87.09 HISTORY OF SORE THROAT: Status: RESOLVED | Noted: 2020-01-01 | Resolved: 2020-12-23

## 2021-02-23 ENCOUNTER — APPOINTMENT (OUTPATIENT)
Dept: UROLOGY | Facility: CLINIC | Age: 81
End: 2021-02-23

## 2021-03-09 NOTE — ED PROVIDER NOTE - PSH
H/O: Hysterectomy  abdominal with right oophorectomy in 1982  Pituitary Tumor  excision in 1992  S/P Cholecystectomy  open in 1979  S/P Tubal Ligation  in 1978 1

## 2021-10-06 PROBLEM — I10 ESSENTIAL HYPERTENSION: Status: ACTIVE | Noted: 2017-11-11

## 2021-11-12 NOTE — PATIENT PROFILE ADULT - NSPROMUTANXFEARFT_GEN_A_NUR
Wound care completed to left heel. Cleansed w/ wound cleanser, patted dry, skin prep applied to outer area of wound, sponge applied w/ suction 125mmHg. Wound has macerated edges; white in color. Center is pale pink. No drainage. Pt has a soft wound to the left plantar area that is dark in color; pt states this was a larger open wound and this is \"healed\". Pt tolerated well. questions answered, personal needs met

## 2022-04-14 NOTE — REVIEW OF SYSTEMS
[Joint Pain] : joint pain [Negative] : Respiratory [FreeTextEntry2] : fatigue [de-identified] : headaches [FreeTextEntry9] : w Nsaids Pregnancy And Lactation Text: These medications are considered safe up to 30 weeks gestation. It is excreted in breast milk.

## 2023-11-05 NOTE — PATIENT PROFILE ADULT. - BLOOD AVOIDANCE/RESTRICTIONS, PROFILE
PAST SURGICAL HISTORY:  History of left hip replacement     S/P ACL repair     Status post open reduction and internal fixation (ORIF) of fracture     
none
